# Patient Record
Sex: FEMALE | Race: BLACK OR AFRICAN AMERICAN | Employment: OTHER | URBAN - METROPOLITAN AREA
[De-identification: names, ages, dates, MRNs, and addresses within clinical notes are randomized per-mention and may not be internally consistent; named-entity substitution may affect disease eponyms.]

---

## 2018-02-16 PROBLEM — N17.9 AKI (ACUTE KIDNEY INJURY) (HCC): Status: ACTIVE | Noted: 2018-02-16

## 2018-02-28 PROBLEM — E66.01 MORBID OBESITY WITH BMI OF 50.0-59.9, ADULT (HCC): Status: ACTIVE | Noted: 2018-02-28

## 2018-02-28 PROBLEM — N18.2 STAGE 2 CHRONIC KIDNEY DISEASE: Status: ACTIVE | Noted: 2018-02-28

## 2018-02-28 PROBLEM — N18.2 TYPE 2 DIABETES MELLITUS WITH STAGE 2 CHRONIC KIDNEY DISEASE, WITHOUT LONG-TERM CURRENT USE OF INSULIN (HCC): Status: ACTIVE | Noted: 2018-02-28

## 2018-02-28 PROBLEM — E11.22 TYPE 2 DIABETES MELLITUS WITH STAGE 2 CHRONIC KIDNEY DISEASE, WITHOUT LONG-TERM CURRENT USE OF INSULIN (HCC): Status: ACTIVE | Noted: 2018-02-28

## 2018-03-13 ENCOUNTER — HOSPITAL ENCOUNTER (OUTPATIENT)
Dept: NON INVASIVE DIAGNOSTICS | Age: 58
Discharge: HOME | End: 2018-03-13
Payer: MEDICAID

## 2018-03-13 ENCOUNTER — HOSPITAL ENCOUNTER (OUTPATIENT)
Dept: NUCLEAR MEDICINE | Age: 58
Discharge: HOME | End: 2018-03-15
Payer: MEDICAID

## 2018-03-13 VITALS — WEIGHT: 293 LBS | HEIGHT: 66 IN | BODY MASS INDEX: 47.09 KG/M2

## 2018-03-13 LAB
LV EF: 65 %
LVEF MODALITY: NORMAL

## 2018-03-13 PROCEDURE — 2580000003 HC RX 258

## 2018-03-13 PROCEDURE — 3430000000 HC RX DIAGNOSTIC RADIOPHARMACEUTICAL: Performed by: RADIOLOGY

## 2018-03-13 PROCEDURE — 96523 IRRIG DRUG DELIVERY DEVICE: CPT

## 2018-03-13 PROCEDURE — 78452 HT MUSCLE IMAGE SPECT MULT: CPT

## 2018-03-13 PROCEDURE — A9500 TC99M SESTAMIBI: HCPCS | Performed by: RADIOLOGY

## 2018-03-13 PROCEDURE — 6360000002 HC RX W HCPCS: Performed by: INTERNAL MEDICINE

## 2018-03-13 PROCEDURE — 93017 CV STRESS TEST TRACING ONLY: CPT

## 2018-03-13 RX ADMIN — Medication 10 MILLICURIE: at 13:35

## 2018-03-13 RX ADMIN — Medication 35 MILLICURIE: at 14:53

## 2018-03-13 RX ADMIN — REGADENOSON 0.4 MG: 0.08 INJECTION, SOLUTION INTRAVENOUS at 14:50

## 2018-03-23 ENCOUNTER — HOSPITAL ENCOUNTER (OUTPATIENT)
Age: 58
Discharge: HOME | End: 2018-03-23
Payer: MEDICAID

## 2018-03-23 LAB
ANION GAP SERPL CALCULATED.3IONS-SCNC: 17 MMOL/L (ref 7–16)
BUN BLDV-MCNC: 36 MG/DL (ref 6–20)
CALCIUM SERPL-MCNC: 10.4 MG/DL (ref 8.6–10.2)
CHLORIDE BLD-SCNC: 93 MMOL/L (ref 98–107)
CO2: 25 MMOL/L (ref 22–29)
CREAT SERPL-MCNC: 1.4 MG/DL (ref 0.5–1)
GFR AFRICAN AMERICAN: 47
GFR NON-AFRICAN AMERICAN: 47 ML/MIN/1.73
GLUCOSE BLD-MCNC: 156 MG/DL (ref 74–109)
MAGNESIUM: 1.6 MG/DL (ref 1.6–2.6)
POTASSIUM SERPL-SCNC: 4 MMOL/L (ref 3.5–5)
SODIUM BLD-SCNC: 135 MMOL/L (ref 132–146)
URIC ACID, SERUM: 11.2 MG/DL (ref 2.4–5.7)

## 2018-03-23 PROCEDURE — 36415 COLL VENOUS BLD VENIPUNCTURE: CPT

## 2018-03-23 PROCEDURE — 83735 ASSAY OF MAGNESIUM: CPT

## 2018-03-23 PROCEDURE — 80048 BASIC METABOLIC PNL TOTAL CA: CPT

## 2018-03-23 PROCEDURE — 84550 ASSAY OF BLOOD/URIC ACID: CPT

## 2018-05-29 ENCOUNTER — HOSPITAL ENCOUNTER (OUTPATIENT)
Age: 58
Discharge: HOME | End: 2018-05-29
Payer: MEDICAID

## 2018-05-29 LAB
ALBUMIN SERPL-MCNC: 4.4 G/DL (ref 3.5–5.2)
ALP BLD-CCNC: 50 U/L (ref 35–104)
ALT SERPL-CCNC: 26 U/L (ref 0–32)
ANION GAP SERPL CALCULATED.3IONS-SCNC: 21 MMOL/L (ref 7–16)
AST SERPL-CCNC: 17 U/L (ref 0–31)
BILIRUB SERPL-MCNC: 0.4 MG/DL (ref 0–1.2)
BUN BLDV-MCNC: 40 MG/DL (ref 6–20)
CALCIUM SERPL-MCNC: 10.7 MG/DL (ref 8.6–10.2)
CHLORIDE BLD-SCNC: 93 MMOL/L (ref 98–107)
CO2: 20 MMOL/L (ref 22–29)
CREAT SERPL-MCNC: 1.3 MG/DL (ref 0.5–1)
CREATININE URINE: 161 MG/DL (ref 29–226)
GFR AFRICAN AMERICAN: 51
GFR NON-AFRICAN AMERICAN: 51 ML/MIN/1.73
GLUCOSE BLD-MCNC: 183 MG/DL (ref 74–109)
MAGNESIUM: 1.5 MG/DL (ref 1.6–2.6)
PHOSPHORUS: 2.9 MG/DL (ref 2.5–4.5)
POTASSIUM SERPL-SCNC: 3.8 MMOL/L (ref 3.5–5)
PROTEIN PROTEIN: 7 MG/DL (ref 0–12)
PROTEIN/CREAT RATIO: 0
PROTEIN/CREAT RATIO: 0 (ref 0–0.2)
SODIUM BLD-SCNC: 134 MMOL/L (ref 132–146)
TOTAL PROTEIN: 7.7 G/DL (ref 6.4–8.3)
URIC ACID, SERUM: 7.9 MG/DL (ref 2.4–5.7)

## 2018-05-29 PROCEDURE — 82570 ASSAY OF URINE CREATININE: CPT

## 2018-05-29 PROCEDURE — 84550 ASSAY OF BLOOD/URIC ACID: CPT

## 2018-05-29 PROCEDURE — 36415 COLL VENOUS BLD VENIPUNCTURE: CPT

## 2018-05-29 PROCEDURE — 85025 COMPLETE CBC W/AUTO DIFF WBC: CPT

## 2018-05-29 PROCEDURE — 84156 ASSAY OF PROTEIN URINE: CPT

## 2018-05-29 PROCEDURE — 83735 ASSAY OF MAGNESIUM: CPT

## 2018-05-29 PROCEDURE — 84100 ASSAY OF PHOSPHORUS: CPT

## 2018-05-29 PROCEDURE — 80053 COMPREHEN METABOLIC PANEL: CPT

## 2018-05-30 LAB
BASOPHILS ABSOLUTE: ABNORMAL E9/L (ref 0–0.2)
BASOPHILS RELATIVE PERCENT: ABNORMAL % (ref 0–2)
EOSINOPHILS ABSOLUTE: ABNORMAL E9/L (ref 0.05–0.5)
EOSINOPHILS RELATIVE PERCENT: ABNORMAL % (ref 0–6)
HCT VFR BLD CALC: ABNORMAL % (ref 34–48)
HEMOGLOBIN: ABNORMAL G/DL (ref 11.5–15.5)
IMMATURE GRANULOCYTES #: ABNORMAL E9/L
IMMATURE GRANULOCYTES %: ABNORMAL % (ref 0–5)
LYMPHOCYTES ABSOLUTE: ABNORMAL E9/L (ref 1.5–4)
LYMPHOCYTES RELATIVE PERCENT: ABNORMAL % (ref 20–42)
MCH RBC QN AUTO: ABNORMAL PG (ref 26–35)
MCHC RBC AUTO-ENTMCNC: ABNORMAL % (ref 32–34.5)
MCV RBC AUTO: ABNORMAL FL (ref 80–99.9)
MONOCYTES ABSOLUTE: ABNORMAL E9/L (ref 0.1–0.95)
MONOCYTES RELATIVE PERCENT: ABNORMAL % (ref 2–12)
NEUTROPHILS ABSOLUTE: ABNORMAL E9/L (ref 1.8–7.3)
NEUTROPHILS RELATIVE PERCENT: ABNORMAL % (ref 43–80)
PDW BLD-RTO: ABNORMAL FL (ref 11.5–15)
PLATELET # BLD: ABNORMAL E9/L (ref 130–450)
PMV BLD AUTO: ABNORMAL FL (ref 7–12)
RBC # BLD: ABNORMAL E12/L (ref 3.5–5.5)
WBC # BLD: ABNORMAL E9/L (ref 4.5–11.5)

## 2018-06-06 ENCOUNTER — HOSPITAL ENCOUNTER (OUTPATIENT)
Age: 58
Discharge: HOME | End: 2018-06-06
Payer: MEDICAID

## 2018-06-06 LAB
BASOPHILS ABSOLUTE: 0.07 E9/L (ref 0–0.2)
BASOPHILS RELATIVE PERCENT: 0.9 % (ref 0–2)
EOSINOPHILS ABSOLUTE: 0.19 E9/L (ref 0.05–0.5)
EOSINOPHILS RELATIVE PERCENT: 2.3 % (ref 0–6)
HCT VFR BLD CALC: 34.9 % (ref 34–48)
HEMOGLOBIN: 11.6 G/DL (ref 11.5–15.5)
IMMATURE GRANULOCYTES #: 0.05 E9/L
IMMATURE GRANULOCYTES %: 0.6 % (ref 0–5)
LYMPHOCYTES ABSOLUTE: 2.26 E9/L (ref 1.5–4)
LYMPHOCYTES RELATIVE PERCENT: 27.7 % (ref 20–42)
MCH RBC QN AUTO: 29.6 PG (ref 26–35)
MCHC RBC AUTO-ENTMCNC: 33.2 % (ref 32–34.5)
MCV RBC AUTO: 89 FL (ref 80–99.9)
MONOCYTES ABSOLUTE: 0.45 E9/L (ref 0.1–0.95)
MONOCYTES RELATIVE PERCENT: 5.5 % (ref 2–12)
NEUTROPHILS ABSOLUTE: 5.13 E9/L (ref 1.8–7.3)
NEUTROPHILS RELATIVE PERCENT: 63 % (ref 43–80)
PDW BLD-RTO: 12.3 FL (ref 11.5–15)
PLATELET # BLD: 366 E9/L (ref 130–450)
PMV BLD AUTO: 9.3 FL (ref 7–12)
RBC # BLD: 3.92 E12/L (ref 3.5–5.5)
WBC # BLD: 8.2 E9/L (ref 4.5–11.5)

## 2018-06-06 PROCEDURE — 36415 COLL VENOUS BLD VENIPUNCTURE: CPT

## 2018-06-06 PROCEDURE — 85025 COMPLETE CBC W/AUTO DIFF WBC: CPT

## 2018-10-24 ENCOUNTER — HOSPITAL ENCOUNTER (OUTPATIENT)
Age: 58
Discharge: HOME | End: 2018-10-24
Payer: MEDICAID

## 2018-10-24 DIAGNOSIS — N18.2 STAGE 2 CHRONIC KIDNEY DISEASE: ICD-10-CM

## 2018-10-24 DIAGNOSIS — N18.2 TYPE 2 DIABETES MELLITUS WITH STAGE 2 CHRONIC KIDNEY DISEASE, WITHOUT LONG-TERM CURRENT USE OF INSULIN (HCC): ICD-10-CM

## 2018-10-24 DIAGNOSIS — N17.9 AKI (ACUTE KIDNEY INJURY) (HCC): ICD-10-CM

## 2018-10-24 DIAGNOSIS — E11.22 TYPE 2 DIABETES MELLITUS WITH STAGE 2 CHRONIC KIDNEY DISEASE, WITHOUT LONG-TERM CURRENT USE OF INSULIN (HCC): ICD-10-CM

## 2018-10-24 LAB
ANION GAP SERPL CALCULATED.3IONS-SCNC: 17 MMOL/L (ref 7–16)
BUN BLDV-MCNC: 21 MG/DL (ref 6–20)
CALCIUM SERPL-MCNC: 9.8 MG/DL (ref 8.6–10.2)
CHLORIDE BLD-SCNC: 102 MMOL/L (ref 98–107)
CHOLESTEROL, TOTAL: 177 MG/DL (ref 0–199)
CO2: 18 MMOL/L (ref 22–29)
CREAT SERPL-MCNC: 1.2 MG/DL (ref 0.5–1)
GFR AFRICAN AMERICAN: 56
GFR NON-AFRICAN AMERICAN: 56 ML/MIN/1.73
GLUCOSE BLD-MCNC: 161 MG/DL (ref 74–109)
HBA1C MFR BLD: 9.7 % (ref 4–5.6)
HCT VFR BLD CALC: 38.3 % (ref 34–48)
HDLC SERPL-MCNC: 47 MG/DL
HEMOGLOBIN: 12.3 G/DL (ref 11.5–15.5)
LDL CHOLESTEROL CALCULATED: 101 MG/DL (ref 0–99)
MCH RBC QN AUTO: 28.7 PG (ref 26–35)
MCHC RBC AUTO-ENTMCNC: 32.1 % (ref 32–34.5)
MCV RBC AUTO: 89.3 FL (ref 80–99.9)
PDW BLD-RTO: 12.3 FL (ref 11.5–15)
PLATELET # BLD: 369 E9/L (ref 130–450)
PMV BLD AUTO: 9.8 FL (ref 7–12)
POTASSIUM SERPL-SCNC: 4.7 MMOL/L (ref 3.5–5)
RBC # BLD: 4.29 E12/L (ref 3.5–5.5)
SODIUM BLD-SCNC: 137 MMOL/L (ref 132–146)
TRIGL SERPL-MCNC: 147 MG/DL (ref 0–149)
VLDLC SERPL CALC-MCNC: 29 MG/DL
WBC # BLD: 7.5 E9/L (ref 4.5–11.5)

## 2018-10-24 PROCEDURE — 80048 BASIC METABOLIC PNL TOTAL CA: CPT

## 2018-10-24 PROCEDURE — 80061 LIPID PANEL: CPT

## 2018-10-24 PROCEDURE — 85027 COMPLETE CBC AUTOMATED: CPT

## 2018-10-24 PROCEDURE — 36415 COLL VENOUS BLD VENIPUNCTURE: CPT

## 2018-10-24 PROCEDURE — 83036 HEMOGLOBIN GLYCOSYLATED A1C: CPT

## 2018-11-06 ENCOUNTER — APPOINTMENT (OUTPATIENT)
Dept: CT IMAGING | Age: 58
End: 2018-11-06
Payer: MEDICAID

## 2018-11-06 ENCOUNTER — HOSPITAL ENCOUNTER (EMERGENCY)
Age: 58
Discharge: HOME | End: 2018-11-06
Attending: EMERGENCY MEDICINE
Payer: MEDICAID

## 2018-11-06 VITALS
HEART RATE: 76 BPM | WEIGHT: 293 LBS | OXYGEN SATURATION: 98 % | BODY MASS INDEX: 48.82 KG/M2 | DIASTOLIC BLOOD PRESSURE: 78 MMHG | RESPIRATION RATE: 16 BRPM | TEMPERATURE: 97.8 F | HEIGHT: 65 IN | SYSTOLIC BLOOD PRESSURE: 149 MMHG

## 2018-11-06 DIAGNOSIS — K29.00 ACUTE SUPERFICIAL GASTRITIS WITHOUT HEMORRHAGE: Primary | ICD-10-CM

## 2018-11-06 LAB
ALBUMIN SERPL-MCNC: 4.7 G/DL (ref 3.5–5.2)
ALP BLD-CCNC: 60 U/L (ref 35–104)
ALT SERPL-CCNC: 29 U/L (ref 0–32)
ANION GAP SERPL CALCULATED.3IONS-SCNC: 12 MMOL/L (ref 7–16)
AST SERPL-CCNC: 26 U/L (ref 0–31)
BASOPHILS ABSOLUTE: 0.07 E9/L (ref 0–0.2)
BASOPHILS RELATIVE PERCENT: 0.7 % (ref 0–2)
BILIRUB SERPL-MCNC: 0.5 MG/DL (ref 0–1.2)
BUN BLDV-MCNC: 19 MG/DL (ref 6–20)
CALCIUM SERPL-MCNC: 10.7 MG/DL (ref 8.6–10.2)
CHLORIDE BLD-SCNC: 99 MMOL/L (ref 98–107)
CO2: 25 MMOL/L (ref 22–29)
CREAT SERPL-MCNC: 1 MG/DL (ref 0.5–1)
EKG ATRIAL RATE: 63 BPM
EKG P AXIS: 29 DEGREES
EKG P-R INTERVAL: 164 MS
EKG Q-T INTERVAL: 388 MS
EKG QRS DURATION: 94 MS
EKG QTC CALCULATION (BAZETT): 397 MS
EKG R AXIS: -22 DEGREES
EKG T AXIS: 14 DEGREES
EKG VENTRICULAR RATE: 63 BPM
EOSINOPHILS ABSOLUTE: 0.14 E9/L (ref 0.05–0.5)
EOSINOPHILS RELATIVE PERCENT: 1.5 % (ref 0–6)
GFR AFRICAN AMERICAN: >60
GFR NON-AFRICAN AMERICAN: >60 ML/MIN/1.73
GLUCOSE BLD-MCNC: 161 MG/DL (ref 74–99)
HCT VFR BLD CALC: 42.6 % (ref 34–48)
HEMOGLOBIN: 13.9 G/DL (ref 11.5–15.5)
IMMATURE GRANULOCYTES #: 0.06 E9/L
IMMATURE GRANULOCYTES %: 0.6 % (ref 0–5)
LACTIC ACID: 1.7 MMOL/L (ref 0.5–2.2)
LIPASE: 23 U/L (ref 13–60)
LYMPHOCYTES ABSOLUTE: 2.48 E9/L (ref 1.5–4)
LYMPHOCYTES RELATIVE PERCENT: 26.6 % (ref 20–42)
MCH RBC QN AUTO: 29.2 PG (ref 26–35)
MCHC RBC AUTO-ENTMCNC: 32.6 % (ref 32–34.5)
MCV RBC AUTO: 89.5 FL (ref 80–99.9)
MONOCYTES ABSOLUTE: 0.71 E9/L (ref 0.1–0.95)
MONOCYTES RELATIVE PERCENT: 7.6 % (ref 2–12)
NEUTROPHILS ABSOLUTE: 5.88 E9/L (ref 1.8–7.3)
NEUTROPHILS RELATIVE PERCENT: 63 % (ref 43–80)
PDW BLD-RTO: 12.5 FL (ref 11.5–15)
PLATELET # BLD: 375 E9/L (ref 130–450)
PMV BLD AUTO: 9.8 FL (ref 7–12)
POTASSIUM SERPL-SCNC: 5.4 MMOL/L (ref 3.5–5)
PRO-BNP: 144 PG/ML (ref 0–125)
RBC # BLD: 4.76 E12/L (ref 3.5–5.5)
SODIUM BLD-SCNC: 136 MMOL/L (ref 132–146)
TOTAL PROTEIN: 8.2 G/DL (ref 6.4–8.3)
TROPONIN: <0.01 NG/ML (ref 0–0.03)
WBC # BLD: 9.3 E9/L (ref 4.5–11.5)

## 2018-11-06 PROCEDURE — 83880 ASSAY OF NATRIURETIC PEPTIDE: CPT

## 2018-11-06 PROCEDURE — 84484 ASSAY OF TROPONIN QUANT: CPT

## 2018-11-06 PROCEDURE — 2500000003 HC RX 250 WO HCPCS: Performed by: EMERGENCY MEDICINE

## 2018-11-06 PROCEDURE — 96375 TX/PRO/DX INJ NEW DRUG ADDON: CPT

## 2018-11-06 PROCEDURE — 83690 ASSAY OF LIPASE: CPT

## 2018-11-06 PROCEDURE — 2580000003 HC RX 258: Performed by: EMERGENCY MEDICINE

## 2018-11-06 PROCEDURE — 99284 EMERGENCY DEPT VISIT MOD MDM: CPT

## 2018-11-06 PROCEDURE — 83605 ASSAY OF LACTIC ACID: CPT

## 2018-11-06 PROCEDURE — 96374 THER/PROPH/DIAG INJ IV PUSH: CPT

## 2018-11-06 PROCEDURE — 74176 CT ABD & PELVIS W/O CONTRAST: CPT

## 2018-11-06 PROCEDURE — 6370000000 HC RX 637 (ALT 250 FOR IP): Performed by: EMERGENCY MEDICINE

## 2018-11-06 PROCEDURE — 6360000002 HC RX W HCPCS: Performed by: EMERGENCY MEDICINE

## 2018-11-06 PROCEDURE — S0028 INJECTION, FAMOTIDINE, 20 MG: HCPCS | Performed by: EMERGENCY MEDICINE

## 2018-11-06 PROCEDURE — 85025 COMPLETE CBC W/AUTO DIFF WBC: CPT

## 2018-11-06 PROCEDURE — 80053 COMPREHEN METABOLIC PANEL: CPT

## 2018-11-06 PROCEDURE — 36415 COLL VENOUS BLD VENIPUNCTURE: CPT

## 2018-11-06 RX ORDER — ACETAMINOPHEN 500 MG
1000 TABLET ORAL ONCE
Status: COMPLETED | OUTPATIENT
Start: 2018-11-06 | End: 2018-11-06

## 2018-11-06 RX ORDER — ONDANSETRON 2 MG/ML
4 INJECTION INTRAMUSCULAR; INTRAVENOUS ONCE
Status: COMPLETED | OUTPATIENT
Start: 2018-11-06 | End: 2018-11-06

## 2018-11-06 RX ORDER — 0.9 % SODIUM CHLORIDE 0.9 %
1000 INTRAVENOUS SOLUTION INTRAVENOUS ONCE
Status: COMPLETED | OUTPATIENT
Start: 2018-11-06 | End: 2018-11-06

## 2018-11-06 RX ORDER — MORPHINE SULFATE 2 MG/ML
8 INJECTION, SOLUTION INTRAMUSCULAR; INTRAVENOUS ONCE
Status: COMPLETED | OUTPATIENT
Start: 2018-11-06 | End: 2018-11-06

## 2018-11-06 RX ORDER — FAMOTIDINE 20 MG/1
20 TABLET, FILM COATED ORAL 2 TIMES DAILY
Qty: 14 TABLET | Refills: 0 | Status: SHIPPED | OUTPATIENT
Start: 2018-11-06 | End: 2019-09-18

## 2018-11-06 RX ORDER — SODIUM CHLORIDE 0.9 % (FLUSH) 0.9 %
10 SYRINGE (ML) INJECTION PRN
Status: DISCONTINUED | OUTPATIENT
Start: 2018-11-06 | End: 2018-11-07 | Stop reason: HOSPADM

## 2018-11-06 RX ORDER — SUCRALFATE 1 G/1
1 TABLET ORAL 4 TIMES DAILY
Qty: 120 TABLET | Refills: 3 | Status: SHIPPED | OUTPATIENT
Start: 2018-11-06 | End: 2019-09-18

## 2018-11-06 RX ADMIN — ACETAMINOPHEN 1000 MG: 500 TABLET ORAL at 20:32

## 2018-11-06 RX ADMIN — MORPHINE SULFATE 8 MG: 2 INJECTION, SOLUTION INTRAMUSCULAR; INTRAVENOUS at 20:32

## 2018-11-06 RX ADMIN — LIDOCAINE HYDROCHLORIDE: 20 SOLUTION ORAL; TOPICAL at 21:30

## 2018-11-06 RX ADMIN — FAMOTIDINE 20 MG: 10 INJECTION, SOLUTION INTRAVENOUS at 21:30

## 2018-11-06 RX ADMIN — ONDANSETRON 4 MG: 2 INJECTION INTRAMUSCULAR; INTRAVENOUS at 20:32

## 2018-11-06 RX ADMIN — SODIUM CHLORIDE 1000 ML: 9 INJECTION, SOLUTION INTRAVENOUS at 20:32

## 2018-11-06 RX ADMIN — Medication 10 ML: at 21:34

## 2018-11-06 ASSESSMENT — PAIN SCALES - GENERAL
PAINLEVEL_OUTOF10: 10
PAINLEVEL_OUTOF10: 2
PAINLEVEL_OUTOF10: 10

## 2018-11-06 ASSESSMENT — ENCOUNTER SYMPTOMS
DIARRHEA: 0
BLOOD IN STOOL: 0
BACK PAIN: 0
HEMATOCHEZIA: 0
NAUSEA: 1
COUGH: 0
VOMITING: 0
CONSTIPATION: 0
HEMATEMESIS: 0
SHORTNESS OF BREATH: 0
ABDOMINAL PAIN: 1

## 2018-11-06 ASSESSMENT — PAIN DESCRIPTION - LOCATION: LOCATION: ABDOMEN

## 2018-11-06 ASSESSMENT — PAIN DESCRIPTION - PAIN TYPE: TYPE: ACUTE PAIN

## 2018-11-07 NOTE — ED PROVIDER NOTES
% 1.5 0.0 - 6.0 %    Basophils % 0.7 0.0 - 2.0 %    Neutrophils # 5.88 1.80 - 7.30 E9/L    Immature Granulocytes # 0.06 E9/L    Lymphocytes # 2.48 1.50 - 4.00 E9/L    Monocytes # 0.71 0.10 - 0.95 E9/L    Eosinophils # 0.14 0.05 - 0.50 E9/L    Basophils # 0.07 0.00 - 0.20 E9/L   Comprehensive Metabolic Panel   Result Value Ref Range    Sodium 136 132 - 146 mmol/L    Potassium 5.4 (H) 3.5 - 5.0 mmol/L    Chloride 99 98 - 107 mmol/L    CO2 25 22 - 29 mmol/L    Anion Gap 12 7 - 16 mmol/L    Glucose 161 (H) 74 - 99 mg/dL    BUN 19 6 - 20 mg/dL    CREATININE 1.0 0.5 - 1.0 mg/dL    GFR Non-African American >60 >=60 mL/min/1.73    GFR African American >60     Calcium 10.7 (H) 8.6 - 10.2 mg/dL    Total Protein 8.2 6.4 - 8.3 g/dL    Alb 4.7 3.5 - 5.2 g/dL    Total Bilirubin 0.5 0.0 - 1.2 mg/dL    Alkaline Phosphatase 60 35 - 104 U/L    ALT 29 0 - 32 U/L    AST 26 0 - 31 U/L   Lipase   Result Value Ref Range    Lipase 23 13 - 60 U/L   Lactic Acid, Plasma   Result Value Ref Range    Lactic Acid 1.7 0.5 - 2.2 mmol/L   Brain Natriuretic Peptide   Result Value Ref Range    Pro- (H) 0 - 125 pg/mL   Troponin   Result Value Ref Range    Troponin <0.01 0.00 - 0.03 ng/mL   EKG 12 Lead   Result Value Ref Range    Ventricular Rate 63 BPM    Atrial Rate 63 BPM    P-R Interval 164 ms    QRS Duration 94 ms    Q-T Interval 388 ms    QTc Calculation (Bazett) 397 ms    P Axis 29 degrees    R Axis -22 degrees    T Axis 14 degrees       Radiology:  CT ABDOMEN PELVIS WO CONTRAST Additional Contrast? None   Final Result   1. Status post hysterectomy, ovaries not identified. 2. Mild hepatomegaly with diffuse fat infiltration. 3. Partially distended gallbladder but there are food contents in the   stomach. 4. No dilatation of the biliary tree or BRCA2 pancreatic ductal   system. 5. No obstructive uropathy. 6. Appendix seen and appears unremarkable.       7. No acute inflammatory changes omental mesenteric fat planes,

## 2018-11-14 ENCOUNTER — HOSPITAL ENCOUNTER (OUTPATIENT)
Age: 58
Discharge: HOME | End: 2018-11-14
Payer: MEDICAID

## 2018-11-14 LAB
ALBUMIN SERPL-MCNC: 4.5 G/DL (ref 3.5–5.2)
ALP BLD-CCNC: 61 U/L (ref 35–104)
ALT SERPL-CCNC: 25 U/L (ref 0–32)
ANION GAP SERPL CALCULATED.3IONS-SCNC: 12 MMOL/L (ref 7–16)
AST SERPL-CCNC: 17 U/L (ref 0–31)
BASOPHILS ABSOLUTE: 0.06 E9/L (ref 0–0.2)
BASOPHILS RELATIVE PERCENT: 0.6 % (ref 0–2)
BILIRUB SERPL-MCNC: 0.5 MG/DL (ref 0–1.2)
BUN BLDV-MCNC: 22 MG/DL (ref 6–20)
CALCIUM SERPL-MCNC: 10.8 MG/DL (ref 8.6–10.2)
CHLORIDE BLD-SCNC: 100 MMOL/L (ref 98–107)
CO2: 24 MMOL/L (ref 22–29)
CREAT SERPL-MCNC: 1.1 MG/DL (ref 0.5–1)
CREATININE URINE: 89 MG/DL (ref 29–226)
EOSINOPHILS ABSOLUTE: 0.13 E9/L (ref 0.05–0.5)
EOSINOPHILS RELATIVE PERCENT: 1.3 % (ref 0–6)
GFR AFRICAN AMERICAN: >60
GFR NON-AFRICAN AMERICAN: >60 ML/MIN/1.73
GLUCOSE BLD-MCNC: 162 MG/DL (ref 74–99)
HCT VFR BLD CALC: 39.2 % (ref 34–48)
HEMOGLOBIN: 12.6 G/DL (ref 11.5–15.5)
IMMATURE GRANULOCYTES #: 0.06 E9/L
IMMATURE GRANULOCYTES %: 0.6 % (ref 0–5)
LYMPHOCYTES ABSOLUTE: 2.41 E9/L (ref 1.5–4)
LYMPHOCYTES RELATIVE PERCENT: 23.7 % (ref 20–42)
MAGNESIUM: 1.7 MG/DL (ref 1.6–2.6)
MCH RBC QN AUTO: 28.5 PG (ref 26–35)
MCHC RBC AUTO-ENTMCNC: 32.1 % (ref 32–34.5)
MCV RBC AUTO: 88.7 FL (ref 80–99.9)
MONOCYTES ABSOLUTE: 0.69 E9/L (ref 0.1–0.95)
MONOCYTES RELATIVE PERCENT: 6.8 % (ref 2–12)
NEUTROPHILS ABSOLUTE: 6.8 E9/L (ref 1.8–7.3)
NEUTROPHILS RELATIVE PERCENT: 67 % (ref 43–80)
PDW BLD-RTO: 12.3 FL (ref 11.5–15)
PHOSPHORUS: 2.8 MG/DL (ref 2.5–4.5)
PLATELET # BLD: 410 E9/L (ref 130–450)
PMV BLD AUTO: 9.5 FL (ref 7–12)
POTASSIUM SERPL-SCNC: 4.4 MMOL/L (ref 3.5–5)
PROTEIN PROTEIN: 6 MG/DL (ref 0–12)
PROTEIN/CREAT RATIO: 0.1
PROTEIN/CREAT RATIO: 0.1 (ref 0–0.2)
RBC # BLD: 4.42 E12/L (ref 3.5–5.5)
SODIUM BLD-SCNC: 136 MMOL/L (ref 132–146)
TOTAL PROTEIN: 8 G/DL (ref 6.4–8.3)
URIC ACID, SERUM: 5.6 MG/DL (ref 2.4–5.7)
WBC # BLD: 10.2 E9/L (ref 4.5–11.5)

## 2018-11-14 PROCEDURE — 84100 ASSAY OF PHOSPHORUS: CPT

## 2018-11-14 PROCEDURE — 36415 COLL VENOUS BLD VENIPUNCTURE: CPT

## 2018-11-14 PROCEDURE — 84550 ASSAY OF BLOOD/URIC ACID: CPT

## 2018-11-14 PROCEDURE — 84156 ASSAY OF PROTEIN URINE: CPT

## 2018-11-14 PROCEDURE — 82570 ASSAY OF URINE CREATININE: CPT

## 2018-11-14 PROCEDURE — 83735 ASSAY OF MAGNESIUM: CPT

## 2018-11-14 PROCEDURE — 80053 COMPREHEN METABOLIC PANEL: CPT

## 2018-11-14 PROCEDURE — 85025 COMPLETE CBC W/AUTO DIFF WBC: CPT

## 2019-05-10 ENCOUNTER — HOSPITAL ENCOUNTER (OUTPATIENT)
Age: 59
Discharge: HOME | End: 2019-05-10
Payer: MEDICAID

## 2019-05-10 LAB
ALBUMIN SERPL-MCNC: 4 G/DL (ref 3.5–5.2)
ALP BLD-CCNC: 73 U/L (ref 35–104)
ALT SERPL-CCNC: 35 U/L (ref 0–32)
ANION GAP SERPL CALCULATED.3IONS-SCNC: 14 MMOL/L (ref 7–16)
AST SERPL-CCNC: 26 U/L (ref 0–31)
BASOPHILS ABSOLUTE: 0.07 E9/L (ref 0–0.2)
BASOPHILS RELATIVE PERCENT: 1.1 % (ref 0–2)
BILIRUB SERPL-MCNC: 0.5 MG/DL (ref 0–1.2)
BUN BLDV-MCNC: 24 MG/DL (ref 6–20)
CALCIUM SERPL-MCNC: 10.3 MG/DL (ref 8.6–10.2)
CHLORIDE BLD-SCNC: 97 MMOL/L (ref 98–107)
CO2: 20 MMOL/L (ref 22–29)
CREAT SERPL-MCNC: 1.1 MG/DL (ref 0.5–1)
CREATININE URINE: 63 MG/DL (ref 29–226)
EOSINOPHILS ABSOLUTE: 0.15 E9/L (ref 0.05–0.5)
EOSINOPHILS RELATIVE PERCENT: 2.3 % (ref 0–6)
GFR AFRICAN AMERICAN: >60
GFR NON-AFRICAN AMERICAN: >60 ML/MIN/1.73
GLUCOSE BLD-MCNC: 368 MG/DL (ref 74–99)
HCT VFR BLD CALC: 38.2 % (ref 34–48)
HEMOGLOBIN: 12.6 G/DL (ref 11.5–15.5)
IMMATURE GRANULOCYTES #: 0.05 E9/L
IMMATURE GRANULOCYTES %: 0.8 % (ref 0–5)
LYMPHOCYTES ABSOLUTE: 1.98 E9/L (ref 1.5–4)
LYMPHOCYTES RELATIVE PERCENT: 30.7 % (ref 20–42)
MAGNESIUM: 1.9 MG/DL (ref 1.6–2.6)
MCH RBC QN AUTO: 28.8 PG (ref 26–35)
MCHC RBC AUTO-ENTMCNC: 33 % (ref 32–34.5)
MCV RBC AUTO: 87.4 FL (ref 80–99.9)
MONOCYTES ABSOLUTE: 0.48 E9/L (ref 0.1–0.95)
MONOCYTES RELATIVE PERCENT: 7.5 % (ref 2–12)
NEUTROPHILS ABSOLUTE: 3.71 E9/L (ref 1.8–7.3)
NEUTROPHILS RELATIVE PERCENT: 57.6 % (ref 43–80)
PDW BLD-RTO: 12.1 FL (ref 11.5–15)
PHOSPHORUS: 2.9 MG/DL (ref 2.5–4.5)
PLATELET # BLD: 355 E9/L (ref 130–450)
PMV BLD AUTO: 9.6 FL (ref 7–12)
POTASSIUM SERPL-SCNC: 4.6 MMOL/L (ref 3.5–5)
PROTEIN PROTEIN: 6 MG/DL (ref 0–12)
PROTEIN/CREAT RATIO: 0.1
PROTEIN/CREAT RATIO: 0.1 (ref 0–0.2)
RBC # BLD: 4.37 E12/L (ref 3.5–5.5)
SODIUM BLD-SCNC: 131 MMOL/L (ref 132–146)
TOTAL PROTEIN: 7.5 G/DL (ref 6.4–8.3)
URIC ACID, SERUM: 5.6 MG/DL (ref 2.4–5.7)
WBC # BLD: 6.4 E9/L (ref 4.5–11.5)

## 2019-05-10 PROCEDURE — 84550 ASSAY OF BLOOD/URIC ACID: CPT

## 2019-05-10 PROCEDURE — 85025 COMPLETE CBC W/AUTO DIFF WBC: CPT

## 2019-05-10 PROCEDURE — 82570 ASSAY OF URINE CREATININE: CPT

## 2019-05-10 PROCEDURE — 84100 ASSAY OF PHOSPHORUS: CPT

## 2019-05-10 PROCEDURE — 80053 COMPREHEN METABOLIC PANEL: CPT

## 2019-05-10 PROCEDURE — 36415 COLL VENOUS BLD VENIPUNCTURE: CPT

## 2019-05-10 PROCEDURE — 83735 ASSAY OF MAGNESIUM: CPT

## 2019-05-10 PROCEDURE — 84156 ASSAY OF PROTEIN URINE: CPT

## 2019-10-03 ENCOUNTER — HOSPITAL ENCOUNTER (OUTPATIENT)
Dept: GENERAL RADIOLOGY | Age: 59
Discharge: HOME | End: 2019-10-05
Payer: MEDICAID

## 2019-10-03 DIAGNOSIS — Z12.39 BREAST CANCER SCREENING: ICD-10-CM

## 2019-10-03 PROCEDURE — 77063 BREAST TOMOSYNTHESIS BI: CPT

## 2019-11-04 ENCOUNTER — HOSPITAL ENCOUNTER (OUTPATIENT)
Age: 59
Discharge: HOME | End: 2019-11-04
Payer: MEDICAID

## 2019-11-04 LAB
ALBUMIN SERPL-MCNC: 4.7 G/DL (ref 3.5–5.2)
ALP BLD-CCNC: 77 U/L (ref 35–104)
ALT SERPL-CCNC: 36 U/L (ref 0–32)
ANION GAP SERPL CALCULATED.3IONS-SCNC: 19 MMOL/L (ref 7–16)
AST SERPL-CCNC: 23 U/L (ref 0–31)
BASOPHILS ABSOLUTE: 0.06 E9/L (ref 0–0.2)
BASOPHILS RELATIVE PERCENT: 0.7 % (ref 0–2)
BILIRUB SERPL-MCNC: 0.5 MG/DL (ref 0–1.2)
BILIRUBIN URINE: NEGATIVE
BLOOD, URINE: NEGATIVE
BUN BLDV-MCNC: 18 MG/DL (ref 6–20)
CALCIUM SERPL-MCNC: 11.1 MG/DL (ref 8.6–10.2)
CHLORIDE BLD-SCNC: 98 MMOL/L (ref 98–107)
CLARITY: CLEAR
CO2: 19 MMOL/L (ref 22–29)
COLOR: YELLOW
CREAT SERPL-MCNC: 1 MG/DL (ref 0.5–1)
CREATININE URINE: 97 MG/DL (ref 29–226)
EOSINOPHILS ABSOLUTE: 0.14 E9/L (ref 0.05–0.5)
EOSINOPHILS RELATIVE PERCENT: 1.7 % (ref 0–6)
GFR AFRICAN AMERICAN: >60
GFR NON-AFRICAN AMERICAN: >60 ML/MIN/1.73
GLUCOSE BLD-MCNC: 215 MG/DL (ref 74–99)
GLUCOSE URINE: 250 MG/DL
HCT VFR BLD CALC: 41.5 % (ref 34–48)
HEMOGLOBIN: 13.6 G/DL (ref 11.5–15.5)
IMMATURE GRANULOCYTES #: 0.05 E9/L
IMMATURE GRANULOCYTES %: 0.6 % (ref 0–5)
KETONES, URINE: NEGATIVE MG/DL
LEUKOCYTE ESTERASE, URINE: NEGATIVE
LYMPHOCYTES ABSOLUTE: 2.19 E9/L (ref 1.5–4)
LYMPHOCYTES RELATIVE PERCENT: 27.2 % (ref 20–42)
MCH RBC QN AUTO: 29.1 PG (ref 26–35)
MCHC RBC AUTO-ENTMCNC: 32.8 % (ref 32–34.5)
MCV RBC AUTO: 88.9 FL (ref 80–99.9)
MONOCYTES ABSOLUTE: 0.44 E9/L (ref 0.1–0.95)
MONOCYTES RELATIVE PERCENT: 5.5 % (ref 2–12)
NEUTROPHILS ABSOLUTE: 5.16 E9/L (ref 1.8–7.3)
NEUTROPHILS RELATIVE PERCENT: 64.3 % (ref 43–80)
NITRITE, URINE: NEGATIVE
PARATHYROID HORMONE INTACT: 69 PG/ML (ref 15–65)
PDW BLD-RTO: 12.2 FL (ref 11.5–15)
PH UA: 5.5 (ref 5–9)
PHOSPHORUS: 2.8 MG/DL (ref 2.5–4.5)
PLATELET # BLD: 395 E9/L (ref 130–450)
PMV BLD AUTO: 9 FL (ref 7–12)
POTASSIUM SERPL-SCNC: 4.5 MMOL/L (ref 3.5–5)
PROTEIN PROTEIN: 10 MG/DL (ref 0–12)
PROTEIN UA: NEGATIVE MG/DL
PROTEIN/CREAT RATIO: 0.1
PROTEIN/CREAT RATIO: 0.1 (ref 0–0.2)
RBC # BLD: 4.67 E12/L (ref 3.5–5.5)
SODIUM BLD-SCNC: 136 MMOL/L (ref 132–146)
SPECIFIC GRAVITY UA: 1.02 (ref 1–1.03)
TOTAL PROTEIN: 9.1 G/DL (ref 6.4–8.3)
UROBILINOGEN, URINE: 0.2 E.U./DL
VITAMIN D 25-HYDROXY: 16 NG/ML (ref 30–100)
WBC # BLD: 8 E9/L (ref 4.5–11.5)

## 2019-11-04 PROCEDURE — 36415 COLL VENOUS BLD VENIPUNCTURE: CPT

## 2019-11-04 PROCEDURE — 84156 ASSAY OF PROTEIN URINE: CPT

## 2019-11-04 PROCEDURE — 83970 ASSAY OF PARATHORMONE: CPT

## 2019-11-04 PROCEDURE — 82570 ASSAY OF URINE CREATININE: CPT

## 2019-11-04 PROCEDURE — 80053 COMPREHEN METABOLIC PANEL: CPT

## 2019-11-04 PROCEDURE — 83735 ASSAY OF MAGNESIUM: CPT

## 2019-11-04 PROCEDURE — 85025 COMPLETE CBC W/AUTO DIFF WBC: CPT

## 2019-11-04 PROCEDURE — 84100 ASSAY OF PHOSPHORUS: CPT

## 2019-11-04 PROCEDURE — 82306 VITAMIN D 25 HYDROXY: CPT

## 2019-11-04 PROCEDURE — 81003 URINALYSIS AUTO W/O SCOPE: CPT

## 2019-11-05 LAB — MAGNESIUM: 1.9 MG/DL (ref 1.6–2.6)

## 2019-11-16 ENCOUNTER — HOSPITAL ENCOUNTER (OUTPATIENT)
Age: 59
Discharge: HOME | End: 2019-11-16
Payer: MEDICAID

## 2019-11-16 DIAGNOSIS — N18.2 TYPE 2 DIABETES MELLITUS WITH STAGE 2 CHRONIC KIDNEY DISEASE, WITHOUT LONG-TERM CURRENT USE OF INSULIN (HCC): ICD-10-CM

## 2019-11-16 DIAGNOSIS — E11.22 TYPE 2 DIABETES MELLITUS WITH STAGE 2 CHRONIC KIDNEY DISEASE, WITHOUT LONG-TERM CURRENT USE OF INSULIN (HCC): ICD-10-CM

## 2019-11-16 LAB
ANION GAP SERPL CALCULATED.3IONS-SCNC: 15 MMOL/L (ref 7–16)
BUN BLDV-MCNC: 15 MG/DL (ref 6–20)
CALCIUM SERPL-MCNC: 10.3 MG/DL (ref 8.6–10.2)
CHLORIDE BLD-SCNC: 98 MMOL/L (ref 98–107)
CHOLESTEROL, TOTAL: 181 MG/DL (ref 0–199)
CO2: 19 MMOL/L (ref 22–29)
CREAT SERPL-MCNC: 1.2 MG/DL (ref 0.5–1)
CREATININE URINE: 187 MG/DL (ref 29–226)
GFR AFRICAN AMERICAN: 56
GFR NON-AFRICAN AMERICAN: 56 ML/MIN/1.73
GLUCOSE BLD-MCNC: 266 MG/DL (ref 74–99)
HBA1C MFR BLD: 10.2 % (ref 4–5.6)
HCT VFR BLD CALC: 38.2 % (ref 34–48)
HDLC SERPL-MCNC: 50 MG/DL
HEMOGLOBIN: 12.8 G/DL (ref 11.5–15.5)
LDL CHOLESTEROL CALCULATED: 92 MG/DL (ref 0–99)
MCH RBC QN AUTO: 29.4 PG (ref 26–35)
MCHC RBC AUTO-ENTMCNC: 33.5 % (ref 32–34.5)
MCV RBC AUTO: 87.8 FL (ref 80–99.9)
MICROALBUMIN UR-MCNC: 73.9 MG/L
MICROALBUMIN/CREAT UR-RTO: 39.5 (ref 0–30)
PDW BLD-RTO: 11.9 FL (ref 11.5–15)
PLATELET # BLD: 378 E9/L (ref 130–450)
PMV BLD AUTO: 9.1 FL (ref 7–12)
POTASSIUM SERPL-SCNC: 4.4 MMOL/L (ref 3.5–5)
RBC # BLD: 4.35 E12/L (ref 3.5–5.5)
SODIUM BLD-SCNC: 132 MMOL/L (ref 132–146)
TRIGL SERPL-MCNC: 193 MG/DL (ref 0–149)
VLDLC SERPL CALC-MCNC: 39 MG/DL
WBC # BLD: 7.5 E9/L (ref 4.5–11.5)

## 2019-11-16 PROCEDURE — 82044 UR ALBUMIN SEMIQUANTITATIVE: CPT

## 2019-11-16 PROCEDURE — 80061 LIPID PANEL: CPT

## 2019-11-16 PROCEDURE — 36415 COLL VENOUS BLD VENIPUNCTURE: CPT

## 2019-11-16 PROCEDURE — 80048 BASIC METABOLIC PNL TOTAL CA: CPT

## 2019-11-16 PROCEDURE — 83036 HEMOGLOBIN GLYCOSYLATED A1C: CPT

## 2019-11-16 PROCEDURE — 85027 COMPLETE CBC AUTOMATED: CPT

## 2019-11-16 PROCEDURE — 82570 ASSAY OF URINE CREATININE: CPT

## 2020-02-20 ENCOUNTER — HOSPITAL ENCOUNTER (OUTPATIENT)
Age: 60
Discharge: HOME | End: 2020-02-20
Payer: MEDICAID

## 2020-02-20 LAB
ALBUMIN SERPL-MCNC: 3.8 G/DL (ref 3.5–5.2)
ALP BLD-CCNC: 56 U/L (ref 35–104)
ALT SERPL-CCNC: 22 U/L (ref 0–32)
ANION GAP SERPL CALCULATED.3IONS-SCNC: 14 MMOL/L (ref 7–16)
AST SERPL-CCNC: 16 U/L (ref 0–31)
BASOPHILS ABSOLUTE: 0.07 E9/L (ref 0–0.2)
BASOPHILS RELATIVE PERCENT: 0.9 % (ref 0–2)
BILIRUB SERPL-MCNC: 0.5 MG/DL (ref 0–1.2)
BUN BLDV-MCNC: 15 MG/DL (ref 6–20)
CALCIUM SERPL-MCNC: 10 MG/DL (ref 8.6–10.2)
CHLORIDE BLD-SCNC: 100 MMOL/L (ref 98–107)
CHOLESTEROL, TOTAL: 167 MG/DL (ref 0–199)
CO2: 21 MMOL/L (ref 22–29)
CREAT SERPL-MCNC: 1 MG/DL (ref 0.5–1)
EOSINOPHILS ABSOLUTE: 0.15 E9/L (ref 0.05–0.5)
EOSINOPHILS RELATIVE PERCENT: 2 % (ref 0–6)
GFR AFRICAN AMERICAN: >60
GFR NON-AFRICAN AMERICAN: >60 ML/MIN/1.73
GLUCOSE BLD-MCNC: 164 MG/DL (ref 74–99)
HBA1C MFR BLD: 8.7 % (ref 4–5.6)
HCT VFR BLD CALC: 38.1 % (ref 34–48)
HDLC SERPL-MCNC: 47 MG/DL
HEMOGLOBIN: 12.2 G/DL (ref 11.5–15.5)
IMMATURE GRANULOCYTES #: 0.04 E9/L
IMMATURE GRANULOCYTES %: 0.5 % (ref 0–5)
LDL CHOLESTEROL CALCULATED: 89 MG/DL (ref 0–99)
LYMPHOCYTES ABSOLUTE: 2.49 E9/L (ref 1.5–4)
LYMPHOCYTES RELATIVE PERCENT: 32.8 % (ref 20–42)
MAGNESIUM: 1.6 MG/DL (ref 1.6–2.6)
MCH RBC QN AUTO: 28.7 PG (ref 26–35)
MCHC RBC AUTO-ENTMCNC: 32 % (ref 32–34.5)
MCV RBC AUTO: 89.6 FL (ref 80–99.9)
MONOCYTES ABSOLUTE: 0.55 E9/L (ref 0.1–0.95)
MONOCYTES RELATIVE PERCENT: 7.2 % (ref 2–12)
NEUTROPHILS ABSOLUTE: 4.3 E9/L (ref 1.8–7.3)
NEUTROPHILS RELATIVE PERCENT: 56.6 % (ref 43–80)
PARATHYROID HORMONE INTACT: 65 PG/ML (ref 15–65)
PDW BLD-RTO: 12 FL (ref 11.5–15)
PHOSPHORUS: 2.6 MG/DL (ref 2.5–4.5)
PLATELET # BLD: 378 E9/L (ref 130–450)
PMV BLD AUTO: 9.1 FL (ref 7–12)
POTASSIUM SERPL-SCNC: 4.5 MMOL/L (ref 3.5–5)
RBC # BLD: 4.25 E12/L (ref 3.5–5.5)
SODIUM BLD-SCNC: 135 MMOL/L (ref 132–146)
TOTAL PROTEIN: 7 G/DL (ref 6.4–8.3)
TRIGL SERPL-MCNC: 155 MG/DL (ref 0–149)
VITAMIN D 25-HYDROXY: 18 NG/ML (ref 30–100)
VLDLC SERPL CALC-MCNC: 31 MG/DL
WBC # BLD: 7.6 E9/L (ref 4.5–11.5)

## 2020-02-20 PROCEDURE — 84100 ASSAY OF PHOSPHORUS: CPT

## 2020-02-20 PROCEDURE — 36415 COLL VENOUS BLD VENIPUNCTURE: CPT

## 2020-02-20 PROCEDURE — 85025 COMPLETE CBC W/AUTO DIFF WBC: CPT

## 2020-02-20 PROCEDURE — 80053 COMPREHEN METABOLIC PANEL: CPT

## 2020-02-20 PROCEDURE — 83735 ASSAY OF MAGNESIUM: CPT

## 2020-02-20 PROCEDURE — 80061 LIPID PANEL: CPT

## 2020-02-20 PROCEDURE — 82306 VITAMIN D 25 HYDROXY: CPT

## 2020-02-20 PROCEDURE — 83970 ASSAY OF PARATHORMONE: CPT

## 2020-02-20 PROCEDURE — 83036 HEMOGLOBIN GLYCOSYLATED A1C: CPT

## 2020-02-25 PROBLEM — N17.9 AKI (ACUTE KIDNEY INJURY) (HCC): Status: RESOLVED | Noted: 2018-02-16 | Resolved: 2020-02-25

## 2020-03-18 ENCOUNTER — HOSPITAL ENCOUNTER (OUTPATIENT)
Age: 60
Discharge: HOME | End: 2020-03-18
Payer: MEDICAID

## 2020-03-18 LAB
ALBUMIN SERPL-MCNC: 4.3 G/DL (ref 3.5–5.2)
ALP BLD-CCNC: 57 U/L (ref 35–104)
ALT SERPL-CCNC: 24 U/L (ref 0–32)
ANION GAP SERPL CALCULATED.3IONS-SCNC: 14 MMOL/L (ref 7–16)
AST SERPL-CCNC: 14 U/L (ref 0–31)
BASOPHILS ABSOLUTE: 0.07 E9/L (ref 0–0.2)
BASOPHILS RELATIVE PERCENT: 0.8 % (ref 0–2)
BILIRUB SERPL-MCNC: 0.4 MG/DL (ref 0–1.2)
BILIRUBIN URINE: NEGATIVE
BLOOD, URINE: NEGATIVE
BUN BLDV-MCNC: 24 MG/DL (ref 6–20)
CALCIUM SERPL-MCNC: 10.9 MG/DL (ref 8.6–10.2)
CHLORIDE BLD-SCNC: 98 MMOL/L (ref 98–107)
CLARITY: CLEAR
CO2: 23 MMOL/L (ref 22–29)
COLOR: YELLOW
CREAT SERPL-MCNC: 1.2 MG/DL (ref 0.5–1)
CREATININE URINE: 128 MG/DL (ref 29–226)
EOSINOPHILS ABSOLUTE: 0.15 E9/L (ref 0.05–0.5)
EOSINOPHILS RELATIVE PERCENT: 1.8 % (ref 0–6)
GFR AFRICAN AMERICAN: 56
GFR NON-AFRICAN AMERICAN: 56 ML/MIN/1.73
GLUCOSE BLD-MCNC: 209 MG/DL (ref 74–99)
GLUCOSE URINE: 100 MG/DL
HCT VFR BLD CALC: 39.3 % (ref 34–48)
HEMOGLOBIN: 12.6 G/DL (ref 11.5–15.5)
IMMATURE GRANULOCYTES #: 0.07 E9/L
IMMATURE GRANULOCYTES %: 0.8 % (ref 0–5)
KETONES, URINE: NEGATIVE MG/DL
LEUKOCYTE ESTERASE, URINE: NEGATIVE
LYMPHOCYTES ABSOLUTE: 2.82 E9/L (ref 1.5–4)
LYMPHOCYTES RELATIVE PERCENT: 33.5 % (ref 20–42)
MAGNESIUM: 1.9 MG/DL (ref 1.6–2.6)
MCH RBC QN AUTO: 28.4 PG (ref 26–35)
MCHC RBC AUTO-ENTMCNC: 32.1 % (ref 32–34.5)
MCV RBC AUTO: 88.7 FL (ref 80–99.9)
MONOCYTES ABSOLUTE: 0.52 E9/L (ref 0.1–0.95)
MONOCYTES RELATIVE PERCENT: 6.2 % (ref 2–12)
NEUTROPHILS ABSOLUTE: 4.79 E9/L (ref 1.8–7.3)
NEUTROPHILS RELATIVE PERCENT: 56.9 % (ref 43–80)
NITRITE, URINE: NEGATIVE
PDW BLD-RTO: 12.1 FL (ref 11.5–15)
PH UA: 5.5 (ref 5–9)
PHOSPHORUS: 2.8 MG/DL (ref 2.5–4.5)
PLATELET # BLD: 372 E9/L (ref 130–450)
PMV BLD AUTO: 9.1 FL (ref 7–12)
POTASSIUM SERPL-SCNC: 4.7 MMOL/L (ref 3.5–5)
PROTEIN PROTEIN: 12 MG/DL (ref 0–12)
PROTEIN UA: NEGATIVE MG/DL
PROTEIN/CREAT RATIO: 0.1
PROTEIN/CREAT RATIO: 0.1 (ref 0–0.2)
RBC # BLD: 4.43 E12/L (ref 3.5–5.5)
SODIUM BLD-SCNC: 135 MMOL/L (ref 132–146)
SPECIFIC GRAVITY UA: 1.02 (ref 1–1.03)
TOTAL PROTEIN: 7.7 G/DL (ref 6.4–8.3)
UROBILINOGEN, URINE: 0.2 E.U./DL
WBC # BLD: 8.4 E9/L (ref 4.5–11.5)

## 2020-03-18 PROCEDURE — 80053 COMPREHEN METABOLIC PANEL: CPT

## 2020-03-18 PROCEDURE — 84100 ASSAY OF PHOSPHORUS: CPT

## 2020-03-18 PROCEDURE — 36415 COLL VENOUS BLD VENIPUNCTURE: CPT

## 2020-03-18 PROCEDURE — 84156 ASSAY OF PROTEIN URINE: CPT

## 2020-03-18 PROCEDURE — 83735 ASSAY OF MAGNESIUM: CPT

## 2020-03-18 PROCEDURE — 82570 ASSAY OF URINE CREATININE: CPT

## 2020-03-18 PROCEDURE — 85025 COMPLETE CBC W/AUTO DIFF WBC: CPT

## 2020-03-18 PROCEDURE — 81003 URINALYSIS AUTO W/O SCOPE: CPT

## 2020-03-25 ENCOUNTER — HOSPITAL ENCOUNTER (OUTPATIENT)
Age: 60
Discharge: HOME | End: 2020-03-25
Payer: MEDICAID

## 2020-03-25 PROCEDURE — 87449 NOS EACH ORGANISM AG IA: CPT

## 2020-03-25 PROCEDURE — 87324 CLOSTRIDIUM AG IA: CPT

## 2020-03-25 PROCEDURE — 89055 LEUKOCYTE ASSESSMENT FECAL: CPT

## 2020-03-25 PROCEDURE — 83520 IMMUNOASSAY QUANT NOS NONAB: CPT

## 2020-03-25 PROCEDURE — 87045 FECES CULTURE AEROBIC BACT: CPT

## 2020-03-25 PROCEDURE — 87329 GIARDIA AG IA: CPT

## 2020-03-25 PROCEDURE — 87328 CRYPTOSPORIDIUM AG IA: CPT

## 2020-03-25 PROCEDURE — 87077 CULTURE AEROBIC IDENTIFY: CPT

## 2020-03-26 LAB
C DIFF TOXIN/ANTIGEN: NORMAL
CRYPTOSPORIDIUM ANTIGEN STOOL: NORMAL
GIARDIA ANTIGEN STOOL: NORMAL
WHITE BLOOD CELLS (WBC), STOOL: NORMAL

## 2020-03-27 LAB — CULTURE, STOOL: NORMAL

## 2020-03-28 LAB — PANCREATIC ELASTASE, FECAL: >500 UG/G

## 2020-10-29 ENCOUNTER — HOSPITAL ENCOUNTER (OUTPATIENT)
Age: 60
Discharge: HOME | End: 2020-10-29
Payer: MEDICAID

## 2020-10-29 LAB
ANION GAP SERPL CALCULATED.3IONS-SCNC: 19 MMOL/L (ref 7–16)
BUN BLDV-MCNC: 23 MG/DL (ref 8–23)
CALCIUM SERPL-MCNC: 11 MG/DL (ref 8.6–10.2)
CHLORIDE BLD-SCNC: 97 MMOL/L (ref 98–107)
CO2: 18 MMOL/L (ref 22–29)
CREAT SERPL-MCNC: 1 MG/DL (ref 0.5–1)
GFR AFRICAN AMERICAN: >60
GFR NON-AFRICAN AMERICAN: >60 ML/MIN/1.73
GLUCOSE BLD-MCNC: 336 MG/DL (ref 74–99)
HBA1C MFR BLD: 10.3 % (ref 4–5.6)
POTASSIUM SERPL-SCNC: 4.8 MMOL/L (ref 3.5–5)
SODIUM BLD-SCNC: 134 MMOL/L (ref 132–146)

## 2020-10-29 PROCEDURE — 80048 BASIC METABOLIC PNL TOTAL CA: CPT

## 2020-10-29 PROCEDURE — 83036 HEMOGLOBIN GLYCOSYLATED A1C: CPT

## 2020-10-29 PROCEDURE — 36415 COLL VENOUS BLD VENIPUNCTURE: CPT

## 2020-11-02 PROBLEM — I10 ESSENTIAL HYPERTENSION: Status: ACTIVE | Noted: 2020-11-02

## 2021-06-09 ENCOUNTER — HOSPITAL ENCOUNTER (OUTPATIENT)
Age: 61
Discharge: HOME | End: 2021-06-09
Payer: MEDICAID

## 2021-06-09 DIAGNOSIS — E11.65 TYPE 2 DIABETES MELLITUS WITH HYPERGLYCEMIA, WITH LONG-TERM CURRENT USE OF INSULIN (HCC): ICD-10-CM

## 2021-06-09 DIAGNOSIS — Z11.4 ENCOUNTER FOR SCREENING FOR HIV: ICD-10-CM

## 2021-06-09 DIAGNOSIS — Z79.4 TYPE 2 DIABETES MELLITUS WITH HYPERGLYCEMIA, WITH LONG-TERM CURRENT USE OF INSULIN (HCC): ICD-10-CM

## 2021-06-09 DIAGNOSIS — Z11.59 ENCOUNTER FOR HEPATITIS C SCREENING TEST FOR LOW RISK PATIENT: ICD-10-CM

## 2021-06-09 LAB — HBA1C MFR BLD: 10.5 % (ref 4–5.6)

## 2021-06-09 PROCEDURE — 86803 HEPATITIS C AB TEST: CPT

## 2021-06-09 PROCEDURE — 83036 HEMOGLOBIN GLYCOSYLATED A1C: CPT

## 2021-06-09 PROCEDURE — 36415 COLL VENOUS BLD VENIPUNCTURE: CPT

## 2021-06-09 PROCEDURE — 86703 HIV-1/HIV-2 1 RESULT ANTBDY: CPT

## 2021-06-09 PROCEDURE — 87522 HEPATITIS C REVRS TRNSCRPJ: CPT

## 2021-06-10 LAB
HEPATITIS C ANTIBODY INTERPRETATION: NORMAL
HIV-1 AND HIV-2 ANTIBODIES: NORMAL

## 2021-06-13 LAB
HCV QNT BY NAAT IU/ML: NOT DETECTED IU/ML
HCV QNT BY NAAT LOG IU/ML: NOT DETECTED LOG IU/ML
INTERPRETATION: NOT DETECTED

## 2021-10-06 ENCOUNTER — HOSPITAL ENCOUNTER (OUTPATIENT)
Age: 61
Discharge: HOME | End: 2021-10-06
Payer: MEDICAID

## 2021-10-06 DIAGNOSIS — Z79.4 TYPE 2 DIABETES MELLITUS WITH HYPERGLYCEMIA, WITH LONG-TERM CURRENT USE OF INSULIN (HCC): ICD-10-CM

## 2021-10-06 DIAGNOSIS — E11.65 TYPE 2 DIABETES MELLITUS WITH HYPERGLYCEMIA, WITH LONG-TERM CURRENT USE OF INSULIN (HCC): ICD-10-CM

## 2021-10-06 LAB
CHOLESTEROL, TOTAL: 215 MG/DL (ref 0–199)
CREATININE URINE: 245 MG/DL (ref 29–226)
HBA1C MFR BLD: 9.8 % (ref 4–5.6)
HDLC SERPL-MCNC: 54 MG/DL
LDL CHOLESTEROL CALCULATED: 115 MG/DL (ref 0–99)
MICROALBUMIN UR-MCNC: 39.5 MG/L
MICROALBUMIN/CREAT UR-RTO: 16.1 (ref 0–30)
TRIGL SERPL-MCNC: 229 MG/DL (ref 0–149)
VLDLC SERPL CALC-MCNC: 46 MG/DL

## 2021-10-06 PROCEDURE — 82044 UR ALBUMIN SEMIQUANTITATIVE: CPT

## 2021-10-06 PROCEDURE — 83036 HEMOGLOBIN GLYCOSYLATED A1C: CPT

## 2021-10-06 PROCEDURE — 36415 COLL VENOUS BLD VENIPUNCTURE: CPT

## 2021-10-06 PROCEDURE — 80061 LIPID PANEL: CPT

## 2021-10-06 PROCEDURE — 82570 ASSAY OF URINE CREATININE: CPT

## 2021-10-13 PROBLEM — E83.52 HYPERCALCEMIA: Status: ACTIVE | Noted: 2021-10-13

## 2021-10-13 PROBLEM — N18.2 STAGE 2 CHRONIC KIDNEY DISEASE: Status: RESOLVED | Noted: 2018-02-28 | Resolved: 2021-10-13

## 2021-10-13 PROBLEM — E66.01 MORBID OBESITY WITH BMI OF 50.0-59.9, ADULT (HCC): Status: RESOLVED | Noted: 2018-02-28 | Resolved: 2021-10-13

## 2021-10-27 ENCOUNTER — HOSPITAL ENCOUNTER (OUTPATIENT)
Dept: GENERAL RADIOLOGY | Age: 61
Discharge: HOME | End: 2021-10-29
Payer: MEDICAID

## 2021-10-27 DIAGNOSIS — Z12.31 ENCOUNTER FOR SCREENING MAMMOGRAM FOR MALIGNANT NEOPLASM OF BREAST: ICD-10-CM

## 2021-10-27 PROCEDURE — 77063 BREAST TOMOSYNTHESIS BI: CPT

## 2021-12-15 ENCOUNTER — OFFICE VISIT (OUTPATIENT)
Dept: ENDOCRINOLOGY | Age: 61
End: 2021-12-15
Payer: MEDICAID

## 2021-12-15 VITALS
HEART RATE: 79 BPM | OXYGEN SATURATION: 95 % | WEIGHT: 293 LBS | SYSTOLIC BLOOD PRESSURE: 166 MMHG | BODY MASS INDEX: 47.09 KG/M2 | DIASTOLIC BLOOD PRESSURE: 119 MMHG | HEIGHT: 66 IN

## 2021-12-15 DIAGNOSIS — E55.9 VITAMIN D DEFICIENCY: ICD-10-CM

## 2021-12-15 DIAGNOSIS — E66.01 CLASS 3 SEVERE OBESITY DUE TO EXCESS CALORIES WITHOUT SERIOUS COMORBIDITY WITH BODY MASS INDEX (BMI) OF 45.0 TO 49.9 IN ADULT (HCC): ICD-10-CM

## 2021-12-15 DIAGNOSIS — I10 PRIMARY HYPERTENSION: ICD-10-CM

## 2021-12-15 DIAGNOSIS — E11.65 UNCONTROLLED TYPE 2 DIABETES MELLITUS WITH HYPERGLYCEMIA (HCC): Primary | ICD-10-CM

## 2021-12-15 LAB — HBA1C MFR BLD: 8.3 %

## 2021-12-15 PROCEDURE — 3017F COLORECTAL CA SCREEN DOC REV: CPT | Performed by: CLINICAL NURSE SPECIALIST

## 2021-12-15 PROCEDURE — 83036 HEMOGLOBIN GLYCOSYLATED A1C: CPT | Performed by: CLINICAL NURSE SPECIALIST

## 2021-12-15 PROCEDURE — G8484 FLU IMMUNIZE NO ADMIN: HCPCS | Performed by: CLINICAL NURSE SPECIALIST

## 2021-12-15 PROCEDURE — 3046F HEMOGLOBIN A1C LEVEL >9.0%: CPT | Performed by: CLINICAL NURSE SPECIALIST

## 2021-12-15 PROCEDURE — G8417 CALC BMI ABV UP PARAM F/U: HCPCS | Performed by: CLINICAL NURSE SPECIALIST

## 2021-12-15 PROCEDURE — G8427 DOCREV CUR MEDS BY ELIG CLIN: HCPCS | Performed by: CLINICAL NURSE SPECIALIST

## 2021-12-15 PROCEDURE — 2022F DILAT RTA XM EVC RTNOPTHY: CPT | Performed by: CLINICAL NURSE SPECIALIST

## 2021-12-15 PROCEDURE — 1036F TOBACCO NON-USER: CPT | Performed by: CLINICAL NURSE SPECIALIST

## 2021-12-15 PROCEDURE — 99205 OFFICE O/P NEW HI 60 MIN: CPT | Performed by: CLINICAL NURSE SPECIALIST

## 2021-12-15 RX ORDER — EMPAGLIFLOZIN 10 MG/1
10 TABLET, FILM COATED ORAL DAILY
Qty: 90 TABLET | Refills: 3 | Status: SHIPPED
Start: 2021-12-15 | End: 2022-06-22

## 2021-12-15 NOTE — PROGRESS NOTES
[address] [facility] Department of Endocrinology Diabetes and Metabolism   [address] [facility] [address]   Phone: [phone]  Fax: [phone]    Date of Service: 12/15/2021    Primary Care Physician: Gregg Pedro DO  Referring physician: Nakia Moreno DO  Provider: CARLEY Godinez - CNS     Reason for the visit:  Controlled type 2 diabetes mellitus      History of Present Illness: The history is provided by the patient. No  was used. Accuracy of the patient data is excellent. Eusebio Lopez is a very pleasant 64 y.o. female seen today for diabetes management     Eusebio Lopez was diagnosed with diabetes at age of 54  and currently on  lantus 20 units at night, Metformin Er 750 mg BID, and amaryl 4 mg daily, rybelsus 14 mg daily   She could not tolerate Trulicity in the past as it caused GI issues  The patient has been checking blood sugar 3-4 times per week   BG usually 120-160's   .     Most recent A1c results summarized below  Lab Results   Component Value Date    LABA1C 9.8 10/06/2021    LABA1C 10.5 06/09/2021    LABA1C 10.3 10/29/2020       Patient has had no hypoglycemic episodes   The patient has been mindful of what has been eating and following diabetic diet as encouraged    I reviewed current medications and the patient has no issues with diabetes medications  Eusebio Lopez is up to date with eye exam and denied any history of diabetic retinopathy       And also performs  own feet care  Microvascular complications:  No Retinopathy, Nephropathy or Neuropathy   Macrovascular complications: no CAD, PVD, or Stroke  The patient refuses Flushot and pneumonia vaccine   No HX of pancreatitis  No Hx of MTC  No HX of gastroparesis   No HX of UTI/Mycotic infection     PAST MEDICAL HISTORY   Past Medical History:   Diagnosis Date    [facility]est[facility]) [facility] [address]     Chronic kidney disease     Diabetes mellitus ([facility] [address]     Hypertension     Hypoparathyroidism ([facility] [address]        PAST SURGICAL HISTORY   Past Surgical History:   Procedure Laterality Date    BREAST BIOPSY Left 04/02/2013    benign    OVARIAN CYST REMOVAL  12/05/08    left    SUREKHA AND BSO      TONSILLECTOMY         SOCIAL HISTORY   Tobacco:   reports that she has never smoked. She has never used smokeless tobacco.  Alcohol:   reports no history of alcohol use. Drugs:   reports no history of drug use.     FAMILY HISTORY   Family History   Problem Relation Age of Onset    Lung Cancer Maternal Uncle     Cancer Maternal Aunt         Thymus       ALLERGIES AND DRUG REACTIONS   Allergies   Allergen Reactions    Trulicity [Dulaglutide] Nausea Only and Other (See Comments)     Severe GI issues       CURRENT MEDICATIONS   Current Outpatient Medications   Medication Sig Dispense Refill    empagliflozin (JARDIANCE) 10 MG tablet Take 1 tablet by mouth daily 90 tablet 3    glimepiride (AMARYL) 4 MG tablet TAKE ONE TABLET BY MOUTH DAILY WITH BREAKFAST 30 tablet 5    metFORMIN (GLUCOPHAGE-XR) 750 MG extended release tablet TAKE ONE TABLET BY MOUTH TWO TIMES A DAY 60 tablet 5    insulin glargine (LANTUS SOLOSTAR) 100 UNIT/ML injection pen Inject 20 Units into the skin nightly 5 pen 0    Semaglutide 14 MG TABS Take 14 mg by mouth every morning (before breakfast) 30 tablet 5    Insulin Glargine, 1 Unit Dial, 300 UNIT/ML SOPN Inject 20 Units into the skin nightly 1 pen 5    carvedilol (COREG) 25 MG tablet Take 25 mg by mouth 2 times daily      doxazosin (CARDURA) 2 MG tablet TAKE ONE TABLET BY MOUTH AT BEDTIME      furosemide (LASIX) 40 MG tablet 1 tablet as needed  3    spironolactone (ALDACTONE) 25 MG tablet TAKE ONE TABLET BY MOUTH DAILY 30 tablet 1    ONE TOUCH ULTRA TEST strip USE 1 TWICE DAILY  3    Blood Glucose Monitoring Suppl (ONE TOUCH ULTRA 2) w/Device KIT use as directed  0    ONE TOUCH LANCETS MISC 1 each by Does not apply route 2 times daily Please dispense brand/quantity lancets need for patient's testing device 100 each 3    vitamin D (ERGOCALCIFEROL) 97695 UNITS CAPS capsule Take 50,000 Units by mouth every 14 days        No current facility-administered medications for this visit. Review of Systems  Constitutional: No fever, no chills, no diaphoresis, no generalized weakness. HEENT: No blurred vision, No sore throat, no ear pain, no hair loss  Neck: denied any neck swelling, difficulty swallowing,   Cardio-pulmonary: No CP, SOB or palpitation, No orthopnea or PND. No cough or wheezing. GI: No N/V/D, no constipation, No abdominal pain, no melena or hematochezia   : Denied any dysuria, hematuria, flank pain, discharge, or incontinence. Skin: denied any rash, ulcer, Hirsute, or hyperpigmentation. MSK: denied any joint deformity, joint pain/swelling, muscle pain, or back pain. Neuro: no numbness, no tingling, no weakness, _    OBJECTIVE    BP (!) 166/119   Pulse 79   Ht 5' 6\" (1.676 m)   Wt 300 lb (136.1 kg)   SpO2 95%   BMI 48.42 kg/m²   BP Readings from Last 4 Encounters:   12/15/21 (!) 166/119   10/13/21 (!) 142/100   06/23/21 130/82   05/19/21 132/80     Wt Readings from Last 6 Encounters:   12/15/21 300 lb (136.1 kg)   10/13/21 (!) 307 lb (139.3 kg)   06/23/21 (!) 310 lb (140.6 kg)   05/19/21 (!) 316 lb (143.3 kg)   11/02/20 (!) 326 lb (147.9 kg)   02/25/20 (!) 326 lb (147.9 kg)       Physical examination:  General: awake alert, oriented x3, no abnormal position or movements. HEENT: normocephalic non-traumatic, no exophthalmos   Neck: supple, no LN enlargement, no thyromegaly, no thyroid tenderness, no JVD. Pulm: Clear equal air entry no added sounds, no wheezing or rhonchi    CVS: S1 + S2, no murmur, no heave. Dorsalis pedis pulse palpable   Abd: soft lax, no tenderness, no organomegaly, audible bowel sounds. Skin: warm, no lesions, no rash.  No callus, no Ulcers, No acanthosis nigricans  Musculoskeletal: No back tenderness, no kyphosis/scoliosis    Neuro: CN intact, Monofilament sensation normal  bilateral , muscle power normal  Psych: normal mood, and affect      Review of Laboratory Data:  I personally reviewed the following lab:  Lab Results   Component Value Date/Time    WBC 8.4 03/18/2020 12:10 PM    RBC 4.43 03/18/2020 12:10 PM    HGB 12.6 03/18/2020 12:10 PM    HCT 39.3 03/18/2020 12:10 PM    MCV 88.7 03/18/2020 12:10 PM    MCH 28.4 03/18/2020 12:10 PM    MCHC 32.1 03/18/2020 12:10 PM    RDW 12.1 03/18/2020 12:10 PM     03/18/2020 12:10 PM    MPV 9.1 03/18/2020 12:10 PM      Lab Results   Component Value Date/Time     10/29/2020 01:30 PM    K 4.8 10/29/2020 01:30 PM    CO2 18 (L) 10/29/2020 01:30 PM    BUN 23 10/29/2020 01:30 PM    CREATININE 1.0 10/29/2020 01:30 PM    CALCIUM 11.0 (H) 10/29/2020 01:30 PM    LABGLOM >60 10/29/2020 01:30 PM    GFRAA >60 10/29/2020 01:30 PM      Lab Results   Component Value Date/Time    TSH 2.110 10/24/2017 09:15 AM    T4FREE 1.08 09/17/2014 11:39 AM    T0QYDPG 7.4 09/17/2014 11:39 AM    FT3 2.2 09/17/2014 11:39 AM    Y5FPTAW 90.57 09/17/2014 11:39 AM    TPOABS 0.4 09/17/2014 11:39 AM     Lab Results   Component Value Date    LABA1C 9.8 10/06/2021    GLUCOSE 276 06/23/2021    GLUCOSE 77 12/07/2010    MALBCR 16.1 10/06/2021    LABMICR 39.5 10/06/2021    LABCREA 245 10/06/2021     Lab Results   Component Value Date    LABA1C 9.8 10/06/2021    LABA1C 10.5 06/09/2021    LABA1C 10.3 10/29/2020     Lab Results   Component Value Date    TRIG 229 10/06/2021    HDL 54 10/06/2021    LDLCALC 115 10/06/2021    CHOL 215 10/06/2021     Lab Results   Component Value Date    VITD25 18 02/20/2020    VITD25 16 11/04/2019       ASSESSMENT & RECOMMENDATIONS   Emma Nevlile, a 64 y.o.-old female seen in for the following issues       Assessment:      Diagnosis Orders   1. Uncontrolled type 2 diabetes mellitus with hyperglycemia ([address]     2.  Uncontrolled type 2 diabetes mellitus with microalbuminuria (HCC)     3. Class 3 severe obesity interpreted labs associated with the above diagnosis. Return in about 3 months (around 3/15/2022). The above issues were reviewed with the patient who understood and agreed with the plan. Thank you for allowing us to participate in the care of this patient. Please do not hesitate to contact us with any additional questions. CARLEY Frederick - NONA     [facility] - BE[facility]AVIOR[facility] Diabetes Care and Endocrinology   [address] [facility] [address]   Phone: [phone]  Fax: [phone]  --------------------------------------------  An electronic signature was used to authenticate this note.  Edel CASTELLANO on 12/15/2021 at 3:55 PM

## 2022-01-26 DIAGNOSIS — E11.65 UNCONTROLLED TYPE 2 DIABETES MELLITUS WITH HYPERGLYCEMIA (HCC): Primary | ICD-10-CM

## 2022-01-26 RX ORDER — INSULIN GLARGINE 100 [IU]/ML
20 INJECTION, SOLUTION SUBCUTANEOUS NIGHTLY
Qty: 15 ML | Refills: 5 | Status: SHIPPED
Start: 2022-01-26 | End: 2022-02-16

## 2022-02-02 ENCOUNTER — HOSPITAL ENCOUNTER (OUTPATIENT)
Age: 62
Discharge: HOME | End: 2022-02-02
Payer: MEDICAID

## 2022-02-02 DIAGNOSIS — E11.65 TYPE 2 DIABETES MELLITUS WITH HYPERGLYCEMIA, WITH LONG-TERM CURRENT USE OF INSULIN (HCC): ICD-10-CM

## 2022-02-02 DIAGNOSIS — Z79.4 TYPE 2 DIABETES MELLITUS WITH HYPERGLYCEMIA, WITH LONG-TERM CURRENT USE OF INSULIN (HCC): ICD-10-CM

## 2022-02-02 LAB
ANION GAP SERPL CALCULATED.3IONS-SCNC: 16 MMOL/L (ref 7–16)
BUN BLDV-MCNC: 14 MG/DL (ref 6–23)
CALCIUM SERPL-MCNC: 9.9 MG/DL (ref 8.6–10.2)
CHLORIDE BLD-SCNC: 99 MMOL/L (ref 98–107)
CHOLESTEROL, TOTAL: 201 MG/DL (ref 0–199)
CO2: 22 MMOL/L (ref 22–29)
CREAT SERPL-MCNC: 0.9 MG/DL (ref 0.5–1)
GFR AFRICAN AMERICAN: >60
GFR NON-AFRICAN AMERICAN: >60 ML/MIN/1.73
GLUCOSE BLD-MCNC: 88 MG/DL (ref 74–99)
HBA1C MFR BLD: 7.4 % (ref 4–5.6)
HCT VFR BLD CALC: 39.9 % (ref 34–48)
HDLC SERPL-MCNC: 49 MG/DL
HEMOGLOBIN: 12.7 G/DL (ref 11.5–15.5)
LDL CHOLESTEROL CALCULATED: 117 MG/DL (ref 0–99)
MCH RBC QN AUTO: 29.1 PG (ref 26–35)
MCHC RBC AUTO-ENTMCNC: 31.8 % (ref 32–34.5)
MCV RBC AUTO: 91.5 FL (ref 80–99.9)
PDW BLD-RTO: 12.4 FL (ref 11.5–15)
PLATELET # BLD: 371 E9/L (ref 130–450)
PMV BLD AUTO: 9.3 FL (ref 7–12)
POTASSIUM SERPL-SCNC: 4.3 MMOL/L (ref 3.5–5)
RBC # BLD: 4.36 E12/L (ref 3.5–5.5)
SODIUM BLD-SCNC: 137 MMOL/L (ref 132–146)
TRIGL SERPL-MCNC: 175 MG/DL (ref 0–149)
VLDLC SERPL CALC-MCNC: 35 MG/DL
WBC # BLD: 8.5 E9/L (ref 4.5–11.5)

## 2022-02-02 PROCEDURE — 80048 BASIC METABOLIC PNL TOTAL CA: CPT

## 2022-02-02 PROCEDURE — 83036 HEMOGLOBIN GLYCOSYLATED A1C: CPT

## 2022-02-02 PROCEDURE — 36415 COLL VENOUS BLD VENIPUNCTURE: CPT

## 2022-02-02 PROCEDURE — 80061 LIPID PANEL: CPT

## 2022-02-02 PROCEDURE — 85027 COMPLETE CBC AUTOMATED: CPT

## 2022-02-16 PROBLEM — E66.813 CLASS 3 SEVERE OBESITY DUE TO EXCESS CALORIES WITH SERIOUS COMORBIDITY AND BODY MASS INDEX (BMI) OF 45.0 TO 49.9 IN ADULT: Status: ACTIVE | Noted: 2018-02-28

## 2022-04-12 RX ORDER — ORAL SEMAGLUTIDE 14 MG/1
TABLET ORAL
Qty: 30 TABLET | Refills: 5 | Status: SHIPPED
Start: 2022-04-12 | End: 2022-10-10 | Stop reason: SDUPTHER

## 2022-04-20 ENCOUNTER — OFFICE VISIT (OUTPATIENT)
Dept: ENDOCRINOLOGY | Age: 62
End: 2022-04-20
Payer: MEDICAID

## 2022-04-20 VITALS
SYSTOLIC BLOOD PRESSURE: 188 MMHG | WEIGHT: 293 LBS | DIASTOLIC BLOOD PRESSURE: 115 MMHG | HEART RATE: 92 BPM | BODY MASS INDEX: 47.09 KG/M2 | HEIGHT: 66 IN

## 2022-04-20 DIAGNOSIS — E11.9 TYPE 2 DIABETES MELLITUS WITHOUT COMPLICATION, WITH LONG-TERM CURRENT USE OF INSULIN (HCC): ICD-10-CM

## 2022-04-20 DIAGNOSIS — Z79.4 TYPE 2 DIABETES MELLITUS WITHOUT COMPLICATION, WITH LONG-TERM CURRENT USE OF INSULIN (HCC): ICD-10-CM

## 2022-04-20 DIAGNOSIS — E66.01 CLASS 3 SEVERE OBESITY DUE TO EXCESS CALORIES WITHOUT SERIOUS COMORBIDITY WITH BODY MASS INDEX (BMI) OF 45.0 TO 49.9 IN ADULT (HCC): ICD-10-CM

## 2022-04-20 DIAGNOSIS — E55.9 VITAMIN D DEFICIENCY: Primary | ICD-10-CM

## 2022-04-20 PROCEDURE — 1036F TOBACCO NON-USER: CPT | Performed by: INTERNAL MEDICINE

## 2022-04-20 PROCEDURE — G8417 CALC BMI ABV UP PARAM F/U: HCPCS | Performed by: INTERNAL MEDICINE

## 2022-04-20 PROCEDURE — 2022F DILAT RTA XM EVC RTNOPTHY: CPT | Performed by: INTERNAL MEDICINE

## 2022-04-20 PROCEDURE — 99214 OFFICE O/P EST MOD 30 MIN: CPT | Performed by: INTERNAL MEDICINE

## 2022-04-20 PROCEDURE — 3051F HG A1C>EQUAL 7.0%<8.0%: CPT | Performed by: INTERNAL MEDICINE

## 2022-04-20 PROCEDURE — G8428 CUR MEDS NOT DOCUMENT: HCPCS | Performed by: INTERNAL MEDICINE

## 2022-04-20 PROCEDURE — 3017F COLORECTAL CA SCREEN DOC REV: CPT | Performed by: INTERNAL MEDICINE

## 2022-04-20 RX ORDER — GLUCOSAMINE HCL/CHONDROITIN SU 500-400 MG
CAPSULE ORAL
Qty: 100 STRIP | Refills: 3 | Status: SHIPPED | OUTPATIENT
Start: 2022-04-20

## 2022-04-20 NOTE — PROGRESS NOTES
[address] [place] [address] Department of Endocrinology Diabetes and Metabolism   [address] [facility] [address]   Phone: [phone]  Fax: [phone]    Date of Service: 4/20/2022  Primary Care Physician: Aida Corcoran DO  Provider: Jorge Luis Villa MD     Reason for the visit:  Controlled type 2 diabetes mellitus    History of Present Illness: The history is provided by the patient. No  was used. Accuracy of the patient data is excellent.   Sunil Das is a very pleasant 64 y.o. female seen today for diabetes management     Sunil Das was diagnosed with diabetes at age of 54  and currently on  lantus 20 units at night, Metformin Er 750 mg BID, and amaryl 4 mg daily, rybelsus 14 mg daily   She could not tolerate Trulicity in the past as it caused GI issues  The patient has been checking blood sugar 3-4 times per week and most readinsg at goal   Most recent A1c results summarized below  Lab Results   Component Value Date    LABA1C 7.4 02/02/2022    LABA1C 8.3 12/15/2021    LABA1C 9.8 10/06/2021       Patient has had no hypoglycemic episodes   The patient has been mindful of what has been eating and following diabetic diet as encouraged    I reviewed current medications and the patient has no issues with diabetes medications  Sunil Das is up to date with eye exam and denied any history of diabetic retinopathy       And also performs  own feet care  Microvascular complications:  No Retinopathy, Nephropathy or Neuropathy   Macrovascular complications: no CAD, PVD, or Stroke  The patient refuses Flushot and pneumonia vaccine   No HX of pancreatitis  No Hx of MTC  No HX of gastroparesis   No HX of UTI/Mycotic infection     PAST MEDICAL HISTORY   Past Medical History:   Diagnosis Date    [facility]est[facility]) [facility] [address]     Chronic kidney disease     Diabetes mellitus [facility] [address]     Hypertension     Hypoparathyroidism ([facility] [address]        PAST SURGICAL HISTORY Past Surgical History:   Procedure Laterality Date    BREAST BIOPSY Left 04/02/2013    benign    OVARIAN CYST REMOVAL  12/05/08    left    SUREKHA AND BSO      TONSILLECTOMY         SOCIAL HISTORY   Tobacco:   reports that she has never smoked. She has never used smokeless tobacco.  Alcohol:   reports no history of alcohol use. Drugs:   reports no history of drug use.     FAMILY HISTORY   Family History   Problem Relation Age of Onset    Lung Cancer Maternal Uncle     Cancer Maternal Aunt         Thymus       ALLERGIES AND DRUG REACTIONS   Allergies   Allergen Reactions    Trulicity [Dulaglutide] Nausea Only and Other (See Comments)     Severe GI issues       CURRENT MEDICATIONS   Current Outpatient Medications   Medication Sig Dispense Refill    Semaglutide (RYBELSUS) 14 MG TABS Take 1 tablet daily 30 tablet 5    carvedilol (COREG) 12.5 MG tablet TAKE ONE TABLET BY MOUTH TWICE A DAY WITH MEALS      SENSIPAR 30 MG tablet TAKE ONE TABLET BY MOUTH ONCE DAILY      insulin glargine (LANTUS SOLOSTAR) 100 UNIT/ML injection pen Inject 14 Units into the skin nightly 15 mL 5    glimepiride (AMARYL) 2 MG tablet TAKE ONE TABLET BY MOUTH DAILY WITH BREAKFAST 90 tablet 3    Insulin Pen Needle 32G X 4 MM MISC 1 each by Does not apply route daily 100 each 3    empagliflozin (JARDIANCE) 10 MG tablet Take 1 tablet by mouth daily (Patient not taking: Reported on 2/16/2022) 90 tablet 3    metFORMIN (GLUCOPHAGE-XR) 750 MG extended release tablet TAKE ONE TABLET BY MOUTH TWO TIMES A DAY 60 tablet 5    Semaglutide 14 MG TABS Take 14 mg by mouth every morning (before breakfast) 30 tablet 5    carvedilol (COREG) 25 MG tablet Take 25 mg by mouth 2 times daily      doxazosin (CARDURA) 2 MG tablet TAKE ONE TABLET BY MOUTH AT BEDTIME      furosemide (LASIX) 40 MG tablet 1 tablet as needed  3    spironolactone (ALDACTONE) 25 MG tablet TAKE ONE TABLET BY MOUTH DAILY 30 tablet 1    ONE TOUCH ULTRA TEST strip USE 1 TWICE DAILY 3    Blood Glucose Monitoring Suppl (ONE TOUCH ULTRA 2) w/Device KIT use as directed  0    ONE TOUCH LANCETS MISC 1 each by Does not apply route 2 times daily Please dispense brand/quantity lancets need for patient's testing device 100 each 3    vitamin D (ERGOCALCIFEROL) 08527 UNITS CAPS capsule Take 50,000 Units by mouth every 14 days        No current facility-administered medications for this visit. Review of Systems  Constitutional: No fever, no chills, no diaphoresis, no generalized weakness. HEENT: No blurred vision, No sore throat, no ear pain, no hair loss  Neck: denied any neck swelling, difficulty swallowing,   Cardio-pulmonary: No CP, SOB or palpitation, No orthopnea or PND. No cough or wheezing. GI: No N/V/D, no constipation, No abdominal pain, no melena or hematochezia   : Denied any dysuria, hematuria, flank pain, discharge, or incontinence. Skin: denied any rash, ulcer, Hirsute, or hyperpigmentation. MSK: denied any joint deformity, joint pain/swelling, muscle pain, or back pain. Neuro: no numbness, no tingling, no weakness, _    OBJECTIVE    BP (!) 188/115   Pulse 92   Ht 5' 6\" (1.676 m)   Wt 298 lb (135.2 kg)   BMI 48.10 kg/m²   BP Readings from Last 4 Encounters:   04/20/22 (!) 188/115   02/16/22 122/80   12/15/21 (!) 166/119   10/13/21 (!) 142/100     Wt Readings from Last 6 Encounters:   04/20/22 298 lb (135.2 kg)   02/16/22 (!) 301 lb (136.5 kg)   12/15/21 300 lb (136.1 kg)   10/13/21 (!) 307 lb (139.3 kg)   06/23/21 (!) 310 lb (140.6 kg)   05/19/21 (!) 316 lb (143.3 kg)       Physical examination:  General: awake alert, oriented x3, no abnormal position or movements. HEENT: normocephalic non-traumatic, no exophthalmos   Neck: supple, no LN enlargement, no thyromegaly, no thyroid tenderness, no JVD. Pulm: Clear equal air entry no added sounds, no wheezing or rhonchi    CVS: S1 + S2, no murmur, no heave.  Dorsalis pedis pulse palpable   Abd: soft lax, no tenderness, no organomegaly, audible bowel sounds. Skin: warm, no lesions, no rash.  No callus, no Ulcers, No acanthosis nigricans  Musculoskeletal: No back tenderness, no kyphosis/scoliosis    Neuro: CN intact, Monofilament sensation normal  bilateral , muscle power normal  Psych: normal mood, and affect      Review of Laboratory Data:  I personally reviewed the following lab:  Lab Results   Component Value Date/Time    WBC 8.5 02/02/2022 12:32 PM    RBC 4.36 02/02/2022 12:32 PM    HGB 12.7 02/02/2022 12:32 PM    HCT 39.9 02/02/2022 12:32 PM    MCV 91.5 02/02/2022 12:32 PM    MCH 29.1 02/02/2022 12:32 PM    MCHC 31.8 (L) 02/02/2022 12:32 PM    RDW 12.4 02/02/2022 12:32 PM     02/02/2022 12:32 PM    MPV 9.3 02/02/2022 12:32 PM      Lab Results   Component Value Date/Time     02/02/2022 12:32 PM    K 4.3 02/02/2022 12:32 PM    CO2 22 02/02/2022 12:32 PM    BUN 14 02/02/2022 12:32 PM    CREATININE 0.9 02/02/2022 12:32 PM    CALCIUM 9.9 02/02/2022 12:32 PM    LABGLOM >60 02/02/2022 12:32 PM    GFRAA >60 02/02/2022 12:32 PM      Lab Results   Component Value Date/Time    TSH 2.110 10/24/2017 09:15 AM    T4FREE 1.08 09/17/2014 11:39 AM    E3BFFCZ 7.4 09/17/2014 11:39 AM    FT3 2.2 09/17/2014 11:39 AM    Y2HAVQU 90.57 09/17/2014 11:39 AM    TPOABS 0.4 09/17/2014 11:39 AM     Lab Results   Component Value Date    LABA1C 7.4 02/02/2022    GLUCOSE 88 02/02/2022    GLUCOSE 77 12/07/2010    MALBCR 16.1 10/06/2021    LABMICR 39.5 10/06/2021    LABCREA 245 10/06/2021     Lab Results   Component Value Date    LABA1C 7.4 02/02/2022    LABA1C 8.3 12/15/2021    LABA1C 9.8 10/06/2021     Lab Results   Component Value Date    TRIG 175 02/02/2022    HDL 49 02/02/2022    LDLCALC 117 02/02/2022    CHOL 201 02/02/2022     Lab Results   Component Value Date    VITD25 18 02/20/2020    VITD25 16 11/04/2019       ASSESSMENT & RECOMMENDATIONS   Anabel Gomez, a 64 y.o.-old female seen in for the following issues       Assessment: Diagnosis Orders   1. Vitamin D deficiency     2. Class 3 severe obesity due to excess calories without serious comorbidity with body mass index (BMI) of 45.0 to 49.9 in adult ([facility] [facility]     3. Type 2 diabetes mellitus without complication, with long-term current use of insulin (HCC)  Hemoglobin A1C    blood glucose monitor strips       Plan:     1. Uncontrolled type 2 diabetes mellitus with hyperglycemia (HCC)   · Improved control   · Continue lantus 20 units at night, Metformin Er 750 mg BID, and amaryl 4 mg daily, rybelsus 14 mg daily   · The patient was advised to check blood sugars once daily alternating times fasting and before dinner. Advised to send blood glucose log in 2 weeks  · Discussed with patient A1c and blood sugar goals  · The patient counseled about the complications of uncontrolled diabetes    2. Uncontrolled type 2 diabetes mellitus with microalbuminuria ([facility] [address]   · Along with nephrology       3. Class 3 severe obesity due to excess calories without serious comorbidity with body mass index (BMI) of 45.0 to 49.9 in adult ([facility] [address]    4. Vitamin D deficiency   · Continue vitD supplement      5. Hypertension-            Patient's blood pressure was elevated at today's visit however she does have whitecoat syndrome. She does check blood pressures at home and most blood pressures are less than 140/90. Following with nephrology    I personally reviewed external notes from PCP and other patient's care team providers, and personally interpreted labs associated with the above diagnosis. I also ordered labs to further assess and manage the above addressed medical conditions    Return in about 4 months (around 8/20/2022) for DM type 2, VitD deficiency. The above issues were reviewed with the patient who understood and agreed with the plan. Thank you for allowing us to participate in the care of this patient. Please do not hesitate to contact us with any additional questions.      [place] [address] [place] [address] and Endocrinology   [address] [place] [address]   Phone: [phone]  Fax: [phone]  --------------------------------------------  An electronic signature was used to authenticate this note.  Cuco Haney MD  on 4/20/2022 at 3:01 PM

## 2022-05-02 DIAGNOSIS — E11.22 TYPE 2 DIABETES MELLITUS WITH STAGE 2 CHRONIC KIDNEY DISEASE, WITHOUT LONG-TERM CURRENT USE OF INSULIN (HCC): ICD-10-CM

## 2022-05-02 DIAGNOSIS — N18.2 TYPE 2 DIABETES MELLITUS WITH STAGE 2 CHRONIC KIDNEY DISEASE, WITHOUT LONG-TERM CURRENT USE OF INSULIN (HCC): ICD-10-CM

## 2022-05-02 RX ORDER — METFORMIN HYDROCHLORIDE 750 MG/1
TABLET, EXTENDED RELEASE ORAL
Qty: 60 TABLET | Refills: 6 | Status: SHIPPED
Start: 2022-05-02 | End: 2022-08-24

## 2022-05-04 ENCOUNTER — HOSPITAL ENCOUNTER (OUTPATIENT)
Age: 62
Discharge: HOME | End: 2022-05-04
Payer: MEDICAID

## 2022-05-04 DIAGNOSIS — E11.22 TYPE 2 DIABETES MELLITUS WITH STAGE 2 CHRONIC KIDNEY DISEASE, WITHOUT LONG-TERM CURRENT USE OF INSULIN (HCC): ICD-10-CM

## 2022-05-04 DIAGNOSIS — N18.2 TYPE 2 DIABETES MELLITUS WITH STAGE 2 CHRONIC KIDNEY DISEASE, WITHOUT LONG-TERM CURRENT USE OF INSULIN (HCC): ICD-10-CM

## 2022-05-04 LAB
ANION GAP SERPL CALCULATED.3IONS-SCNC: 16 MMOL/L (ref 7–16)
BUN BLDV-MCNC: 17 MG/DL (ref 6–23)
CALCIUM SERPL-MCNC: 10.1 MG/DL (ref 8.6–10.2)
CHLORIDE BLD-SCNC: 107 MMOL/L (ref 98–107)
CHOLESTEROL, TOTAL: 197 MG/DL (ref 0–199)
CO2: 19 MMOL/L (ref 22–29)
CREAT SERPL-MCNC: 1.1 MG/DL (ref 0.5–1)
CREATININE URINE: 77 MG/DL (ref 29–226)
GFR AFRICAN AMERICAN: >60
GFR NON-AFRICAN AMERICAN: >60 ML/MIN/1.73
GLUCOSE BLD-MCNC: 107 MG/DL (ref 74–99)
HBA1C MFR BLD: 6.8 % (ref 4–5.6)
HCT VFR BLD CALC: 40.2 % (ref 34–48)
HDLC SERPL-MCNC: 46 MG/DL
HEMOGLOBIN: 13.2 G/DL (ref 11.5–15.5)
LDL CHOLESTEROL CALCULATED: 115 MG/DL (ref 0–99)
MCH RBC QN AUTO: 29.6 PG (ref 26–35)
MCHC RBC AUTO-ENTMCNC: 32.8 % (ref 32–34.5)
MCV RBC AUTO: 90.1 FL (ref 80–99.9)
MICROALBUMIN UR-MCNC: <12 MG/L
MICROALBUMIN/CREAT UR-RTO: ABNORMAL (ref 0–30)
PDW BLD-RTO: 12.2 FL (ref 11.5–15)
PLATELET # BLD: 380 E9/L (ref 130–450)
PMV BLD AUTO: 8.9 FL (ref 7–12)
POTASSIUM SERPL-SCNC: 4.4 MMOL/L (ref 3.5–5)
RBC # BLD: 4.46 E12/L (ref 3.5–5.5)
SODIUM BLD-SCNC: 142 MMOL/L (ref 132–146)
TRIGL SERPL-MCNC: 178 MG/DL (ref 0–149)
VLDLC SERPL CALC-MCNC: 36 MG/DL
WBC # BLD: 7.7 E9/L (ref 4.5–11.5)

## 2022-05-04 PROCEDURE — 82044 UR ALBUMIN SEMIQUANTITATIVE: CPT

## 2022-05-04 PROCEDURE — 82570 ASSAY OF URINE CREATININE: CPT

## 2022-05-04 PROCEDURE — 36415 COLL VENOUS BLD VENIPUNCTURE: CPT

## 2022-05-04 PROCEDURE — 83036 HEMOGLOBIN GLYCOSYLATED A1C: CPT

## 2022-05-04 PROCEDURE — 80048 BASIC METABOLIC PNL TOTAL CA: CPT

## 2022-05-04 PROCEDURE — 85027 COMPLETE CBC AUTOMATED: CPT

## 2022-05-04 PROCEDURE — 80061 LIPID PANEL: CPT

## 2022-05-10 DIAGNOSIS — E11.22 TYPE 2 DIABETES MELLITUS WITH STAGE 2 CHRONIC KIDNEY DISEASE, WITHOUT LONG-TERM CURRENT USE OF INSULIN (HCC): ICD-10-CM

## 2022-05-10 DIAGNOSIS — N18.2 TYPE 2 DIABETES MELLITUS WITH STAGE 2 CHRONIC KIDNEY DISEASE, WITHOUT LONG-TERM CURRENT USE OF INSULIN (HCC): ICD-10-CM

## 2022-05-10 RX ORDER — GLIMEPIRIDE 2 MG/1
TABLET ORAL
Qty: 90 TABLET | Refills: 3 | Status: SHIPPED | OUTPATIENT
Start: 2022-05-10

## 2022-06-23 DIAGNOSIS — E11.65 UNCONTROLLED TYPE 2 DIABETES MELLITUS WITH HYPERGLYCEMIA (HCC): ICD-10-CM

## 2022-08-22 ENCOUNTER — APPOINTMENT (OUTPATIENT)
Dept: GENERAL RADIOLOGY | Age: 62
DRG: 720 | End: 2022-08-22
Payer: MEDICAID

## 2022-08-22 ENCOUNTER — ANESTHESIA (OUTPATIENT)
Dept: OPERATING ROOM | Age: 62
DRG: 720 | End: 2022-08-22
Payer: MEDICAID

## 2022-08-22 ENCOUNTER — ANESTHESIA EVENT (OUTPATIENT)
Dept: OPERATING ROOM | Age: 62
DRG: 720 | End: 2022-08-22
Payer: MEDICAID

## 2022-08-22 ENCOUNTER — APPOINTMENT (OUTPATIENT)
Dept: CT IMAGING | Age: 62
DRG: 720 | End: 2022-08-22
Payer: MEDICAID

## 2022-08-22 ENCOUNTER — HOSPITAL ENCOUNTER (INPATIENT)
Age: 62
LOS: 2 days | Discharge: HOME | DRG: 720 | End: 2022-08-24
Attending: STUDENT IN AN ORGANIZED HEALTH CARE EDUCATION/TRAINING PROGRAM | Admitting: INTERNAL MEDICINE
Payer: MEDICAID

## 2022-08-22 DIAGNOSIS — N13.2 HYDRONEPHROSIS CONCURRENT WITH AND DUE TO CALCULI OF KIDNEY AND URETER: ICD-10-CM

## 2022-08-22 DIAGNOSIS — N13.30 HYDROURETERONEPHROSIS: ICD-10-CM

## 2022-08-22 DIAGNOSIS — A41.9 SEPSIS, DUE TO UNSPECIFIED ORGANISM, UNSPECIFIED WHETHER ACUTE ORGAN DYSFUNCTION PRESENT (HCC): Primary | ICD-10-CM

## 2022-08-22 PROBLEM — N20.0 RENAL CALCULI: Status: ACTIVE | Noted: 2022-08-22

## 2022-08-22 PROBLEM — N28.9 ACUTE ON CHRONIC RENAL INSUFFICIENCY: Status: ACTIVE | Noted: 2022-08-22

## 2022-08-22 PROBLEM — N20.0 RENAL CALCULUS, RIGHT: Status: ACTIVE | Noted: 2022-08-22

## 2022-08-22 PROBLEM — N18.9 ACUTE ON CHRONIC RENAL INSUFFICIENCY: Status: ACTIVE | Noted: 2022-08-22

## 2022-08-22 PROBLEM — E87.5 HYPERKALEMIA: Status: ACTIVE | Noted: 2022-08-22

## 2022-08-22 LAB
ALBUMIN SERPL-MCNC: 4.5 G/DL (ref 3.5–5.2)
ALP BLD-CCNC: 59 U/L (ref 35–104)
ALT SERPL-CCNC: 17 U/L (ref 0–32)
ANION GAP SERPL CALCULATED.3IONS-SCNC: 12 MMOL/L (ref 7–16)
AST SERPL-CCNC: 17 U/L (ref 0–31)
BACTERIA: ABNORMAL /HPF
BASOPHILS ABSOLUTE: 0.07 E9/L (ref 0–0.2)
BASOPHILS RELATIVE PERCENT: 0.5 % (ref 0–2)
BILIRUB SERPL-MCNC: 0.7 MG/DL (ref 0–1.2)
BILIRUBIN URINE: NEGATIVE
BLOOD, URINE: ABNORMAL
BUN BLDV-MCNC: 23 MG/DL (ref 6–23)
CALCIUM SERPL-MCNC: 10.7 MG/DL (ref 8.6–10.2)
CHLORIDE BLD-SCNC: 97 MMOL/L (ref 98–107)
CLARITY: ABNORMAL
CO2: 24 MMOL/L (ref 22–29)
COLOR: YELLOW
CREAT SERPL-MCNC: 1.4 MG/DL (ref 0.5–1)
EOSINOPHILS ABSOLUTE: 0.08 E9/L (ref 0.05–0.5)
EOSINOPHILS RELATIVE PERCENT: 0.6 % (ref 0–6)
GFR AFRICAN AMERICAN: 46
GFR NON-AFRICAN AMERICAN: 46 ML/MIN/1.73
GLUCOSE BLD-MCNC: 189 MG/DL (ref 74–99)
GLUCOSE URINE: NEGATIVE MG/DL
HCT VFR BLD CALC: 38.7 % (ref 34–48)
HEMOGLOBIN: 12.9 G/DL (ref 11.5–15.5)
IMMATURE GRANULOCYTES #: 0.06 E9/L
IMMATURE GRANULOCYTES %: 0.4 % (ref 0–5)
KETONES, URINE: ABNORMAL MG/DL
LACTIC ACID, SEPSIS: 3.4 MMOL/L (ref 0.5–1.9)
LACTIC ACID: 1.9 MMOL/L (ref 0.5–2.2)
LEUKOCYTE ESTERASE, URINE: ABNORMAL
LIPASE: 23 U/L (ref 13–60)
LYMPHOCYTES ABSOLUTE: 1.15 E9/L (ref 1.5–4)
LYMPHOCYTES RELATIVE PERCENT: 8 % (ref 20–42)
MCH RBC QN AUTO: 29.5 PG (ref 26–35)
MCHC RBC AUTO-ENTMCNC: 33.3 % (ref 32–34.5)
MCV RBC AUTO: 88.6 FL (ref 80–99.9)
METER GLUCOSE: 159 MG/DL (ref 74–99)
METER GLUCOSE: 164 MG/DL (ref 74–99)
METER GLUCOSE: 194 MG/DL (ref 74–99)
METER GLUCOSE: 240 MG/DL (ref 74–99)
MONOCYTES ABSOLUTE: 0.69 E9/L (ref 0.1–0.95)
MONOCYTES RELATIVE PERCENT: 4.8 % (ref 2–12)
NEUTROPHILS ABSOLUTE: 12.29 E9/L (ref 1.8–7.3)
NEUTROPHILS RELATIVE PERCENT: 85.7 % (ref 43–80)
NITRITE, URINE: NEGATIVE
PDW BLD-RTO: 12.1 FL (ref 11.5–15)
PH UA: 6 (ref 5–9)
PLATELET # BLD: 384 E9/L (ref 130–450)
PMV BLD AUTO: 8.8 FL (ref 7–12)
POTASSIUM REFLEX MAGNESIUM: 5.8 MMOL/L (ref 3.5–5)
PROTEIN UA: ABNORMAL MG/DL
RBC # BLD: 4.37 E12/L (ref 3.5–5.5)
RBC UA: >20 /HPF (ref 0–2)
SODIUM BLD-SCNC: 133 MMOL/L (ref 132–146)
SPECIFIC GRAVITY UA: 1.02 (ref 1–1.03)
TOTAL PROTEIN: 7.9 G/DL (ref 6.4–8.3)
TROPONIN, HIGH SENSITIVITY: 40 NG/L (ref 0–9)
TROPONIN, HIGH SENSITIVITY: 42 NG/L (ref 0–9)
UROBILINOGEN, URINE: 1 E.U./DL
WBC # BLD: 14.3 E9/L (ref 4.5–11.5)
WBC UA: >20 /HPF (ref 0–5)

## 2022-08-22 PROCEDURE — 81001 URINALYSIS AUTO W/SCOPE: CPT

## 2022-08-22 PROCEDURE — 87088 URINE BACTERIA CULTURE: CPT

## 2022-08-22 PROCEDURE — 74420 UROGRAPHY RTRGR +-KUB: CPT

## 2022-08-22 PROCEDURE — 2580000003 HC RX 258: Performed by: NURSE ANESTHETIST, CERTIFIED REGISTERED

## 2022-08-22 PROCEDURE — 82962 GLUCOSE BLOOD TEST: CPT

## 2022-08-22 PROCEDURE — 2580000003 HC RX 258: Performed by: STUDENT IN AN ORGANIZED HEALTH CARE EDUCATION/TRAINING PROGRAM

## 2022-08-22 PROCEDURE — 6360000004 HC RX CONTRAST MEDICATION: Performed by: UROLOGY

## 2022-08-22 PROCEDURE — 83605 ASSAY OF LACTIC ACID: CPT

## 2022-08-22 PROCEDURE — 6370000000 HC RX 637 (ALT 250 FOR IP): Performed by: STUDENT IN AN ORGANIZED HEALTH CARE EDUCATION/TRAINING PROGRAM

## 2022-08-22 PROCEDURE — C2617 STENT, NON-COR, TEM W/O DEL: HCPCS | Performed by: UROLOGY

## 2022-08-22 PROCEDURE — C1758 CATHETER, URETERAL: HCPCS | Performed by: UROLOGY

## 2022-08-22 PROCEDURE — BT1D1ZZ FLUOROSCOPY OF RIGHT KIDNEY, URETER AND BLADDER USING LOW OSMOLAR CONTRAST: ICD-10-PCS | Performed by: UROLOGY

## 2022-08-22 PROCEDURE — 2700000000 HC OXYGEN THERAPY PER DAY

## 2022-08-22 PROCEDURE — 80053 COMPREHEN METABOLIC PANEL: CPT

## 2022-08-22 PROCEDURE — 84484 ASSAY OF TROPONIN QUANT: CPT

## 2022-08-22 PROCEDURE — 6360000002 HC RX W HCPCS: Performed by: NURSE ANESTHETIST, CERTIFIED REGISTERED

## 2022-08-22 PROCEDURE — 2500000003 HC RX 250 WO HCPCS: Performed by: STUDENT IN AN ORGANIZED HEALTH CARE EDUCATION/TRAINING PROGRAM

## 2022-08-22 PROCEDURE — 83690 ASSAY OF LIPASE: CPT

## 2022-08-22 PROCEDURE — 93005 ELECTROCARDIOGRAM TRACING: CPT | Performed by: PHYSICIAN ASSISTANT

## 2022-08-22 PROCEDURE — 3600000003 HC SURGERY LEVEL 3 BASE: Performed by: UROLOGY

## 2022-08-22 PROCEDURE — 6360000002 HC RX W HCPCS

## 2022-08-22 PROCEDURE — 94664 DEMO&/EVAL PT USE INHALER: CPT

## 2022-08-22 PROCEDURE — 85025 COMPLETE CBC W/AUTO DIFF WBC: CPT

## 2022-08-22 PROCEDURE — 93005 ELECTROCARDIOGRAM TRACING: CPT

## 2022-08-22 PROCEDURE — 96361 HYDRATE IV INFUSION ADD-ON: CPT

## 2022-08-22 PROCEDURE — 71045 X-RAY EXAM CHEST 1 VIEW: CPT

## 2022-08-22 PROCEDURE — 87077 CULTURE AEROBIC IDENTIFY: CPT

## 2022-08-22 PROCEDURE — 2060000000 HC ICU INTERMEDIATE R&B

## 2022-08-22 PROCEDURE — 87040 BLOOD CULTURE FOR BACTERIA: CPT

## 2022-08-22 PROCEDURE — 6360000002 HC RX W HCPCS: Performed by: STUDENT IN AN ORGANIZED HEALTH CARE EDUCATION/TRAINING PROGRAM

## 2022-08-22 PROCEDURE — 74176 CT ABD & PELVIS W/O CONTRAST: CPT

## 2022-08-22 PROCEDURE — 7100000001 HC PACU RECOVERY - ADDTL 15 MIN: Performed by: UROLOGY

## 2022-08-22 PROCEDURE — 2709999900 HC NON-CHARGEABLE SUPPLY: Performed by: UROLOGY

## 2022-08-22 PROCEDURE — 36415 COLL VENOUS BLD VENIPUNCTURE: CPT

## 2022-08-22 PROCEDURE — 3600000013 HC SURGERY LEVEL 3 ADDTL 15MIN: Performed by: UROLOGY

## 2022-08-22 PROCEDURE — 96375 TX/PRO/DX INJ NEW DRUG ADDON: CPT

## 2022-08-22 PROCEDURE — 87186 SC STD MICRODIL/AGAR DIL: CPT

## 2022-08-22 PROCEDURE — 2580000003 HC RX 258: Performed by: PHYSICIAN ASSISTANT

## 2022-08-22 PROCEDURE — 96374 THER/PROPH/DIAG INJ IV PUSH: CPT

## 2022-08-22 PROCEDURE — C1769 GUIDE WIRE: HCPCS | Performed by: UROLOGY

## 2022-08-22 PROCEDURE — 99285 EMERGENCY DEPT VISIT HI MDM: CPT

## 2022-08-22 PROCEDURE — 3700000001 HC ADD 15 MINUTES (ANESTHESIA): Performed by: UROLOGY

## 2022-08-22 PROCEDURE — 7100000000 HC PACU RECOVERY - FIRST 15 MIN: Performed by: UROLOGY

## 2022-08-22 PROCEDURE — 0T768DZ DILATION OF RIGHT URETER WITH INTRALUMINAL DEVICE, VIA NATURAL OR ARTIFICIAL OPENING ENDOSCOPIC: ICD-10-PCS | Performed by: UROLOGY

## 2022-08-22 PROCEDURE — 2500000003 HC RX 250 WO HCPCS: Performed by: NURSE ANESTHETIST, CERTIFIED REGISTERED

## 2022-08-22 PROCEDURE — 3700000000 HC ANESTHESIA ATTENDED CARE: Performed by: UROLOGY

## 2022-08-22 DEVICE — URETERAL STENT
Type: IMPLANTABLE DEVICE | Site: URETER | Status: FUNCTIONAL
Brand: POLARIS™ ULTRA

## 2022-08-22 RX ORDER — DEXTROSE MONOHYDRATE 100 MG/ML
INJECTION, SOLUTION INTRAVENOUS CONTINUOUS PRN
Status: DISCONTINUED | OUTPATIENT
Start: 2022-08-22 | End: 2022-08-24 | Stop reason: HOSPADM

## 2022-08-22 RX ORDER — SODIUM CHLORIDE 9 MG/ML
INJECTION, SOLUTION INTRAVENOUS CONTINUOUS PRN
Status: DISCONTINUED | OUTPATIENT
Start: 2022-08-22 | End: 2022-08-22 | Stop reason: SDUPTHER

## 2022-08-22 RX ORDER — ROCURONIUM BROMIDE 10 MG/ML
INJECTION, SOLUTION INTRAVENOUS PRN
Status: DISCONTINUED | OUTPATIENT
Start: 2022-08-22 | End: 2022-08-22 | Stop reason: SDUPTHER

## 2022-08-22 RX ORDER — FENTANYL CITRATE 50 UG/ML
50 INJECTION, SOLUTION INTRAMUSCULAR; INTRAVENOUS ONCE
Status: COMPLETED | OUTPATIENT
Start: 2022-08-22 | End: 2022-08-22

## 2022-08-22 RX ORDER — ENOXAPARIN SODIUM 100 MG/ML
30 INJECTION SUBCUTANEOUS 2 TIMES DAILY
Status: DISCONTINUED | OUTPATIENT
Start: 2022-08-22 | End: 2022-08-24 | Stop reason: HOSPADM

## 2022-08-22 RX ORDER — LIDOCAINE HYDROCHLORIDE 20 MG/ML
INJECTION, SOLUTION EPIDURAL; INFILTRATION; INTRACAUDAL; PERINEURAL PRN
Status: DISCONTINUED | OUTPATIENT
Start: 2022-08-22 | End: 2022-08-22 | Stop reason: SDUPTHER

## 2022-08-22 RX ORDER — DEXAMETHASONE SODIUM PHOSPHATE 4 MG/ML
INJECTION, SOLUTION INTRA-ARTICULAR; INTRALESIONAL; INTRAMUSCULAR; INTRAVENOUS; SOFT TISSUE PRN
Status: DISCONTINUED | OUTPATIENT
Start: 2022-08-22 | End: 2022-08-22 | Stop reason: SDUPTHER

## 2022-08-22 RX ORDER — 0.9 % SODIUM CHLORIDE 0.9 %
1000 INTRAVENOUS SOLUTION INTRAVENOUS ONCE
Status: COMPLETED | OUTPATIENT
Start: 2022-08-22 | End: 2022-08-22

## 2022-08-22 RX ORDER — ACETAMINOPHEN 650 MG/1
650 SUPPOSITORY RECTAL EVERY 6 HOURS PRN
Status: DISCONTINUED | OUTPATIENT
Start: 2022-08-22 | End: 2022-08-24 | Stop reason: HOSPADM

## 2022-08-22 RX ORDER — PROPOFOL 10 MG/ML
INJECTION, EMULSION INTRAVENOUS PRN
Status: DISCONTINUED | OUTPATIENT
Start: 2022-08-22 | End: 2022-08-22 | Stop reason: SDUPTHER

## 2022-08-22 RX ORDER — INSULIN GLARGINE 100 [IU]/ML
12 INJECTION, SOLUTION SUBCUTANEOUS NIGHTLY
Status: DISCONTINUED | OUTPATIENT
Start: 2022-08-22 | End: 2022-08-24 | Stop reason: HOSPADM

## 2022-08-22 RX ORDER — LEVALBUTEROL 1.25 MG/.5ML
1.25 SOLUTION, CONCENTRATE RESPIRATORY (INHALATION) ONCE
Status: COMPLETED | OUTPATIENT
Start: 2022-08-22 | End: 2022-08-22

## 2022-08-22 RX ORDER — CARVEDILOL 6.25 MG/1
12.5 TABLET ORAL 2 TIMES DAILY WITH MEALS
Status: DISCONTINUED | OUTPATIENT
Start: 2022-08-23 | End: 2022-08-24 | Stop reason: HOSPADM

## 2022-08-22 RX ORDER — PROMETHAZINE HYDROCHLORIDE 25 MG/1
12.5 TABLET ORAL EVERY 6 HOURS PRN
Status: DISCONTINUED | OUTPATIENT
Start: 2022-08-22 | End: 2022-08-24 | Stop reason: HOSPADM

## 2022-08-22 RX ORDER — ONDANSETRON 2 MG/ML
4 INJECTION INTRAMUSCULAR; INTRAVENOUS EVERY 6 HOURS PRN
Status: DISCONTINUED | OUTPATIENT
Start: 2022-08-22 | End: 2022-08-24 | Stop reason: HOSPADM

## 2022-08-22 RX ORDER — FENTANYL CITRATE 50 UG/ML
INJECTION, SOLUTION INTRAMUSCULAR; INTRAVENOUS PRN
Status: DISCONTINUED | OUTPATIENT
Start: 2022-08-22 | End: 2022-08-22 | Stop reason: SDUPTHER

## 2022-08-22 RX ORDER — SODIUM CHLORIDE 0.9 % (FLUSH) 0.9 %
5-40 SYRINGE (ML) INJECTION PRN
Status: DISCONTINUED | OUTPATIENT
Start: 2022-08-22 | End: 2022-08-24 | Stop reason: HOSPADM

## 2022-08-22 RX ORDER — SODIUM CHLORIDE 9 MG/ML
INJECTION, SOLUTION INTRAVENOUS CONTINUOUS
Status: DISCONTINUED | OUTPATIENT
Start: 2022-08-22 | End: 2022-08-24 | Stop reason: HOSPADM

## 2022-08-22 RX ORDER — INSULIN LISPRO 100 [IU]/ML
0-4 INJECTION, SOLUTION INTRAVENOUS; SUBCUTANEOUS NIGHTLY
Status: DISCONTINUED | OUTPATIENT
Start: 2022-08-22 | End: 2022-08-24 | Stop reason: HOSPADM

## 2022-08-22 RX ORDER — SODIUM CHLORIDE FOR INHALATION 0.9 %
3 VIAL, NEBULIZER (ML) INHALATION ONCE
Status: COMPLETED | OUTPATIENT
Start: 2022-08-22 | End: 2022-08-22

## 2022-08-22 RX ORDER — INSULIN LISPRO 100 [IU]/ML
0-4 INJECTION, SOLUTION INTRAVENOUS; SUBCUTANEOUS
Status: DISCONTINUED | OUTPATIENT
Start: 2022-08-23 | End: 2022-08-24 | Stop reason: HOSPADM

## 2022-08-22 RX ORDER — ACETAMINOPHEN 325 MG/1
650 TABLET ORAL EVERY 6 HOURS PRN
Status: DISCONTINUED | OUTPATIENT
Start: 2022-08-22 | End: 2022-08-24 | Stop reason: HOSPADM

## 2022-08-22 RX ORDER — POLYETHYLENE GLYCOL 3350 17 G/17G
17 POWDER, FOR SOLUTION ORAL DAILY PRN
Status: DISCONTINUED | OUTPATIENT
Start: 2022-08-22 | End: 2022-08-24 | Stop reason: HOSPADM

## 2022-08-22 RX ORDER — KETOROLAC TROMETHAMINE 30 MG/ML
15 INJECTION, SOLUTION INTRAMUSCULAR; INTRAVENOUS ONCE
Status: DISCONTINUED | OUTPATIENT
Start: 2022-08-22 | End: 2022-08-22

## 2022-08-22 RX ORDER — SODIUM CHLORIDE 9 MG/ML
25 INJECTION, SOLUTION INTRAVENOUS PRN
Status: DISCONTINUED | OUTPATIENT
Start: 2022-08-22 | End: 2022-08-24 | Stop reason: HOSPADM

## 2022-08-22 RX ORDER — CALCIUM GLUCONATE 94 MG/ML
1000 INJECTION, SOLUTION INTRAVENOUS ONCE
Status: COMPLETED | OUTPATIENT
Start: 2022-08-22 | End: 2022-08-22

## 2022-08-22 RX ORDER — MORPHINE SULFATE 2 MG/ML
2 INJECTION, SOLUTION INTRAMUSCULAR; INTRAVENOUS EVERY 4 HOURS PRN
Status: DISCONTINUED | OUTPATIENT
Start: 2022-08-22 | End: 2022-08-24 | Stop reason: HOSPADM

## 2022-08-22 RX ORDER — ONDANSETRON 2 MG/ML
INJECTION INTRAMUSCULAR; INTRAVENOUS PRN
Status: DISCONTINUED | OUTPATIENT
Start: 2022-08-22 | End: 2022-08-22 | Stop reason: SDUPTHER

## 2022-08-22 RX ORDER — ACETAMINOPHEN 500 MG
1000 TABLET ORAL ONCE
Status: COMPLETED | OUTPATIENT
Start: 2022-08-22 | End: 2022-08-22

## 2022-08-22 RX ORDER — SODIUM CHLORIDE 0.9 % (FLUSH) 0.9 %
5-40 SYRINGE (ML) INJECTION EVERY 12 HOURS SCHEDULED
Status: DISCONTINUED | OUTPATIENT
Start: 2022-08-22 | End: 2022-08-24 | Stop reason: HOSPADM

## 2022-08-22 RX ADMIN — DEXAMETHASONE SODIUM PHOSPHATE 10 MG: 4 INJECTION, SOLUTION INTRAMUSCULAR; INTRAVENOUS at 20:55

## 2022-08-22 RX ADMIN — ACETAMINOPHEN 1000 MG: 500 TABLET ORAL at 19:02

## 2022-08-22 RX ADMIN — ISODIUM CHLORIDE 3 ML: 0.03 SOLUTION RESPIRATORY (INHALATION) at 19:36

## 2022-08-22 RX ADMIN — SODIUM CHLORIDE 1000 ML: 9 INJECTION, SOLUTION INTRAVENOUS at 17:50

## 2022-08-22 RX ADMIN — PROPOFOL 200 MG: 10 INJECTION, EMULSION INTRAVENOUS at 20:48

## 2022-08-22 RX ADMIN — FENTANYL CITRATE 50 MCG: 50 INJECTION, SOLUTION INTRAMUSCULAR; INTRAVENOUS at 17:16

## 2022-08-22 RX ADMIN — CALCIUM GLUCONATE 1000 MG: 98 INJECTION, SOLUTION INTRAVENOUS at 19:54

## 2022-08-22 RX ADMIN — ROCURONIUM BROMIDE 30 MG: 10 INJECTION, SOLUTION INTRAVENOUS at 20:48

## 2022-08-22 RX ADMIN — FENTANYL CITRATE 100 MCG: 50 INJECTION, SOLUTION INTRAMUSCULAR; INTRAVENOUS at 20:48

## 2022-08-22 RX ADMIN — DEXTROSE MONOHYDRATE 250 ML: 100 INJECTION, SOLUTION INTRAVENOUS at 19:58

## 2022-08-22 RX ADMIN — LEVALBUTEROL 1.25 MG: 1.25 SOLUTION, CONCENTRATE RESPIRATORY (INHALATION) at 19:36

## 2022-08-22 RX ADMIN — ONDANSETRON 4 MG: 2 INJECTION INTRAMUSCULAR; INTRAVENOUS at 20:55

## 2022-08-22 RX ADMIN — SODIUM BICARBONATE 50 MEQ: 84 INJECTION, SOLUTION INTRAVENOUS at 19:54

## 2022-08-22 RX ADMIN — SUGAMMADEX 500 MG: 100 INJECTION, SOLUTION INTRAVENOUS at 20:59

## 2022-08-22 RX ADMIN — SODIUM CHLORIDE: 9 INJECTION, SOLUTION INTRAVENOUS at 20:42

## 2022-08-22 RX ADMIN — CEFTRIAXONE 1000 MG: 1 INJECTION, POWDER, FOR SOLUTION INTRAMUSCULAR; INTRAVENOUS at 19:06

## 2022-08-22 RX ADMIN — SODIUM CHLORIDE: 9 INJECTION, SOLUTION INTRAVENOUS at 22:41

## 2022-08-22 RX ADMIN — LIDOCAINE HYDROCHLORIDE 100 MG: 20 INJECTION, SOLUTION EPIDURAL; INFILTRATION; INTRACAUDAL; PERINEURAL at 20:48

## 2022-08-22 RX ADMIN — INSULIN HUMAN 10 UNITS: 100 INJECTION, SOLUTION PARENTERAL at 19:54

## 2022-08-22 RX ADMIN — KETOROLAC TROMETHAMINE 15 MG: 30 INJECTION, SOLUTION INTRAMUSCULAR; INTRAVENOUS at 15:27

## 2022-08-22 ASSESSMENT — PAIN DESCRIPTION - DESCRIPTORS
DESCRIPTORS: DISCOMFORT
DESCRIPTORS: ACHING
DESCRIPTORS: CRAMPING
DESCRIPTORS: CRAMPING
DESCRIPTORS: ACHING;CRAMPING

## 2022-08-22 ASSESSMENT — PAIN DESCRIPTION - PAIN TYPE: TYPE: SURGICAL PAIN

## 2022-08-22 ASSESSMENT — PAIN SCALES - GENERAL
PAINLEVEL_OUTOF10: 10
PAINLEVEL_OUTOF10: 8
PAINLEVEL_OUTOF10: 3
PAINLEVEL_OUTOF10: 4
PAINLEVEL_OUTOF10: 4
PAINLEVEL_OUTOF10: 10
PAINLEVEL_OUTOF10: 10
PAINLEVEL_OUTOF10: 3
PAINLEVEL_OUTOF10: 0

## 2022-08-22 ASSESSMENT — ENCOUNTER SYMPTOMS
NAUSEA: 0
BLOOD IN STOOL: 0
COUGH: 0
SHORTNESS OF BREATH: 0
DIARRHEA: 0
CONSTIPATION: 0
VOMITING: 0
ABDOMINAL PAIN: 0
CHEST TIGHTNESS: 0

## 2022-08-22 ASSESSMENT — PAIN DESCRIPTION - LOCATION
LOCATION: ABDOMEN;BACK;FLANK
LOCATION: FLANK
LOCATION: FLANK
LOCATION: BACK;ABDOMEN;FLANK
LOCATION: ABDOMEN;BACK
LOCATION: FLANK
LOCATION: FLANK

## 2022-08-22 ASSESSMENT — LIFESTYLE VARIABLES: HOW OFTEN DO YOU HAVE A DRINK CONTAINING ALCOHOL: NEVER

## 2022-08-22 ASSESSMENT — PAIN - FUNCTIONAL ASSESSMENT
PAIN_FUNCTIONAL_ASSESSMENT: 0-10
PAIN_FUNCTIONAL_ASSESSMENT: 0-10

## 2022-08-22 ASSESSMENT — PAIN DESCRIPTION - ORIENTATION
ORIENTATION: RIGHT

## 2022-08-22 NOTE — ED NOTES
Department of Emergency Medicine  FIRST PROVIDER TRIAGE NOTE             Independent MLP           8/22/22  2:26 PM EDT    Date of Encounter: 8/22/22   MRN: [id number]      HPI: Camacho Minor is a 64 y.o. female who presents to the ED for Flank Pain (Lt side radiating to LLQ of abdomen) and Emesis  Patient states that she woke up this morning with quadrant of her abdomen. She complains of nausea and vomiting. Denies fevers or chills. She denies any urinary symptoms. ROS: Negative for cp or sob. PE: Gen Appearance/Constitutional: alert  CV: regular rate  Pulm: CTA bilat  GI: tender to palpation to the right lower quadrant and right flank     Initial Plan of Care: All treatment areas with department are currently occupied. Plan to order/Initiate the following while awaiting opening in ED: labs.   Initiate Treatment-Testing, Proceed toTreatment Area When Bed Available for ED Attending/MLP to Continue Care    Electronically signed by CARLEY Naranjo CNP   DD: 8/22/22       CARLEY Naranjo CNP  08/22/22 [id number]

## 2022-08-22 NOTE — ED PROVIDER NOTES
ROMI Saleh is a 17-year-old female with past medical history for congestive heart failure, chronic kidney disease, diabetes, hypertension who is presenting to the emergency department today with chief complaint of right flank pain and emesis. She states that the symptoms started at 9 AM this morning and came on quickly. She states that the pain is sharp and starts in the right flank and radiates around to her right lower quadrant. She does mention that she had several episodes of nausea and vomiting this morning and at that the pain has not improved since that time. This pain is worsened with movement. Patient denies any shortness of breath, chest pain, fever, chills, hematuria or dysuria. Review of Systems   Constitutional:  Negative for chills, fatigue and fever. Respiratory:  Negative for cough, chest tightness and shortness of breath. Cardiovascular:  Negative for chest pain and leg swelling. Gastrointestinal:  Negative for abdominal pain, blood in stool, constipation, diarrhea, nausea and vomiting. Endocrine: Negative for polydipsia and polyphagia. Genitourinary:  Positive for flank pain (right, sharp). Negative for decreased urine volume, difficulty urinating, dysuria, frequency, hematuria and urgency. Skin:  Negative for rash and wound. Neurological:  Negative for syncope, weakness, light-headedness, numbness and headaches. Psychiatric/Behavioral:  Negative for agitation and confusion. Physical Exam  Constitutional:       General: She is in acute distress (rocking back and forth on edge of bed, given 15mg Toradol IV at this time). Appearance: Normal appearance. She is not ill-appearing, toxic-appearing or diaphoretic. HENT:      Head: Normocephalic and atraumatic. Eyes:      Pupils: Pupils are equal, round, and reactive to light. Cardiovascular:      Rate and Rhythm: Normal rate and regular rhythm. Pulses: Normal pulses.       Heart sounds: Normal heart sounds. No murmur heard. No friction rub. No gallop. Pulmonary:      Effort: Pulmonary effort is normal. No respiratory distress. Breath sounds: Normal breath sounds. No stridor. No wheezing, rhonchi or rales. Abdominal:      General: Bowel sounds are normal. There is no distension. Palpations: Abdomen is soft. Tenderness: There is no abdominal tenderness. There is right CVA tenderness. There is no left CVA tenderness, guarding or rebound. Musculoskeletal:      Cervical back: No rigidity or tenderness. Right lower leg: No edema. Left lower leg: No edema. Skin:     General: Skin is warm and dry. Capillary Refill: Capillary refill takes less than 2 seconds. Findings: No bruising, erythema, lesion or rash. Neurological:      General: No focal deficit present. Mental Status: She is alert and oriented to person, place, and time. Psychiatric:         Mood and Affect: Mood normal.         Behavior: Behavior normal.      EKG: This EKG is signed and interpreted by me. Rate: 102  Rhythm: Sinus  Interpretation: new left bundle branch block  Comparison: changes compared to previous EKG    Procedures     MDM  59-year-old female presents emergency department with cute onset right flank pain with nausea and vomiting. Laboratory studies for CBC, CMP, lipase, lactic acid, urinalysis were ordered at this time. Patient given 15 mg IV Toradol for her pain. Toradol did not provide her with significant relief and she was then given 50 mcg of fentanyl which she stated made her feel much better.   Patient's laboratory results were significant for a potassium of 5.8 which was left untreated due to no significant EKG changes and patient being hemodynamically stable, creatinine of 1.4 which was an increase from this patient's baseline of 1.1, lactic acid critical lab for 3.4, and initial troponin of 40 with a repeat of 42, WBC of 14.3, and large amounts of blood in the urine ED Course User Index  [RW] Rosangela Mukherjee, DO       --------------------------------------------- PAST HISTORY ---------------------------------------------  Past Medical History:  has a past medical history of CHF (congestive heart failure) ([facility] [address] Chronic kidney disease, Diabetes mellitus ([facility] Hypertension, and Hypoparathyroidism ([facility] Past Surgical History:  has a past surgical history that includes Tonsillectomy; ovarian cyst removal (12/05/08); Total abdominal hysterectomy w/ bilateral salpingoophorectomy; Breast biopsy (Left, 04/02/2013); and Bladder surgery (Right, 8/22/2022). Social History:  reports that she has never smoked. She has never used smokeless tobacco. She reports that she does not drink alcohol and does not use drugs. Family History: family history includes Cancer in her maternal aunt; Lung Cancer in her maternal uncle. The patients home medications have been reviewed.     Allergies: Trulicity [dulaglutide]    -------------------------------------------------- RESULTS -------------------------------------------------    LABS:  Results for orders placed or performed during the hospital encounter of 08/22/22   CBC with Auto Differential   Result Value Ref Range    WBC 14.3 (H) 4.5 - 11.5 E9/L    RBC 4.37 3.50 - 5.50 E12/L    Hemoglobin 12.9 11.5 - 15.5 g/dL    Hematocrit 38.7 34.0 - 48.0 %    MCV 88.6 80.0 - 99.9 fL    MCH 29.5 26.0 - 35.0 pg    MCHC 33.3 32.0 - 34.5 %    RDW 12.1 11.5 - 15.0 fL    Platelets 519 962 - 687 E9/L    MPV 8.8 7.0 - 12.0 fL    Neutrophils % 85.7 (H) 43.0 - 80.0 %    Immature Granulocytes % 0.4 0.0 - 5.0 %    Lymphocytes % 8.0 (L) 20.0 - 42.0 %    Monocytes % 4.8 2.0 - 12.0 %    Eosinophils % 0.6 0.0 - 6.0 %    Basophils % 0.5 0.0 - 2.0 %    Neutrophils Absolute 12.29 (H) 1.80 - 7.30 E9/L    Immature Granulocytes # 0.06 E9/L    Lymphocytes Absolute 1.15 (L) 1.50 - 4.00 E9/L    Monocytes Absolute 0.69 0.10 - 0.95 E9/L    Eosinophils Absolute 0.08 0.05 - 0.50 E9/L    Basophils Absolute 0.07 0.00 - 0.20 E9/L   Comprehensive Metabolic Panel w/ Reflex to MG   Result Value Ref Range    Sodium 133 132 - 146 mmol/L    Potassium reflex Magnesium 5.8 (H) 3.5 - 5.0 mmol/L    Chloride 97 (L) 98 - 107 mmol/L    CO2 24 22 - 29 mmol/L    Anion Gap 12 7 - 16 mmol/L    Glucose 189 (H) 74 - 99 mg/dL    BUN 23 6 - 23 mg/dL    Creatinine 1.4 (H) 0.5 - 1.0 mg/dL    GFR Non-African American 46 >=60 mL/min/1.73    GFR African American 46     Calcium 10.7 (H) 8.6 - 10.2 mg/dL    Total Protein 7.9 6.4 - 8.3 g/dL    Albumin 4.5 3.5 - 5.2 g/dL    Total Bilirubin 0.7 0.0 - 1.2 mg/dL    Alkaline Phosphatase 59 35 - 104 U/L    ALT 17 0 - 32 U/L    AST 17 0 - 31 U/L   Lactic Acid   Result Value Ref Range    Lactic Acid 1.9 0.5 - 2.2 mmol/L   Urinalysis with Microscopic   Result Value Ref Range    Color, UA Yellow Straw/Yellow    Clarity, UA SL CLOUDY Clear    Glucose, Ur Negative Negative mg/dL    Bilirubin Urine Negative Negative    Ketones, Urine TRACE (A) Negative mg/dL    Specific Gravity, UA 1.020 1.005 - 1.030    Blood, Urine LARGE (A) Negative    pH, UA 6.0 5.0 - 9.0    Protein, UA TRACE Negative mg/dL    Urobilinogen, Urine 1.0 <2.0 E.U./dL    Nitrite, Urine Negative Negative    Leukocyte Esterase, Urine MODERATE (A) Negative    WBC, UA >20 (A) 0 - 5 /HPF    RBC, UA >20 0 - 2 /HPF    Bacteria, UA MANY (A) None Seen /HPF   Lipase   Result Value Ref Range    Lipase 23 13 - 60 U/L   Troponin   Result Value Ref Range    Troponin, High Sensitivity 40 (H) 0 - 9 ng/L   Troponin   Result Value Ref Range    Troponin, High Sensitivity 42 (H) 0 - 9 ng/L   Lactate, Sepsis   Result Value Ref Range    Lactic Acid, Sepsis 3.4 (H) 0.5 - 1.9 mmol/L   Hemoglobin A1c   Result Value Ref Range    Hemoglobin A1C 6.7 (H) 4.0 - 5.6 %   Comprehensive Metabolic Panel w/ Reflex to MG   Result Value Ref Range    Sodium 134 132 - 146 mmol/L    Potassium reflex Magnesium 5.3 (H) 3.5 - 5.0 mmol/L    Chloride 99 98 - 107 mmol/L    CO2 22 22 - 29 mmol/L    Anion Gap 13 7 - 16 mmol/L    Glucose 256 (H) 74 - 99 mg/dL    BUN 26 (H) 6 - 23 mg/dL    Creatinine 1.7 (H) 0.5 - 1.0 mg/dL    GFR Non-African American 37 >=60 mL/min/1.73    GFR African American 37     Calcium 9.6 8.6 - 10.2 mg/dL    Total Protein 6.7 6.4 - 8.3 g/dL    Albumin 3.8 3.5 - 5.2 g/dL    Total Bilirubin 0.6 0.0 - 1.2 mg/dL    Alkaline Phosphatase 47 35 - 104 U/L    ALT 14 0 - 32 U/L    AST 13 0 - 31 U/L   Lactic acid, plasma   Result Value Ref Range    Lactic Acid 1.2 0.5 - 2.2 mmol/L   CBC auto differential   Result Value Ref Range    WBC 29.0 (H) 4.5 - 11.5 E9/L    RBC 3.70 3.50 - 5.50 E12/L    Hemoglobin 11.1 (L) 11.5 - 15.5 g/dL    Hematocrit 33.6 (L) 34.0 - 48.0 %    MCV 90.8 80.0 - 99.9 fL    MCH 30.0 26.0 - 35.0 pg    MCHC 33.0 32.0 - 34.5 %    RDW 12.3 11.5 - 15.0 fL    Platelets 877 305 - 983 E9/L    MPV 9.2 7.0 - 12.0 fL    Neutrophils % 90.2 (H) 43.0 - 80.0 %    Immature Granulocytes % 3.6 0.0 - 5.0 %    Lymphocytes % 2.0 (L) 20.0 - 42.0 %    Monocytes % 4.0 2.0 - 12.0 %    Eosinophils % 0.0 0.0 - 6.0 %    Basophils % 0.2 0.0 - 2.0 %    Neutrophils Absolute 26.19 (H) 1.80 - 7.30 E9/L    Immature Granulocytes # 1.03 E9/L    Lymphocytes Absolute 0.57 (L) 1.50 - 4.00 E9/L    Monocytes Absolute 1.17 (H) 0.10 - 0.95 E9/L    Eosinophils Absolute 0.00 (L) 0.05 - 0.50 E9/L    Basophils Absolute 0.05 0.00 - 0.20 E9/L    Anisocytosis 1+     Polychromasia 1+    Protime-INR   Result Value Ref Range    Protime 14.5 (H) 9.3 - 12.4 sec    INR 1.3    APTT   Result Value Ref Range    aPTT 32.4 24.5 - 35.1 sec   Troponin   Result Value Ref Range    Troponin, High Sensitivity 48 (H) 0 - 9 ng/L   Basic Metabolic Panel   Result Value Ref Range    Sodium 134 132 - 146 mmol/L    Potassium 4.5 3.5 - 5.0 mmol/L    Chloride 98 98 - 107 mmol/L    CO2 23 22 - 29 mmol/L    Anion Gap 13 7 - 16 mmol/L    Glucose 263 (H) 74 - 99 mg/dL    BUN 27 (H) 6 - 23 mg/dL    Creatinine 1.6 (H) 0.5 - 1.0 mg/dL    GFR Non-African American 40 >=60 mL/min/1.73    GFR African American 40     Calcium 9.6 8.6 - 10.2 mg/dL   POCT Glucose   Result Value Ref Range    Meter Glucose 164 (H) 74 - 99 mg/dL   POCT Glucose   Result Value Ref Range    Meter Glucose 240 (H) 74 - 99 mg/dL   POCT Glucose   Result Value Ref Range    Meter Glucose 159 (H) 74 - 99 mg/dL   POCT Glucose   Result Value Ref Range    Meter Glucose 194 (H) 74 - 99 mg/dL   POCT Glucose   Result Value Ref Range    Meter Glucose 267 (H) 74 - 99 mg/dL   POCT Glucose   Result Value Ref Range    Meter Glucose 297 (H) 74 - 99 mg/dL   POCT Glucose   Result Value Ref Range    Meter Glucose 253 (H) 74 - 99 mg/dL   POCT Glucose   Result Value Ref Range    Meter Glucose 280 (H) 74 - 99 mg/dL   EKG 12 Lead   Result Value Ref Range    Ventricular Rate 0 BPM    Atrial Rate 0 BPM    QRS Duration 0 ms    Q-T Interval 0 ms    QTc Calculation (Bazett) 0 ms    R Axis 0 degrees    T Axis 0 degrees   EKG 12 lead   Result Value Ref Range    Ventricular Rate 102 BPM    Atrial Rate 102 BPM    P-R Interval 162 ms    QRS Duration 156 ms    Q-T Interval 382 ms    QTc Calculation (Bazett) 497 ms    P Axis 14 degrees    R Axis -29 degrees    T Axis 119 degrees       RADIOLOGY:  FL RETROGRADE PYELOGRAM W WO KUB   Final Result   Intraprocedural fluoroscopic spot images as above. See separate procedure   report for more information. XR CHEST PORTABLE   Final Result   No acute process. CT ABDOMEN PELVIS WO CONTRAST Additional Contrast? None   Final Result   1. Mild RIGHT HYDROURETERONEPHROSIS related to a 6 mm calculus in the   proximal right ureter. 2. Apparent soft tissue fullness in the region of the gastric cardia may be   due to redundancy of gastric folds. A MASS LESION CANNOT BE EXCLUDED. Endoscopy is recommended. 3. Hepatomegaly with steatosis.    4. Small hiatal hernia.             ------------------------- NURSING NOTES AND VITALS REVIEWED ---------------------------  Date / Time Roomed:  8/22/2022  2:32 PM  ED Bed Assignment:  [id number]    The nursing notes within the ED encounter and vital signs as below have been reviewed. Patient Vitals for the past 24 hrs:   BP Temp Temp src Pulse Resp SpO2 Height Weight   08/23/22 1957 (!) 142/85 98.5 °F (36.9 °C) Axillary 89 18 97 % -- --   08/23/22 1608 133/69 -- -- 82 -- -- -- --   08/23/22 1359 (!) 145/81 98.1 °F (36.7 °C) Oral 87 16 97 % -- --   08/23/22 0753 (!) 142/94 97.9 °F (36.6 °C) Oral 88 18 98 % -- --   08/23/22 0530 -- -- -- -- -- -- 5' 6.5\" (1.689 m) (!) 308 lb 3.2 oz (139.8 kg)   08/23/22 0438 134/70 97.3 °F (36.3 °C) Oral 95 19 94 % -- --   08/22/22 2215 109/76 98.8 °F (37.1 °C) Oral (!) 120 17 99 % -- --   08/22/22 2144 108/61 98.3 °F (36.8 °C) Temporal (!) 124 16 98 % -- --   08/22/22 2129 119/74 -- -- (!) 126 16 98 % -- --       Oxygen Saturation Interpretation: Normal    ------------------------------------------ PROGRESS NOTES ------------------------------------------  Re-evaluation(s):  Time: 9795 and 1819. Patients symptoms are improving  Repeat physical examination is mildly improved. Time: 1857. Patient's symptoms are worsening and repeat physical examination shows worsening clinical condition. Counseling:  I have spoken with the patient and discussed todays results, in addition to providing specific details for the plan of care and counseling regarding the diagnosis and prognosis. Their questions are answered at this time and they are agreeable with the plan of admission.    --------------------------------- ADDITIONAL PROVIDER NOTES ---------------------------------  Consultations:  Time: 1920. Spoke with Dr. Guicho Alvarado. Discussed case. They will consult the patient and take to OR for stent placement. Time: 1951. Spoke with Anastasia Snellen who is admitting for Dr. Lashonda Degroot. Discussed case. They will admit the patient.   This patient's ED course included: a personal history and physicial examination, re-evaluation prior to disposition, multiple bedside re-evaluations, IV medications, cardiac monitoring, continuous pulse oximetry, and complex medical decision making and emergency management    This patient has remained hemodynamically stable during their ED course. Diagnosis:  1. Hydronephrosis concurrent with and due to calculi of kidney and ureter        Disposition:  Patient's disposition: Admit to operating room and then med/surg. Patient's condition is fair.          Colletta Paterson, DO  Resident  08/23/22 1310 No

## 2022-08-23 LAB
ALBUMIN SERPL-MCNC: 3.8 G/DL (ref 3.5–5.2)
ALP BLD-CCNC: 47 U/L (ref 35–104)
ALT SERPL-CCNC: 14 U/L (ref 0–32)
ANION GAP SERPL CALCULATED.3IONS-SCNC: 13 MMOL/L (ref 7–16)
ANION GAP SERPL CALCULATED.3IONS-SCNC: 13 MMOL/L (ref 7–16)
ANISOCYTOSIS: ABNORMAL
APTT: 32.4 SEC (ref 24.5–35.1)
AST SERPL-CCNC: 13 U/L (ref 0–31)
BASOPHILS ABSOLUTE: 0.05 E9/L (ref 0–0.2)
BASOPHILS RELATIVE PERCENT: 0.2 % (ref 0–2)
BILIRUB SERPL-MCNC: 0.6 MG/DL (ref 0–1.2)
BUN BLDV-MCNC: 26 MG/DL (ref 6–23)
BUN BLDV-MCNC: 27 MG/DL (ref 6–23)
CALCIUM SERPL-MCNC: 9.6 MG/DL (ref 8.6–10.2)
CALCIUM SERPL-MCNC: 9.6 MG/DL (ref 8.6–10.2)
CHLORIDE BLD-SCNC: 98 MMOL/L (ref 98–107)
CHLORIDE BLD-SCNC: 99 MMOL/L (ref 98–107)
CO2: 22 MMOL/L (ref 22–29)
CO2: 23 MMOL/L (ref 22–29)
CREAT SERPL-MCNC: 1.6 MG/DL (ref 0.5–1)
CREAT SERPL-MCNC: 1.7 MG/DL (ref 0.5–1)
EKG ATRIAL RATE: 0 BPM
EKG ATRIAL RATE: 102 BPM
EKG P AXIS: 14 DEGREES
EKG P-R INTERVAL: 162 MS
EKG Q-T INTERVAL: 0 MS
EKG Q-T INTERVAL: 382 MS
EKG QRS DURATION: 0 MS
EKG QRS DURATION: 156 MS
EKG QTC CALCULATION (BAZETT): 0 MS
EKG QTC CALCULATION (BAZETT): 497 MS
EKG R AXIS: -29 DEGREES
EKG R AXIS: 0 DEGREES
EKG T AXIS: 0 DEGREES
EKG T AXIS: 119 DEGREES
EKG VENTRICULAR RATE: 0 BPM
EKG VENTRICULAR RATE: 102 BPM
EOSINOPHILS ABSOLUTE: 0 E9/L (ref 0.05–0.5)
EOSINOPHILS RELATIVE PERCENT: 0 % (ref 0–6)
GFR AFRICAN AMERICAN: 37
GFR AFRICAN AMERICAN: 40
GFR NON-AFRICAN AMERICAN: 37 ML/MIN/1.73
GFR NON-AFRICAN AMERICAN: 40 ML/MIN/1.73
GLUCOSE BLD-MCNC: 256 MG/DL (ref 74–99)
GLUCOSE BLD-MCNC: 263 MG/DL (ref 74–99)
HBA1C MFR BLD: 6.7 % (ref 4–5.6)
HCT VFR BLD CALC: 33.6 % (ref 34–48)
HEMOGLOBIN: 11.1 G/DL (ref 11.5–15.5)
IMMATURE GRANULOCYTES #: 1.03 E9/L
IMMATURE GRANULOCYTES %: 3.6 % (ref 0–5)
INR BLD: 1.3
LACTIC ACID: 1.2 MMOL/L (ref 0.5–2.2)
LYMPHOCYTES ABSOLUTE: 0.57 E9/L (ref 1.5–4)
LYMPHOCYTES RELATIVE PERCENT: 2 % (ref 20–42)
MCH RBC QN AUTO: 30 PG (ref 26–35)
MCHC RBC AUTO-ENTMCNC: 33 % (ref 32–34.5)
MCV RBC AUTO: 90.8 FL (ref 80–99.9)
METER GLUCOSE: 253 MG/DL (ref 74–99)
METER GLUCOSE: 267 MG/DL (ref 74–99)
METER GLUCOSE: 280 MG/DL (ref 74–99)
METER GLUCOSE: 297 MG/DL (ref 74–99)
MONOCYTES ABSOLUTE: 1.17 E9/L (ref 0.1–0.95)
MONOCYTES RELATIVE PERCENT: 4 % (ref 2–12)
NEUTROPHILS ABSOLUTE: 26.19 E9/L (ref 1.8–7.3)
NEUTROPHILS RELATIVE PERCENT: 90.2 % (ref 43–80)
PDW BLD-RTO: 12.3 FL (ref 11.5–15)
PLATELET # BLD: 295 E9/L (ref 130–450)
PMV BLD AUTO: 9.2 FL (ref 7–12)
POLYCHROMASIA: ABNORMAL
POTASSIUM REFLEX MAGNESIUM: 5.3 MMOL/L (ref 3.5–5)
POTASSIUM SERPL-SCNC: 4.5 MMOL/L (ref 3.5–5)
PROTHROMBIN TIME: 14.5 SEC (ref 9.3–12.4)
RBC # BLD: 3.7 E12/L (ref 3.5–5.5)
SODIUM BLD-SCNC: 134 MMOL/L (ref 132–146)
SODIUM BLD-SCNC: 134 MMOL/L (ref 132–146)
TOTAL PROTEIN: 6.7 G/DL (ref 6.4–8.3)
TROPONIN, HIGH SENSITIVITY: 48 NG/L (ref 0–9)
WBC # BLD: 29 E9/L (ref 4.5–11.5)

## 2022-08-23 PROCEDURE — 2580000003 HC RX 258: Performed by: INTERNAL MEDICINE

## 2022-08-23 PROCEDURE — 36415 COLL VENOUS BLD VENIPUNCTURE: CPT

## 2022-08-23 PROCEDURE — 2700000000 HC OXYGEN THERAPY PER DAY

## 2022-08-23 PROCEDURE — 6370000000 HC RX 637 (ALT 250 FOR IP): Performed by: PHYSICIAN ASSISTANT

## 2022-08-23 PROCEDURE — 87077 CULTURE AEROBIC IDENTIFY: CPT

## 2022-08-23 PROCEDURE — 82962 GLUCOSE BLOOD TEST: CPT

## 2022-08-23 PROCEDURE — 6360000002 HC RX W HCPCS: Performed by: INTERNAL MEDICINE

## 2022-08-23 PROCEDURE — 87088 URINE BACTERIA CULTURE: CPT

## 2022-08-23 PROCEDURE — 83605 ASSAY OF LACTIC ACID: CPT

## 2022-08-23 PROCEDURE — 83036 HEMOGLOBIN GLYCOSYLATED A1C: CPT

## 2022-08-23 PROCEDURE — 6370000000 HC RX 637 (ALT 250 FOR IP): Performed by: INTERNAL MEDICINE

## 2022-08-23 PROCEDURE — 2580000003 HC RX 258: Performed by: PHYSICIAN ASSISTANT

## 2022-08-23 PROCEDURE — 84484 ASSAY OF TROPONIN QUANT: CPT

## 2022-08-23 PROCEDURE — 85025 COMPLETE CBC W/AUTO DIFF WBC: CPT

## 2022-08-23 PROCEDURE — 87186 SC STD MICRODIL/AGAR DIL: CPT

## 2022-08-23 PROCEDURE — 80048 BASIC METABOLIC PNL TOTAL CA: CPT

## 2022-08-23 PROCEDURE — 2060000000 HC ICU INTERMEDIATE R&B

## 2022-08-23 PROCEDURE — 80053 COMPREHEN METABOLIC PANEL: CPT

## 2022-08-23 PROCEDURE — 85610 PROTHROMBIN TIME: CPT

## 2022-08-23 PROCEDURE — 85730 THROMBOPLASTIN TIME PARTIAL: CPT

## 2022-08-23 PROCEDURE — 6360000002 HC RX W HCPCS: Performed by: PHYSICIAN ASSISTANT

## 2022-08-23 RX ORDER — SODIUM POLYSTYRENE SULFONATE 15 G/60ML
15 SUSPENSION ORAL; RECTAL ONCE
Status: COMPLETED | OUTPATIENT
Start: 2022-08-23 | End: 2022-08-23

## 2022-08-23 RX ORDER — KETOROLAC TROMETHAMINE 30 MG/ML
15 INJECTION, SOLUTION INTRAMUSCULAR; INTRAVENOUS EVERY 6 HOURS PRN
Status: DISCONTINUED | OUTPATIENT
Start: 2022-08-23 | End: 2022-08-24 | Stop reason: HOSPADM

## 2022-08-23 RX ADMIN — SODIUM CHLORIDE: 9 INJECTION, SOLUTION INTRAVENOUS at 11:07

## 2022-08-23 RX ADMIN — SODIUM CHLORIDE: 9 INJECTION, SOLUTION INTRAVENOUS at 22:26

## 2022-08-23 RX ADMIN — INSULIN LISPRO 2 UNITS: 100 INJECTION, SOLUTION INTRAVENOUS; SUBCUTANEOUS at 16:05

## 2022-08-23 RX ADMIN — CARVEDILOL 12.5 MG: 6.25 TABLET, FILM COATED ORAL at 16:08

## 2022-08-23 RX ADMIN — KETOROLAC TROMETHAMINE 15 MG: 30 INJECTION, SOLUTION INTRAMUSCULAR at 14:42

## 2022-08-23 RX ADMIN — INSULIN LISPRO 2 UNITS: 100 INJECTION, SOLUTION INTRAVENOUS; SUBCUTANEOUS at 06:35

## 2022-08-23 RX ADMIN — SODIUM POLYSTYRENE SULFONATE 15 G: 15 SUSPENSION ORAL; RECTAL at 09:44

## 2022-08-23 RX ADMIN — SODIUM CHLORIDE, PRESERVATIVE FREE 10 ML: 5 INJECTION INTRAVENOUS at 20:00

## 2022-08-23 RX ADMIN — CARVEDILOL 12.5 MG: 6.25 TABLET, FILM COATED ORAL at 08:05

## 2022-08-23 RX ADMIN — INSULIN LISPRO 2 UNITS: 100 INJECTION, SOLUTION INTRAVENOUS; SUBCUTANEOUS at 11:04

## 2022-08-23 RX ADMIN — CEFTRIAXONE 1000 MG: 1 INJECTION, POWDER, FOR SOLUTION INTRAMUSCULAR; INTRAVENOUS at 18:30

## 2022-08-23 RX ADMIN — INSULIN GLARGINE 12 UNITS: 100 INJECTION, SOLUTION SUBCUTANEOUS at 20:01

## 2022-08-23 ASSESSMENT — PAIN DESCRIPTION - ORIENTATION
ORIENTATION: RIGHT
ORIENTATION: RIGHT

## 2022-08-23 ASSESSMENT — PAIN DESCRIPTION - ONSET: ONSET: ON-GOING

## 2022-08-23 ASSESSMENT — PAIN DESCRIPTION - DESCRIPTORS
DESCRIPTORS: ACHING;DISCOMFORT
DESCRIPTORS: DISCOMFORT;ACHING

## 2022-08-23 ASSESSMENT — PAIN DESCRIPTION - FREQUENCY: FREQUENCY: INTERMITTENT

## 2022-08-23 ASSESSMENT — PAIN DESCRIPTION - PAIN TYPE: TYPE: ACUTE PAIN

## 2022-08-23 ASSESSMENT — PAIN SCALES - GENERAL
PAINLEVEL_OUTOF10: 0
PAINLEVEL_OUTOF10: 0
PAINLEVEL_OUTOF10: 8
PAINLEVEL_OUTOF10: 6
PAINLEVEL_OUTOF10: 0

## 2022-08-23 ASSESSMENT — PAIN - FUNCTIONAL ASSESSMENT
PAIN_FUNCTIONAL_ASSESSMENT: ACTIVITIES ARE NOT PREVENTED
PAIN_FUNCTIONAL_ASSESSMENT: PREVENTS OR INTERFERES SOME ACTIVE ACTIVITIES AND ADLS

## 2022-08-23 ASSESSMENT — PAIN DESCRIPTION - LOCATION
LOCATION: BACK
LOCATION: BACK

## 2022-08-23 NOTE — PROGRESS NOTES
Nursing Transfer Note    Data:  Summary of patients progress: Dr Torres Aw cysto stent  Reason for transfer: next level of care    Action:  Explained reason for transfer to Patient and Family. Report given to: Jia Pino, using RN Handoff Navigator.   Mode of transportation: bed    Response:  RN Recommendations: pain mgt

## 2022-08-23 NOTE — OP NOTE
Operative Note      Patient: Damion Hicks  YOB: 1960  MRN: [id number]    Date of Procedure: 8/22/2022    Pre-Op Diagnosis: Hydronephrosis concurrent with and due to calculi of kidney and ureter [N13.2], sepsis    Post-Op Diagnosis: Same       Procedure(s):  CYSTOSCOPY RETROGRADE PYELOGRAM STENT INSERTION, right    Surgeon(s):  Srikanth Hilario MD    Assistant:   * No surgical staff found *    Anesthesia: Monitor Anesthesia Care    Estimated Blood Loss (mL): Minimal    Complications: None    Specimens:   ID Type Source Tests Collected by Time Destination   1 : right kidney urine culture Urine Urine, Cystoscopic CULTURE, URINE Srikanth Hilario MD 8/22/2022 2057        Implants:  Implant Name Type Inv. Item Serial No.  Lot No. LRB No. Used Action   STENT URET 6FR L26CM PERCFLX HYDR+ DBL PGTL THRD 2 - [id number]  STENT URET 6FR L26CM PERCFLX HYDR+ DBL PGTL THRD 2  [facility]- [id number] Right 1 Implanted         Drains:   Ureteral Drain/Stent 08/22/22 Right Ureter (Active)       Urinary Catheter 08/22/22 Stern (Active)           INDICATION FOR PROCEDURE: Damion Hicks is a 64 y.o. who  was found to have a ureteral stone with hydronephrosis and signs of sepsis including tachycardia, elevated lactate, fever. The patient is to undergo cystoscopy with stent insertion urgently. She understands the risks, benefits, alternatives of the procedure as well as the fact that the stone will not be treated today ,signed informed consent and agrees to proceed. PROCEDURE:   The patient was brought into the operating room and placed under anesthesia in the dorsal lithotomy position. [facility] was prepped and draped in a sterile fashion. A 21-English cystoscope with a 30-degree lens was passed through the urethra and into the bladder. The entire length of the urethra was examined and found to be without strictures or other abnormalities.  The entire bladder mucosal surface was examined under 30-degree endoscopy and found to be without calculi, tumors, diverticula, or other abnormalities. The ureteral orifices were normal in size, number, and location. A 5 Persian open-ended catheter was passed into the right ureter. Contrast dye was injected into the ureter gently and filling defect was seen in the proximal ureter. A wire was passed through the catheter and into the ureter. This was passed beyond the stone and into the renal pelvis under fluoroscopic guidance. An efflux of purulent urine was seen and the decision was made to place a ureteral stent at this time. A 6 x 26 ureteral stent was placed over the wire and into the renal pelvis. Purulent urine was seen coming from the stent and sent for culture. The bladder was drained. The patient was awakened from anesthesia and taken to recovery in stable condition. PLAN:  Brayan Stahl will be treated with antibiotics until the infection has resolved. At a later date and as an outpatient the stone will be managed via with ureteroscopy and laser lithotripsy.     Lory Dumont MD, M.D.  8/22/2022  9:01 PM      Electronically signed by Lory Dumont MD on 8/22/2022 at 9:01 PM

## 2022-08-23 NOTE — PROGRESS NOTES
Physical Therapy  Facility/Department: [facility]  INTERMEDIATE 1      Name: Emma Neville  : 1960  MRN: [id number]  Date of Service: 2022       Order received for PT evaluation, pt reports she is up independently in room. Denies any PT needs at this time.    Katina SAMSON [id number]

## 2022-08-23 NOTE — ANESTHESIA POSTPROCEDURE EVALUATION
Department of Anesthesiology  Postprocedure Note    Patient: Amada Peña  MRN: [id number]  YOB: 1960  Date of evaluation: 8/22/2022      Procedure Summary     Date: 08/22/22 Room / Location: [facility] OR 06 / [facility]    Anesthesia Start: 2042 Anesthesia Stop: 2114    Procedure: CYSTOSCOPY RETROGRADE PYELOGRAM STENT INSERTION (Right: Ureter) Diagnosis:       Hydronephrosis concurrent with and due to calculi of kidney and ureter      (Hydronephrosis concurrent with and due to calculi of kidney and ureter [N13.2])    Surgeons: Rob Pritchard MD Responsible Provider: Mariya Parra DO    Anesthesia Type: Not recorded ASA Status: Not recorded          Anesthesia Type: No value filed.     Shankar Phase I:      Shankar Phase II:        Anesthesia Post Evaluation    Patient location during evaluation: PACU  Patient participation: complete - patient participated  Level of consciousness: awake and alert  Pain score: 3  Airway patency: patent  Nausea & Vomiting: no nausea and no vomiting  Complications: no  Cardiovascular status: hemodynamically stable  Respiratory status: acceptable

## 2022-08-23 NOTE — PROGRESS NOTES
Spoke with Dr. Torrie Robb regarding patients lab results.  Per Dr. Torrie Robb she will review labs

## 2022-08-23 NOTE — ANESTHESIA PRE PROCEDURE
the skin nightly 15 mL 5    carvedilol (COREG) 25 MG tablet Take 25 mg by mouth 2 times daily      doxazosin (CARDURA) 2 MG tablet TAKE ONE TABLET BY MOUTH AT BEDTIME      furosemide (LASIX) 40 MG tablet 1 tablet as needed  3    spironolactone (ALDACTONE) 25 MG tablet TAKE ONE TABLET BY MOUTH DAILY 30 tablet 1    ONE TOUCH ULTRA TEST strip USE 1 TWICE DAILY  3    Blood Glucose Monitoring Suppl (ONE TOUCH ULTRA 2) w/Device KIT use as directed  0    ONE TOUCH LANCETS MISC 1 each by Does not apply route 2 times daily Please dispense brand/quantity lancets need for patient's testing device 100 each 3    vitamin D (ERGOCALCIFEROL) 38095 UNITS CAPS capsule Take 50,000 Units by mouth every 14 days          Allergies:     Allergies   Allergen Reactions    Trulicity [Dulaglutide] Nausea Only and Other (See Comments)     Severe GI issues       Problem List:    Patient Active Problem List   Diagnosis Code    Class 3 severe obesity due to excess calories with serious comorbidity and body mass index (BMI) of 45.0 to 49.9 in adult ([facility]) E66.01, Z68.42    Type 2 diabetes mellitus with stage 2 chronic kidney disease, without long-term current use of insulin ([facility]) E11.22, N18.2    Essential hypertension I10    Hypercalcemia E83.52    Sepsis ([facility] [address] A41.9    Acute on chronic renal insufficiency N28.9, N18.9    Hyperkalemia E87.5    Renal calculus, right N20.0    Renal calculi N20.0       Past Medical History:        Diagnosis Date    CHF (congestive heart failure) ([facility])     Chronic kidney disease     Diabetes mellitus ([address]     Hypertension     Hypoparathyroidism ([facility] [address]        Past Surgical History:        Procedure Laterality Date    BREAST BIOPSY Left 04/02/2013    benign    OVARIAN CYST REMOVAL  12/05/08    left    SUREKHA AND BSO (CERVIX REMOVED)      TONSILLECTOMY         Social History:    Social History     Tobacco Use    Smoking status: Never    Smokeless tobacco: Never   Substance Use Topics    Alcohol use: No                                Counseling given: Not Answered      Vital Signs (Current):   Vitals:    08/22/22 1730 08/22/22 1845 08/22/22 1904 08/22/22 1919   BP: (!) 178/89  (!) 134/102 (!) 155/103   Pulse: (!) 104 (!) 132 (!) 131 (!) 137   Resp: 16 (!) 40 26 (!) 44   Temp:  (!) 100.5 °F (38.1 °C)     TempSrc:  Oral     SpO2: 94% 97% (!) 83% 100%   Weight:       Height:                                                  BP Readings from Last 3 Encounters:   08/22/22 (!) 155/103   06/22/22 (!) 148/90   04/20/22 (!) 188/115       NPO Status:  > 8 hours                                                                               BMI:   Wt Readings from Last 3 Encounters:   08/22/22 297 lb (134.7 kg)   06/22/22 300 lb (136.1 kg)   04/20/22 298 lb (135.2 kg)     Body mass index is 47.22 kg/m². CBC:   Lab Results   Component Value Date/Time    WBC 14.3 08/22/2022 02:57 PM    RBC 4.37 08/22/2022 02:57 PM    HGB 12.9 08/22/2022 02:57 PM    HCT 38.7 08/22/2022 02:57 PM    MCV 88.6 08/22/2022 02:57 PM    RDW 12.1 08/22/2022 02:57 PM     08/22/2022 02:57 PM       CMP:   Lab Results   Component Value Date/Time     08/22/2022 02:57 PM    K 5.8 08/22/2022 02:57 PM    CL 97 08/22/2022 02:57 PM    CO2 24 08/22/2022 02:57 PM    BUN 23 08/22/2022 02:57 PM    CREATININE 1.4 08/22/2022 02:57 PM    GFRAA 46 08/22/2022 02:57 PM    LABGLOM 46 08/22/2022 02:57 PM    GLUCOSE 189 08/22/2022 02:57 PM    GLUCOSE 77 12/07/2010 03:00 PM    PROT 7.9 08/22/2022 02:57 PM    CALCIUM 10.7 08/22/2022 02:57 PM    BILITOT 0.7 08/22/2022 02:57 PM    ALKPHOS 59 08/22/2022 02:57 PM    AST 17 08/22/2022 02:57 PM    ALT 17 08/22/2022 02:57 PM       POC Tests: No results for input(s): POCGLU, POCNA, POCK, POCCL, POCBUN, POCHEMO, POCHCT in the last 72 hours.     Coags: No results found for: PROTIME, INR, APTT    HCG (If Applicable): No results found for: PREGTESTUR, PREGSERUM, HCG, HCGQUANT     ABGs: No results found for: PHART,

## 2022-08-23 NOTE — CONSULTS
[facility], [facility].  [address] [place] [address]. [facility], [address] [facility]  [phone]  FAX [phone]        REASON FOR CONSULTATION:      6 mm, right, proximal ureteral stone with sepsis of urinary origin    HISTORY OF PRESENT ILLNESS:      The patient is a 64 y.o. female patient who presents with acute onset of right-sided flank pain beginning this morning. She has had low-grade fevers. She is tachycardic in the emergency room and had a bout of hypotension. CT scan abdomen pelvis reveals 6 mm right proximal ureteral stone. Past Medical History:   Diagnosis Date    CHF (congestive heart failure) ([facility]     Chronic kidney disease     Diabetes mellitus ([facility]     Hypertension     Hypoparathyroidism ([facility]          Past Surgical History:   Procedure Laterality Date    BREAST BIOPSY Left 04/02/2013    benign    OVARIAN CYST REMOVAL  12/05/08    left    SUREKHA AND BSO (CERVIX REMOVED)      TONSILLECTOMY         Medications Prior to Admission:    Not in a hospital admission. Allergies:    Trulicity [dulaglutide]    Social History:    reports that she has never smoked. She has never used smokeless tobacco. She reports that she does not drink alcohol and does not use drugs. Family History:   Non-contributory to this Urological problem  family history includes Cancer in her maternal aunt; Lung Cancer in her maternal uncle.     REVIEW OF SYSTEMS:  Fever  Respiratory: negative for cough and hemoptysis  Cardiovascular: negative for chest pain and dyspnea, tachycardia  Gastrointestinal: Nausea vomiting  Derm: negative for rash and skin lesion(s)  Neurological: negative for seizures and tremors  Endocrine: negative for diabetic symptoms including polydipsia and polyuria  : As above in the HPI, otherwise negative  All other systems negative    PHYSICAL EXAM:    Vitals:  BP (!) 142/70   Pulse (!) 134   Temp 100.3 °F (37.9 °C) (Oral)   Resp 21   Ht 5' 6.5\" (1.689 m)   Wt 297 lb (134.7 kg)   SpO2 100%   BMI 47.22 kg/m²     General:  Awake, alert, oriented X 3. Well developed, well nourished, well groomed. No apparent distress but uncomfortable. HEENT:  Normocephalic, atraumatic. Pupils equal, round. No scleral icterus. No conjunctival injection. Normal lips, teeth, and gums. No nasal discharge. Neck:  Supple, no masses. Sinus tachycardia  Lungs:  No audible wheezing. Respirations symmetric and non-labored. Abdomen:  soft, nontender, no masses, no organomegaly, no peritoneal signs  Extremities:  No clubbing, cyanosis, or edema  Skin:  Warm and dry, no open lesions or rashes  Neuro:  Cranial nerves 2-12 intact, no focal deficits  Rectal: deferred  Genitalia:  deferred    LABS:    Lab Results   Component Value Date    WBC 14.3 (H) 08/22/2022    HGB 12.9 08/22/2022    HCT 38.7 08/22/2022    MCV 88.6 08/22/2022     08/22/2022       Lab Results   Component Value Date    CREATININE 1.4 (H) 08/22/2022           ASSESSMENT / PLAN:      1.  6 mm, right, proximal ureteral stone with hydronephrosis, acute kidney injury and sepsis of urinary origin. June Casey will be taken stat to the operating room for cystoscopy and stent insertion. She understands that the stone will not be treated at this setting. She will need to undergo treatment with antibiotics and once improved will then undergo definitive stone procedure. The stone measures 1100 Hounsfield units and may be amenable to shockwave lithotripsy in the future.       Lanny Vazquez MD, M.D.  8:21 PM  8/22/2022

## 2022-08-23 NOTE — H&P
Hospital Medicine History & Physical      PCP: Corrina Barr DO    Date of Admission: 8/22/2022    Date of Service: . AUGUST 23, 2022    Chief Complaint:   RIGHT FLANK PAIN      History Of Present Illness:     64 y.o. female presented with RIGHT FLANK PAIN, FOUND TO HAVE A KIDNEY STONE, HAD A STENT PLACED LAST PM. DID NOT HAVE HEMATURIA    Past Medical History:          Diagnosis Date    CHF (congestive heart failure) (HCC)     Chronic kidney disease     Diabetes mellitus ([facility]     Hypertension     Hypoparathyroidism ([facility]        Past Surgical History:          Procedure Laterality Date    BLADDER SURGERY Right 8/22/2022    CYSTOSCOPY RETROGRADE PYELOGRAM STENT INSERTION performed by Damir Landry MD at [facility] Left 04/02/2013    benign    OVARIAN CYST REMOVAL  12/05/08    left    SUREKHA AND BSO (CERVIX REMOVED)      TONSILLECTOMY         Medications Prior to Admission:      Prior to Admission medications    Medication Sig Start Date End Date Taking? Authorizing Provider   Insulin Pen Needle 32G X 4 MM MISC 1 each by Does not apply route daily 6/23/22   CARLEY Jones - NP   glimepiride (AMARYL) 2 MG tablet TAKE ONE TABLET BY MOUTH DAILY WITH BREAKFAST 5/10/22   Mayo Emmanuel MD   metFORMIN (GLUCOPHAGE-XR) 750 MG extended release tablet TAKE ONE TABLET BY MOUTH TWO TIMES A DAY 5/2/22   Mayo Emmanuel MD   blood glucose monitor strips One-Touch Ultra strips.  Check DAILY 4/20/22   Mayo Emmanuel MD   Semaglutide [facility]) 14 MG TABS Take 1 tablet daily 4/12/22   CARLEY Deras - CNS   carvedilol (COREG) 12.5 MG tablet TAKE ONE TABLET BY MOUTH TWICE A DAY WITH MEALS 1/17/22   Historical Provider, MD   SENSIPAR 30 MG tablet TAKE ONE TABLET BY MOUTH ONCE DAILY 1/20/22   Historical Provider, MD   insulin glargine (LANTUS SOLOSTAR) 100 UNIT/ML injection pen Inject 14 Units into the skin nightly 2/16/22   Patrick Rabago,    carvedilol (COREG) 25 MG tablet Take 25 mg by mouth 2 times daily 8/21/20   Historical Provider, MD   doxazosin (CARDURA) 2 MG tablet TAKE ONE TABLET BY MOUTH AT BEDTIME 8/8/20   Historical Provider, MD   spironolactone (ALDACTONE) 25 MG tablet TAKE ONE TABLET BY MOUTH DAILY 3/19/19   Steven Panchal, DO   ONE TOUCH ULTRA TEST strip USE 1 TWICE DAILY 3/17/18   Historical Provider, MD   Blood Glucose Monitoring Suppl (ONE TOUCH ULTRA 2) w/Device KIT use as directed 2/19/18   Historical Provider, MD   ONE TOUCH LANCETS MISC 1 each by Does not apply route 2 times daily Please dispense brand/quantity lancets need for patient's testing device 2/19/18   Patrick Rabago DO   vitamin D (ERGOCALCIFEROL) 72584 UNITS CAPS capsule Take 50,000 Units by mouth every 14 days     Historical Provider, MD       Allergies:  Trulicity [dulaglutide]    Social History:      The patient currently lives  WITH SON    TOBACCO:   reports that she has never smoked. She has never used smokeless tobacco.  ETOH:   reports no history of alcohol use. Family History:       Reviewed in detail and negative for DM, CAD, Cancer, CVA. Positive as follows:        Problem Relation Age of Onset    Lung Cancer Maternal Uncle     Cancer Maternal Aunt         Thymus       REVIEW OF SYSTEMS:   Pertinent positives as noted in the HPI. All other systems reviewed and negative. PHYSICAL EXAM:    /69   Pulse 82   Temp 98.1 °F (36.7 °C) (Oral)   Resp 16   Ht 5' 6.5\" (1.689 m)   Wt (!) 308 lb 3.2 oz (139.8 kg)   SpO2 97%   BMI 49.00 kg/m²     General appearance:  No apparent distress, appears stated age and cooperative. HEENT:  Normal cephalic, atraumatic without obvious deformity. Pupils equal, round, and reactive to light. Extra ocular muscles intact. Conjunctivae/corneas clear. Neck: Supple, with full range of motion. No jugular venous distention.  Trachea midline. Respiratory:  Normal respiratory effort. Clear to auscultation, bilaterally without Rales/Wheezes/Rhonchi. Cardiovascular:  Regular rate and rhythm   Abdomen: Soft, non-tender, non-distended with normal bowel sounds. Musculoskeletal:  No clubbing, cyanosis POS edema bilaterally. POS TENDER RIGHT FLANK AREA (LLOYDS   Skin: Skin color, texture, turgor normal.  No rashes or lesions. Neurologic:  Neurovascularly intact without any focal sensory/motor deficits. Cranial nerves: II-XII intact, grossly non-focal.  Psychiatric:  Alert and oriented, thought content appropriate, normal insight        Labs:     Recent Labs     08/22/22  1457 08/23/22  0645   WBC 14.3* 29.0*   HGB 12.9 11.1*   HCT 38.7 33.6*    295     Recent Labs     08/22/22  1457 08/23/22  0645 08/23/22  1605    134 134   K 5.8* 5.3* 4.5   CL 97* 99 98   CO2 24 22 23   BUN 23 26* 27*   CREATININE 1.4* 1.7* 1.6*   CALCIUM 10.7* 9.6 9.6     Recent Labs     08/22/22  1457 08/23/22  0645   AST 17 13   ALT 17 14   BILITOT 0.7 0.6   ALKPHOS 59 47     Recent Labs     08/23/22  0645   INR 1.3     No results for input(s): Ann Nguyen in the last 72 hours. Urinalysis:      Lab Results   Component Value Date/Time    NITRU Negative 08/22/2022 02:57 PM    WBCUA >20 08/22/2022 02:57 PM    BACTERIA MANY 08/22/2022 02:57 PM    RBCUA >20 08/22/2022 02:57 PM    RBCUA 0-1 02/22/2013 05:30 AM    BLOODU LARGE 08/22/2022 02:57 PM    SPECGRAV 1.020 08/22/2022 02:57 PM    GLUCOSEU Negative 08/22/2022 02:57 PM       Radiology:     Liza Colbert RETROGRADE PYELOGRAM W WO KUB   Final Result   Intraprocedural fluoroscopic spot images as above. See separate procedure   report for more information. XR CHEST PORTABLE   Final Result   No acute process. CT ABDOMEN PELVIS WO CONTRAST Additional Contrast? None   Final Result   1. Mild RIGHT HYDROURETERONEPHROSIS related to a 6 mm calculus in the   proximal right ureter.    2. Apparent soft tissue fullness in the region of the gastric cardia may be   due to redundancy of gastric folds. A MASS LESION CANNOT BE EXCLUDED. Endoscopy is recommended. 3. Hepatomegaly with steatosis. 4. Small hiatal hernia. ASSESSMENT:    Active Hospital Problems    Diagnosis Date Noted    Sepsis ([facility] [A41.9] 08/22/2022     Priority: Medium    Acute on chronic renal insufficiency [N28.9, N18.9] 08/22/2022     Priority: Medium    Hyperkalemia [E87.5] 08/22/2022     Priority: Medium    Renal calculus, right [N20.0] 08/22/2022     Priority: Medium    Renal calculi [N20.0] 08/22/2022     Priority: Medium   EDEMA OF LEGS  HYPERKALEMIA  II DM    PLAN:  SSI  COREG   ROCEPHIN        DVT Prophylaxis:LOVENOX  Diet: ADULT DIET; Regular; 4 carb choices (60 gm/meal)  Code Status: Full Code    PT/OT Eval Status: NA    Dispo - HOME    Electronically signed by Luba Hoang DO on 8/23/2022 at 7:04 PM FACOI       Thank you Dia Cazares DO for the opportunity to be involved in this patient's care.  If you have any questions or concerns please feel free to contact me at [phone]

## 2022-08-23 NOTE — PROGRESS NOTES
Occupational Therapy    OT eval and treat orders received and chart reviewed. Attempt made but pt reports she is able to complete own self care and walk to and from bathroom. Pt up independently in room. Pt with no acute OT needs at this time and no further questions or concerns. OT orders discontinued and pt agreed.     Gary Cedillo, OTR/L

## 2022-08-23 NOTE — PROGRESS NOTES
[facility]  [address] [place] [address], [facility] [place]  [phone]  FAX [phone]      CHIEF UROLOGIC COMPLAINT: ureteral stone with sepsis     HISTORY OF PRESENT ILLNESS:  Patient without new complaints. Feels much better this am.      REVIEW OF SYSTEMS:   CONSTITUTIONAL: negative  HEENT: negative  HEMATOLOGIC: negative  ENDOCRINE: negative  RESPIRATORY: negative  CV: negative  GI: negative  NEURO: negative  ORTHOPEDICS: negative  PSYCHIATRIC: negative  : as above    PAST FAMILY HISTORY:    Family History   Problem Relation Age of Onset    Lung Cancer Maternal Uncle     Cancer Maternal Aunt         Thymus     PAST SOCIAL HISTORY:    Social History     Socioeconomic History    Marital status:       Spouse name: None    Number of children: None    Years of education: None    Highest education level: None   Tobacco Use    Smoking status: Never    Smokeless tobacco: Never   Substance and Sexual Activity    Alcohol use: No    Drug use: No     Social Determinants of Health     Financial Resource Strain: Low Risk     Difficulty of Paying Living Expenses: Not hard at all   Food Insecurity: No Food Insecurity    Worried About Running Out of Food in the Last Year: Never true    Ran Out of Food in the Last Year: Never true       Scheduled Meds:   carvedilol  12.5 mg Oral BID WC    sodium chloride flush  5-40 mL IntraVENous 2 times per day    [Held by provider] enoxaparin  30 mg SubCUTAneous BID    insulin glargine  12 Units SubCUTAneous Nightly    insulin lispro  0-4 Units SubCUTAneous TID WC    insulin lispro  0-4 Units SubCUTAneous Nightly    cefTRIAXone (ROCEPHIN) IV  1,000 mg IntraVENous Q24H     Continuous Infusions:   dextrose      dextrose      sodium chloride      sodium chloride 75 mL/hr at 08/22/22 2241     PRN Meds:.glucose, dextrose bolus **OR** dextrose bolus, glucagon (rDNA), dextrose, glucose, dextrose bolus **OR** dextrose bolus, glucagon (rDNA), dextrose, sodium chloride flush, sodium chloride, polyethylene glycol, acetaminophen **OR** acetaminophen, promethazine **OR** ondansetron, morphine    /70   Pulse 95   Temp 97.3 °F (36.3 °C) (Oral)   Resp 19   Ht 5' 6.5\" (1.689 m)   Wt (!) 308 lb 3.2 oz (139.8 kg)   SpO2 94%   BMI 49.00 kg/m²     Lab Results   Component Value Date    WBC 14.3 (H) 08/22/2022    HGB 12.9 08/22/2022    HCT 38.7 08/22/2022    MCV 88.6 08/22/2022     08/22/2022       Lab Results   Component Value Date    CREATININE 1.4 (H) 08/22/2022       No results found for: PSA    No results found for: LABURIN    No results found for: BC    No results found for: BLOODCULT2    PHYSICAL EXAMINATION:  Skin dry, without rashes  Respirations non-labored, intact  Abdomen soft, non-tender, non-distended  Alert and oriented x3  Stern draining clear urine      ASSESSMENT AND PLAN:  1. Ureteral stone with sepsis of urinary origin. Improving clinically. Stern out today. Treat with antibiotics for 2 weeks and will schedule for right ureteroscopy and laser lithotripsy as an outpatient.       Dorothy Radford MD, MAZAR.  8/23/2022  7:20 AM

## 2022-08-24 VITALS
HEART RATE: 89 BPM | TEMPERATURE: 97.9 F | BODY MASS INDEX: 45.99 KG/M2 | OXYGEN SATURATION: 96 % | DIASTOLIC BLOOD PRESSURE: 78 MMHG | RESPIRATION RATE: 18 BRPM | SYSTOLIC BLOOD PRESSURE: 152 MMHG | WEIGHT: 293 LBS | HEIGHT: 67 IN

## 2022-08-24 LAB
ALBUMIN SERPL-MCNC: 3.6 G/DL (ref 3.5–5.2)
ALP BLD-CCNC: 50 U/L (ref 35–104)
ALT SERPL-CCNC: 12 U/L (ref 0–32)
ANION GAP SERPL CALCULATED.3IONS-SCNC: 12 MMOL/L (ref 7–16)
AST SERPL-CCNC: 11 U/L (ref 0–31)
BASOPHILS ABSOLUTE: 0.04 E9/L (ref 0–0.2)
BASOPHILS RELATIVE PERCENT: 0.2 % (ref 0–2)
BILIRUB SERPL-MCNC: 0.3 MG/DL (ref 0–1.2)
BUN BLDV-MCNC: 29 MG/DL (ref 6–23)
CALCIUM SERPL-MCNC: 9.5 MG/DL (ref 8.6–10.2)
CHLORIDE BLD-SCNC: 99 MMOL/L (ref 98–107)
CO2: 22 MMOL/L (ref 22–29)
CREAT SERPL-MCNC: 1.4 MG/DL (ref 0.5–1)
EOSINOPHILS ABSOLUTE: 0.01 E9/L (ref 0.05–0.5)
EOSINOPHILS RELATIVE PERCENT: 0 % (ref 0–6)
GFR AFRICAN AMERICAN: 46
GFR NON-AFRICAN AMERICAN: 46 ML/MIN/1.73
GLUCOSE BLD-MCNC: 252 MG/DL (ref 74–99)
HCT VFR BLD CALC: 30.9 % (ref 34–48)
HEMOGLOBIN: 10.3 G/DL (ref 11.5–15.5)
IMMATURE GRANULOCYTES #: 0.56 E9/L
IMMATURE GRANULOCYTES %: 2.5 % (ref 0–5)
LACTIC ACID: 1.3 MMOL/L (ref 0.5–2.2)
LYMPHOCYTES ABSOLUTE: 1.24 E9/L (ref 1.5–4)
LYMPHOCYTES RELATIVE PERCENT: 5.6 % (ref 20–42)
MCH RBC QN AUTO: 29.3 PG (ref 26–35)
MCHC RBC AUTO-ENTMCNC: 33.3 % (ref 32–34.5)
MCV RBC AUTO: 87.8 FL (ref 80–99.9)
METER GLUCOSE: 221 MG/DL (ref 74–99)
METER GLUCOSE: 243 MG/DL (ref 74–99)
MONOCYTES ABSOLUTE: 1.38 E9/L (ref 0.1–0.95)
MONOCYTES RELATIVE PERCENT: 6.2 % (ref 2–12)
NEUTROPHILS ABSOLUTE: 19.03 E9/L (ref 1.8–7.3)
NEUTROPHILS RELATIVE PERCENT: 85.5 % (ref 43–80)
PDW BLD-RTO: 12.4 FL (ref 11.5–15)
PLATELET # BLD: 275 E9/L (ref 130–450)
PMV BLD AUTO: 9.3 FL (ref 7–12)
POTASSIUM REFLEX MAGNESIUM: 4.4 MMOL/L (ref 3.5–5)
RBC # BLD: 3.52 E12/L (ref 3.5–5.5)
SODIUM BLD-SCNC: 133 MMOL/L (ref 132–146)
TOTAL PROTEIN: 6.6 G/DL (ref 6.4–8.3)
WBC # BLD: 22.3 E9/L (ref 4.5–11.5)

## 2022-08-24 PROCEDURE — 85025 COMPLETE CBC W/AUTO DIFF WBC: CPT

## 2022-08-24 PROCEDURE — 82962 GLUCOSE BLOOD TEST: CPT

## 2022-08-24 PROCEDURE — 83605 ASSAY OF LACTIC ACID: CPT

## 2022-08-24 PROCEDURE — 6360000002 HC RX W HCPCS: Performed by: INTERNAL MEDICINE

## 2022-08-24 PROCEDURE — 6370000000 HC RX 637 (ALT 250 FOR IP): Performed by: PHYSICIAN ASSISTANT

## 2022-08-24 PROCEDURE — 36415 COLL VENOUS BLD VENIPUNCTURE: CPT

## 2022-08-24 PROCEDURE — 2580000003 HC RX 258: Performed by: INTERNAL MEDICINE

## 2022-08-24 PROCEDURE — 80053 COMPREHEN METABOLIC PANEL: CPT

## 2022-08-24 RX ORDER — CEFUROXIME AXETIL 250 MG/1
250 TABLET ORAL 2 TIMES DAILY
Qty: 14 TABLET | Refills: 0 | Status: SHIPPED | OUTPATIENT
Start: 2022-08-24 | End: 2022-08-31

## 2022-08-24 RX ORDER — TRAMADOL HYDROCHLORIDE 50 MG/1
50 TABLET ORAL EVERY 6 HOURS PRN
Qty: 12 TABLET | Refills: 0 | Status: SHIPPED | OUTPATIENT
Start: 2022-08-24 | End: 2022-08-27

## 2022-08-24 RX ADMIN — CARVEDILOL 12.5 MG: 6.25 TABLET, FILM COATED ORAL at 09:02

## 2022-08-24 RX ADMIN — SODIUM CHLORIDE: 9 INJECTION, SOLUTION INTRAVENOUS at 10:58

## 2022-08-24 RX ADMIN — INSULIN LISPRO 1 UNITS: 100 INJECTION, SOLUTION INTRAVENOUS; SUBCUTANEOUS at 06:22

## 2022-08-24 RX ADMIN — KETOROLAC TROMETHAMINE 15 MG: 30 INJECTION, SOLUTION INTRAMUSCULAR at 04:45

## 2022-08-24 RX ADMIN — INSULIN LISPRO 1 UNITS: 100 INJECTION, SOLUTION INTRAVENOUS; SUBCUTANEOUS at 10:58

## 2022-08-24 ASSESSMENT — PAIN SCALES - GENERAL
PAINLEVEL_OUTOF10: 0
PAINLEVEL_OUTOF10: 0

## 2022-08-24 NOTE — PROGRESS NOTES
color, texture, turgor normal.  No rashes or lesions. Neurologic:  Neurovascularly intact   Psychiatric:  Alert and oriented, thought content appropriate, normal insight                  Labs:   Recent Labs     08/22/22  1457 08/23/22  0645 08/24/22  0338   WBC 14.3* 29.0* 22.3*   HGB 12.9 11.1* 10.3*   HCT 38.7 33.6* 30.9*    295 275     Recent Labs     08/23/22  0645 08/23/22  1605 08/24/22  0338    134 133   K 5.3* 4.5 4.4   CL 99 98 99   CO2 22 23 22   BUN 26* 27* 29*   CREATININE 1.7* 1.6* 1.4*   CALCIUM 9.6 9.6 9.5     Recent Labs     08/22/22  1457 08/23/22  0645 08/24/22  0338   AST 17 13 11   ALT 17 14 12   BILITOT 0.7 0.6 0.3   ALKPHOS 59 47 50     Recent Labs     08/23/22  0645   INR 1.3     No results for input(s): Ann Nguyen in the last 72 hours.   Recent Labs     08/22/22  1457 08/23/22  0645 08/24/22  0338   AST 17 13 11   ALT 17 14 12   BILITOT 0.7 0.6 0.3   ALKPHOS 59 47 50     Recent Labs     08/22/22  1457 08/23/22  0645 08/24/22  0338   LACTA 1.9 1.2 1.3     Lab Results   Component Value Date    LABURIC 5.6 05/10/2019     No results found for: AMMONIA    Assessment:    Active Hospital Problems    Diagnosis Date Noted    Sepsis ([facility] [A41.9] 08/22/2022     Priority: Medium    Acute on chronic renal insufficiency [N28.9, N18.9] 08/22/2022     Priority: Medium    Hyperkalemia [E87.5] 08/22/2022     Priority: Medium    Renal calculus, right [N20.0] 08/22/2022     Priority: Medium    Renal calculi [N20.0] 08/22/2022     Priority: Medium       Plan:  DC HOME TODAY   WARN [place] GARGLES  CALLED IN ANOTHER 7 DAYS OF CEFTIN  TO COMPLETE 14 DAYS OF ABX AS REQUESTED BY UROLOGY    Electronically signed by Sera Robert DO on 8/24/2022 at 3:48 PM [facility]

## 2022-08-24 NOTE — DISCHARGE INSTRUCTIONS
A ureteral stent was inserted during your recent procedure. Unlike a heart \"stent\" which is metal, short, and permanent, this ureteral stent plastic, and temporary. It spans from your kidney, down the ureter, and into your bladder. This will need to be removed, so it is very important that you follow-up with your doctor. IMPORTANT - This ureteral stent will likely cause you to experience frequent urination, urgency to urinate, back/flank pain with urination, and/or blood in the urine. These things are very normal.  Taking the pain medications and/or anti-inflammatories will help to manage this discomfort if present. If you have any questions or concerns you can contact [facility] office at [phone]. Ureteral Stent Placement: What to Expect [address]     A ureteral (say \"you-REE-ter-ul\") stent is a thin, hollow tube that is placed in the ureter to help urine pass from the kidney into the bladder. Ureters are the tubes that connect the kidneys to the bladder. You may have a small amount of blood in your urine for 1 to 3 days after the procedure. While the stent is in place, you may have to urinate more often, feel a sudden need to urinate, or feel like you can't completely empty your bladder. You may feel some pain when you urinate or do strenuous activity. You also may notice a small amount of blood in your urine after strenuous activities. These side effects usually don't prevent people from doing their normal daily activities. You may have a thin string coming out of your urethra. Your urethra is the tube that carries urine from your bladder to outside your body. This string is attached to the stent. Try not to pull on the string. It will be used to pull out the stent when you no longer need it. After the procedure, urine may flow better from your kidneys to your bladder. A ureteral stent may be left in place for several days or for as long as several months.  Your doctor will take it out when you no longer need it. Or, in some cases, it may be taken out at home. This care sheet gives you a general idea about how long it will take for you to recover. But each person recovers at a different pace. Follow the steps below to get better as quickly as possible. How can you care for yourself at home? Activity    Rest when you feel tired. Getting enough sleep will help you recover. Avoid strenuous activities, such as bicycle riding, jogging, weight lifting, or aerobic exercise, until your doctor says it is okay. Ask your doctor when you can drive again. Most people are able to return to work the day after the procedure. If your work requires intense activity, you may feel pain in your kidney area or get tired easily. If this happens, you may need to do less strenuous activities while the stent is in. Ask your doctor when it is okay for you to have sex. Diet    You can eat your normal diet. If your stomach is upset, try bland, low-fat foods like plain rice, broiled chicken, toast, and yogurt. Drink plenty of fluids (unless your doctor tells you not to). Medicines    Your doctor will tell you if and when you can restart your medicines. You will also get instructions about taking any new medicines. If you take aspirin or some other blood thinner, ask your doctor if and when to start taking it again. Make sure that you understand exactly what your doctor wants you to do. Be safe with medicines. Take pain medicines exactly as directed. If the doctor gave you a prescription medicine for pain, take it as prescribed. If you are not taking a prescription pain medicine, ask your doctor if you can take an over-the-counter medicine. If you think your pain medicine is making you sick to your stomach: Take your medicine after meals (unless your doctor has told you not to). Ask your doctor for a different pain medicine.      If your doctor prescribed antibiotics, take them as directed. Do not stop taking them just because you feel better. You need to take the full course of antibiotics. Follow-up care is a key part of your treatment and safety. Be sure to make and go to all appointments, and call your doctor if you are having problems. It's also a good idea to know your test results and keep a list of the medicines you take. When should you call for help? Call 911 anytime you think you may need emergency care. For example, call if:    You passed out (lost consciousness). You have severe trouble breathing. You have sudden chest pain and shortness of breath, or you cough up blood. You have severe belly pain. Call your doctor now or seek immediate medical care if:    Part or all of the stent comes out of your urethra. You have pain that does not get better after you take pain medicine. You have symptoms of a urinary infection. For example: You have blood or pus in your urine. You have pain in your back just below your rib cage. This is called flank pain. You have a fever, chills, or body aches. It hurts to urinate. You have groin or belly pain. You cannot control when you urinate, or you leak urine. Watch closely for changes in your health, and be sure to contact your doctor if you have any problems. Where can you learn more? Go to [email or web address]://MoodsnapcodyVettery.GeeYee. org and sign in to your [employer] account. Enter K127 in the [facility] box to learn more about \"Ureteral Stent Placement: What to Expect at Home. \"     If you do not have an account, please click on the \"Sign Up Now\" link. Current as of: February 10, 2021               Content Version: 12.9  © 1292-7570 Healthwise, Incorporated. Care instructions adapted under license by [facility] ([facility]).  If you have questions about a medical condition or this instruction, always ask your healthcare professional. [facility] any warranty or liability for your use of this information.

## 2022-08-24 NOTE — PROGRESS NOTES
CLINICAL PHARMACY NOTE: MEDS TO BEDS    Total # of Prescriptions Filled: 2   The following medications were delivered to the patient:  Tramadol 50  Cefuroxime 250    Additional Documentation:

## 2022-08-24 NOTE — CARE COORDINATION
Social Work/Discharge Planning:  Discharge order noted. Met with patient and completed assessment. Explained Social Work role. Patient states her son lives with her in a two story house, but she stays on the first floor. PTA she is independent with no adaptive device. She has a cane and a glucometer. She denies any history with hhc or snf. Patient PCP is Dr. Hailee Sam and [facility]. She will discharge home today on oral antibiotic and denies any home care needs.    Electronically signed by SANDRA Drummond on 8/24/2022 at 10:12 AM

## 2022-08-24 NOTE — PROGRESS NOTES
CLINICAL PHARMACY NOTE: MEDS TO BEDS    Total # of Prescriptions Filled: 3   The following medications were delivered to the patient:  Ibu 800  Oxycodone 5  Labetalol 100    Additional Documentation:

## 2022-08-25 ENCOUNTER — OFFICE VISIT (OUTPATIENT)
Dept: ENDOCRINOLOGY | Age: 62
End: 2022-08-25
Payer: MEDICAID

## 2022-08-25 VITALS
HEIGHT: 66 IN | WEIGHT: 293 LBS | HEART RATE: 82 BPM | SYSTOLIC BLOOD PRESSURE: 163 MMHG | OXYGEN SATURATION: 99 % | BODY MASS INDEX: 47.09 KG/M2 | DIASTOLIC BLOOD PRESSURE: 99 MMHG | RESPIRATION RATE: 18 BRPM

## 2022-08-25 DIAGNOSIS — E55.9 VITAMIN D DEFICIENCY: ICD-10-CM

## 2022-08-25 DIAGNOSIS — N18.2 TYPE 2 DIABETES MELLITUS WITH STAGE 2 CHRONIC KIDNEY DISEASE, WITHOUT LONG-TERM CURRENT USE OF INSULIN (HCC): Primary | ICD-10-CM

## 2022-08-25 DIAGNOSIS — E66.01 CLASS 3 SEVERE OBESITY DUE TO EXCESS CALORIES WITHOUT SERIOUS COMORBIDITY WITH BODY MASS INDEX (BMI) OF 45.0 TO 49.9 IN ADULT (HCC): ICD-10-CM

## 2022-08-25 DIAGNOSIS — E11.22 TYPE 2 DIABETES MELLITUS WITH STAGE 2 CHRONIC KIDNEY DISEASE, WITHOUT LONG-TERM CURRENT USE OF INSULIN (HCC): Primary | ICD-10-CM

## 2022-08-25 LAB
ORGANISM: ABNORMAL
ORGANISM: ABNORMAL
URINE CULTURE, ROUTINE: ABNORMAL
URINE CULTURE, ROUTINE: ABNORMAL

## 2022-08-25 PROCEDURE — 1036F TOBACCO NON-USER: CPT | Performed by: INTERNAL MEDICINE

## 2022-08-25 PROCEDURE — G8417 CALC BMI ABV UP PARAM F/U: HCPCS | Performed by: INTERNAL MEDICINE

## 2022-08-25 PROCEDURE — G8427 DOCREV CUR MEDS BY ELIG CLIN: HCPCS | Performed by: INTERNAL MEDICINE

## 2022-08-25 PROCEDURE — 2022F DILAT RTA XM EVC RTNOPTHY: CPT | Performed by: INTERNAL MEDICINE

## 2022-08-25 PROCEDURE — 1111F DSCHRG MED/CURRENT MED MERGE: CPT | Performed by: INTERNAL MEDICINE

## 2022-08-25 PROCEDURE — 3044F HG A1C LEVEL LT 7.0%: CPT | Performed by: INTERNAL MEDICINE

## 2022-08-25 PROCEDURE — 99214 OFFICE O/P EST MOD 30 MIN: CPT | Performed by: INTERNAL MEDICINE

## 2022-08-25 PROCEDURE — 3017F COLORECTAL CA SCREEN DOC REV: CPT | Performed by: INTERNAL MEDICINE

## 2022-08-25 NOTE — PROGRESS NOTES
[address] [place] [address] Department of Endocrinology Diabetes and Metabolism   [address] [facility] [address]   Phone: [phone]  Fax: [phone]    Date of Service: 8/25/2022  Primary Care Physician: Efrain Hanson DO  Provider: Pete Dueñas MD     Reason for the visit:  Controlled type 2 diabetes mellitus    History of Present Illness: The history is provided by the patient. No  was used. Accuracy of the patient data is excellent.   Seth Cruz is a very pleasant 64 y.o. female seen today for diabetes management     Seth Cruz was diagnosed with diabetes at age of 54  and currently on  lantus 14 units at night,  and amaryl 4 mg daily, Rybelsus 14 mg daily   Off Metfomrin due to kidney function issue   She could not tolerate Trulicity in the past as it caused GI issues  The patient has been checking blood sugar daily and most readings at goal   Most recent A1c results summarized below  Lab Results   Component Value Date/Time    LABA1C 6.7 08/23/2022 06:45 AM    LABA1C 6.8 05/04/2022 01:01 PM    LABA1C 7.4 02/02/2022 12:32 PM     Patient has had no hypoglycemic episodes   The patient has been mindful of what has been eating and following diabetic diet as encouraged    I reviewed current medications and the patient has no issues with diabetes medications  Seth Cruz is up to date with eye exam and denied any history of diabetic retinopathy     The patient performs  own feet care  Microvascular complications:  No Retinopathy, Nephropathy or Neuropathy   Macrovascular complications: no CAD, PVD, or Stroke  The patient refuses Flushot and pneumonia vaccine   No HX of pancreatitis  No Hx of MTC  No HX of gastroparesis   No HX of UTI/Mycotic infection     PAST MEDICAL HISTORY   Past Medical History:   Diagnosis Date    CHF (congestive heart failure) ([address]     Chronic kidney disease     Diabetes mellitus ([address]     Hypertension     Hypoparathyroidism ([address] PAST SURGICAL HISTORY   Past Surgical History:   Procedure Laterality Date    BLADDER SURGERY Right 8/22/2022    CYSTOSCOPY RETROGRADE PYELOGRAM STENT INSERTION performed by Srikanth Hilario MD at [facility] Left 04/02/2013    benign    OVARIAN CYST REMOVAL  12/05/08    left    SUREKHA AND BSO (CERVIX REMOVED)      TONSILLECTOMY         SOCIAL HISTORY   Tobacco:   reports that she has never smoked. She has never used smokeless tobacco.  Alcohol:   reports no history of alcohol use. Drugs:   reports no history of drug use. FAMILY HISTORY   Family History   Problem Relation Age of Onset    Lung Cancer Maternal Uncle     Cancer Maternal Aunt         Thymus       ALLERGIES AND DRUG REACTIONS   Allergies   Allergen Reactions    Trulicity [Dulaglutide] Nausea Only and Other (See Comments)     Severe GI issues       CURRENT MEDICATIONS   Current Outpatient Medications   Medication Sig Dispense Refill    cefUROXime (CEFTIN) 250 MG tablet Take 1 tablet by mouth 2 times daily for 7 days 14 tablet 0    traMADol (ULTRAM) 50 MG tablet Take 1 tablet by mouth every 6 hours as needed for Pain for up to 3 days. Intended supply: 3 days. Take lowest dose possible to manage pain 12 tablet 0    Insulin Pen Needle 32G X 4 MM MISC 1 each by Does not apply route daily 100 each 6    glimepiride (AMARYL) 2 MG tablet TAKE ONE TABLET BY MOUTH DAILY WITH BREAKFAST 90 tablet 3    blood glucose monitor strips One-Touch Ultra strips.  Check DAILY 100 strip 3    Semaglutide (RYBELSUS) 14 MG TABS Take 1 tablet daily 30 tablet 5    carvedilol (COREG) 12.5 MG tablet TAKE ONE TABLET BY MOUTH TWICE A DAY WITH MEALS      SENSIPAR 30 MG tablet TAKE ONE TABLET BY MOUTH ONCE DAILY      insulin glargine (LANTUS SOLOSTAR) 100 UNIT/ML injection pen Inject 14 Units into the skin nightly 15 mL 5    doxazosin (CARDURA) 2 MG tablet TAKE ONE TABLET BY MOUTH AT BEDTIME      ONE TOUCH ULTRA TEST strip USE 1 TWICE DAILY  3    Blood Glucose Monitoring Suppl (ONE TOUCH ULTRA 2) w/Device KIT use as directed  0    ONE TOUCH LANCETS MISC 1 each by Does not apply route 2 times daily Please dispense brand/quantity lancets need for patient's testing device 100 each 3    vitamin D (ERGOCALCIFEROL) 24662 UNITS CAPS capsule Take 50,000 Units by mouth every 14 days        No current facility-administered medications for this visit. Review of Systems  Constitutional: No fever, no chills, no diaphoresis, no generalized weakness. HEENT: No blurred vision, No sore throat, no ear pain, no hair loss  Neck: denied any neck swelling, difficulty swallowing,   Cardio-pulmonary: No CP, SOB or palpitation, No orthopnea or PND. No cough or wheezing. GI: No N/V/D, no constipation, No abdominal pain, no melena or hematochezia   : Denied any dysuria, hematuria, flank pain, discharge, or incontinence. Skin: denied any rash, ulcer, Hirsute, or hyperpigmentation. MSK: denied any joint deformity, joint pain/swelling, muscle pain, or back pain. Neuro: no numbness, no tingling, no weakness, _    OBJECTIVE    BP (!) 163/99   Pulse 82   Resp 18   Ht 5' 6\" (1.676 m)   Wt (!) 303 lb (137.4 kg)   SpO2 99%   BMI 48.91 kg/m²   BP Readings from Last 4 Encounters:   08/25/22 (!) 163/99   08/24/22 (!) 152/78   06/22/22 (!) 148/90   04/20/22 (!) 188/115     Wt Readings from Last 6 Encounters:   08/25/22 (!) 303 lb (137.4 kg)   08/24/22 297 lb (134.7 kg)   06/22/22 300 lb (136.1 kg)   04/20/22 298 lb (135.2 kg)   02/16/22 (!) 301 lb (136.5 kg)   12/15/21 300 lb (136.1 kg)       Physical examination:  General: awake alert, oriented x3, no abnormal position or movements. HEENT: normocephalic non-traumatic, no exophthalmos   Neck: supple, no LN enlargement, no thyromegaly, no thyroid tenderness, no JVD. Pulm: Clear equal air entry no added sounds, no wheezing or rhonchi    CVS: S1 + S2, no murmur, no heave.  Dorsalis pedis pulse palpable   Abd: soft lax, no tenderness, no organomegaly, audible bowel sounds. Skin: warm, no lesions, no rash.  No callus, no Ulcers, No acanthosis nigricans  Musculoskeletal: No back tenderness, no kyphosis/scoliosis    Neuro: CN intact, Monofilament sensation normal  bilateral , muscle power normal  Psych: normal mood, and affect      Review of Laboratory Data:  I personally reviewed the following lab:  Lab Results   Component Value Date/Time    WBC 22.3 (H) 08/24/2022 03:38 AM    RBC 3.52 08/24/2022 03:38 AM    HGB 10.3 (L) 08/24/2022 03:38 AM    HCT 30.9 (L) 08/24/2022 03:38 AM    MCV 87.8 08/24/2022 03:38 AM    MCH 29.3 08/24/2022 03:38 AM    MCHC 33.3 08/24/2022 03:38 AM    RDW 12.4 08/24/2022 03:38 AM     08/24/2022 03:38 AM    MPV 9.3 08/24/2022 03:38 AM      Lab Results   Component Value Date/Time     08/24/2022 03:38 AM    K 4.4 08/24/2022 03:38 AM    CO2 22 08/24/2022 03:38 AM    BUN 29 (H) 08/24/2022 03:38 AM    CREATININE 1.4 (H) 08/24/2022 03:38 AM    CALCIUM 9.5 08/24/2022 03:38 AM    LABGLOM 46 08/24/2022 03:38 AM    GFRAA 46 08/24/2022 03:38 AM      Lab Results   Component Value Date/Time    TSH 2.110 10/24/2017 09:15 AM    T4FREE 1.08 09/17/2014 11:39 AM    C9PSDXB 7.4 09/17/2014 11:39 AM    FT3 2.2 09/17/2014 11:39 AM    U1JXXJB 90.57 09/17/2014 11:39 AM    TPOABS 0.4 09/17/2014 11:39 AM     Lab Results   Component Value Date/Time    LABA1C 6.7 08/23/2022 06:45 AM    GLUCOSE 252 08/24/2022 03:38 AM    GLUCOSE 77 12/07/2010 03:00 PM    MALBCR - 05/04/2022 12:52 PM    LABMICR <12.0 05/04/2022 12:52 PM    LABCREA 77 05/04/2022 12:52 PM     Lab Results   Component Value Date/Time    LABA1C 6.7 08/23/2022 06:45 AM    LABA1C 6.8 05/04/2022 01:01 PM    LABA1C 7.4 02/02/2022 12:32 PM     Lab Results   Component Value Date/Time    TRIG 178 05/04/2022 01:01 PM    HDL 46 05/04/2022 01:01 PM    LDLCALC 115 05/04/2022 01:01 PM    CHOL 197 05/04/2022 01:01 PM     Lab Results   Component Value Date/Time    VITD25 18 02/20/2020 02:37 PM YXRZ72 16 11/04/2019 11:44 AM       ASSESSMENT & RECOMMENDATIONS   Jacinta Monroe, a 64 y.o.-old female seen in for the following issues       Assessment:      Diagnosis Orders   1. Type 2 diabetes mellitus with stage 2 chronic kidney disease, without long-term current use of insulin (HCC)  Hemoglobin A1C      2. Vitamin D deficiency        3. Class 3 severe obesity due to excess calories without serious comorbidity with body mass index (BMI) of 45.0 to 49.9 in adult [facility])            Plan:     1. Uncontrolled type 2 diabetes mellitus with hyperglycemia (HCC)   Under good control   Continue lantus 14  units at night, and amaryl 4 mg daily, rybelsus 14 mg daily   Contineu checking BG daily   Discussed with patient A1c and blood sugar goals  The patient counseled about the complications of uncontrolled diabetes    2. Uncontrolled type 2 diabetes mellitus with microalbuminuria ([facility]   Along with nephrology       3. Class 3 severe obesity due to excess calories without serious comorbidity with body mass index (BMI) of 45.0 to 49.9 in adult ([facility]    4. Vitamin D deficiency   Continue vitD supplement      I personally reviewed external notes from PCP and other patient's care team providers, and personally interpreted labs associated with the above diagnosis. I also ordered labs to further assess and manage the above addressed medical conditions    Return in about 4 months (around 12/25/2022) for DM type 2, VitD deficiency. The above issues were reviewed with the patient who understood and agreed with the plan. Thank you for allowing us to participate in the care of this patient. Please do not hesitate to contact us with any additional questions. Tristen Merino MD   [facility] Diabetes Care and Endocrinology   [address] [place] [address]   Phone: [phone]  Fax: [phone]  --------------------------------------------  An electronic signature was used to authenticate this note.  Tristen Merino MD on 8/25/2022 at 11:32 AM

## 2022-08-26 NOTE — DISCHARGE SUMMARY
Hospitalist Discharge Summary    Patient ID: Collette Saltness   Patient : 1960  Patient's PCP: Joey Jimenez DO    Admit Date: 2022   Admitting Physician: No admitting provider for patient encounter. Discharge Date:  2022   Discharge Physician: Jeannie Aragon DO   Discharge Condition: Stable  Discharge Disposition: Home      Discharge Diagnoses: Active Hospital Problems    Diagnosis Date Noted    Sepsis ([facility] [A41.9] 2022     Priority: Medium    Acute on chronic renal insufficiency [N28.9, N18.9] 2022     Priority: Medium    Hyperkalemia [E87.5] 2022     Priority: Medium    Renal calculus, right [N20.0] 2022     Priority: Medium    Renal calculi [N20.0] 2022     Priority: [address] course in brief:    64 y.o. female presented with RIGHT FLANK PAIN, FOUND TO HAVE A KIDNEY STONE, HAD A STENT PLACED LAST PM. DID NOT HAVE HEMATURIA** TO [place] HOME TODAY 14 days of abx ( MED LIST SAYS 7 DAYS, ANOTHER 7 WAS CALLED INTO [facility], PATIENT NOTIFIED)       PHYSICAL EXAM:    BP (!) 152/78   Pulse 89   Temp 97.9 °F (36.6 °C) (Oral)   Resp 18   Ht 5' 6.5\" (1.689 m)   Wt 297 lb (134.7 kg)   SpO2 96%   BMI 47.22 kg/m²        General appearance:  No apparent distress,   HEENT:  Normal cephalic,  SLIGHT ERYTHEMA OF UVULA  Neck: Supple, with full range of motion. Trachea midline. Respiratory:  Normal respiratory effort. Clear to auscultation, Cardiovascular:  Regular rate and rhythm   Abdomen: Soft, non-tender, non-distended with normal bowel sounds. Musculoskeletal:  No clubbing, cyanosis POS edema bilaterally. POS TENDER RIGHT FLANK AREA (LLOYDS   Skin: Skin color, texture, turgor normal.  No rashes or lesions. Neurologic:  Neurovascularly intact   Psychiatric:  Alert and oriented, thought content appropriate, normal insight          Prior to Admission medications    Medication Sig Start Date End Date Taking?  Authorizing Provider MD       Consults:   IP CONSULT TO IV TEAM  IP CONSULT TO UROLOGY  IP CONSULT TO SOCIAL WORK  IP CONSULT TO IV TEAM            Discharge Instructions / Follow up:    Future Appointments   Date Time Provider Hussein Vincent   12/14/2022  3:45 PM DO DARRYL Khan [place] [facility] [facility]   12/27/2022  2:30 PM Tosha Gomez, APRN - NP [facility]       Continued appropriate risk factor modification of blood pressure, diabetes and serum lipids will remain essential to reducing risk of future atherosclerotic development    Activity: activity as tolerated    Significant labs:  CBC:   Recent Labs     08/24/22  0338   WBC 22.3*   RBC 3.52   HGB 10.3*   HCT 30.9*   MCV 87.8   RDW 12.4        BMP:   Recent Labs     08/23/22  1605 08/24/22 0338    133   K 4.5 4.4   CL 98 99   CO2 23 22   BUN 27* 29*   CREATININE 1.6* 1.4*     LFT:  Recent Labs     08/24/22 0338   PROT 6.6   ALKPHOS 50   ALT 12   AST 11   BILITOT 0.3     PT/INR: No results for input(s): INR, APTT in the last 72 hours. BNP: No results for input(s): BNP in the last 72 hours.   Hgb A1C:   Lab Results   Component Value Date    LABA1C 6.7 (H) 08/23/2022     Folate and B12:   Lab Results   Component Value Date    OBNZIDIQ82 125 12/07/2010   ,   Lab Results   Component Value Date    FOLATE 16.3 12/07/2010     Thyroid Studies:   Lab Results   Component Value Date    TSH 2.110 10/24/2017    T1UMEEE 90.57 09/17/2014    C0IHXVB 7.4 09/17/2014       Urinalysis:    Lab Results   Component Value Date/Time    NITRU Negative 08/22/2022 02:57 PM    WBCUA >20 08/22/2022 02:57 PM    BACTERIA MANY 08/22/2022 02:57 PM    RBCUA >20 08/22/2022 02:57 PM    RBCUA 0-1 02/22/2013 05:30 AM    BLOODU LARGE 08/22/2022 02:57 PM    SPECGRAV 1.020 08/22/2022 02:57 PM    GLUCOSEU Negative 08/22/2022 02:57 PM       Imaging:  CT ABDOMEN PELVIS WO CONTRAST Additional Contrast? None    Result Date: 8/22/2022  EXAMINATION: CT OF THE ABDOMEN AND PELVIS WITHOUT CONTRAST 8/22/2022 6:33 pm TECHNIQUE: CT of the abdomen and pelvis was performed without the administration of intravenous contrast. Multiplanar reformatted images are provided for review. Automated exposure control, iterative reconstruction, and/or weight based adjustment of the mA/kV was utilized to reduce the radiation dose to as low as reasonably achievable. COMPARISON: CT abdomen and pelvis, 11/06/2018. HISTORY: ORDERING SYSTEM PROVIDED HISTORY: r/o kidney stone TECHNOLOGIST PROVIDED HISTORY: Reason for exam:->r/o kidney stone Additional Contrast?->None Decision Support Exception - unselect if not a suspected or confirmed emergency medical condition->Emergency Medical Condition (MA) FINDINGS: Lower Chest: Small hiatal hernia. The lung bases are otherwise clear. The heart is normal in size. No pleural or pericardial effusion noted. Organs: Liver: The liver is enlarged, measuring 25.5 cm in length. Mild-to-moderate diffuse steatosis. No focal lesion or ductal dilatation. Gallbladder: Unremarkable. Pancreas: Unremarkable. Spleen:  Unremarkable. Adrenals: Unremarkable. Kidneys: Mild right hydroureteronephrosis related to a 6 mm calculus in the proximal right ureter. No left urolithiasis or hydronephrosis. GI/Bowel: Apparent soft tissue along the gastric cardia is of an unclear etiology. It may represent redundancy of the gastric folds 0(series 2, image 30). A mass lesion cannot be excluded. Mild distal colonic diverticulosis without diverticulitis. No bowel wall thickening or obstruction. Normal appendix. Pelvis: The urinary bladder is unremarkable. The uterus is surgically absent. Peritoneum/Retroperitoneum: Mild calcified atherosclerosis is seen in the aorta. No aneurysm. No lymphadenopathy. No free air or free fluid is seen. Bones/Soft Tissues: The visualized bones are intact without fracture or focal lesion.   Prominent loss of disc height at L5-S1.     1. Mild RIGHT HYDROURETERONEPHROSIS related to a 6 mm calculus in the proximal right ureter. 2. Apparent soft tissue fullness in the region of the gastric cardia may be due to redundancy of gastric folds. A MASS LESION CANNOT BE EXCLUDED. Endoscopy is recommended. 3. Hepatomegaly with steatosis. 4. Small hiatal hernia. FL RETROGRADE PYELOGRAM W WO KUB    Result Date: 8/22/2022  EXAMINATION: SPOT FLUOROSCOPIC IMAGES 8/22/2022 8:26 pm TECHNIQUE: Fluoroscopy was provided by the radiology department for procedure. Radiologist was not present during examination. FLUOROSCOPY DOSE AND TYPE OR TIME AND EXPOSURES: 4 images FLUOROSCOPY TIME: 0.3 minutes COMPARISON: CT abdomen and pelvis from August 22, 2022 HISTORY: ORDERING SYSTEM PROVIDED HISTORY: cysto retrograde pyelogram stent insertion TECHNOLOGIST PROVIDED HISTORY: Reason for exam:->cysto retrograde pyelogram stent insertion Intraprocedural imaging. FINDINGS: 4 spot images of the abdomen were obtained. Fluoroscopic support provided to subspecialty service for right-sided retrograde pyelogram and ureteric stent placement. No obvious complication on the images provided. Please see subspecialty report for full details and interpretation of real time imaging. Intraprocedural fluoroscopic spot images as above. See separate procedure report for more information. XR CHEST PORTABLE    Result Date: 8/22/2022  EXAMINATION: ONE XRAY VIEW OF THE CHEST 8/22/2022 7:43 pm COMPARISON: 02/16/2018 HISTORY: ORDERING SYSTEM PROVIDED HISTORY: other TECHNOLOGIST PROVIDED HISTORY: Reason for exam:->other FINDINGS: The lungs are without acute focal process. There is no effusion or pneumothorax. The cardiomediastinal silhouette is without acute process. The osseous structures are without acute process. No acute process.        Discharge Medications:      Medication List        START taking these medications      cefUROXime 250 MG tablet  Commonly known as: CEFTIN  Take 1 tablet by mouth 2 times daily for 7 days     traMADol 50 MG tablet  Commonly known as: Ultram  Take 1 tablet by mouth every 6 hours as needed for Pain for up to 3 days. Intended supply: 3 days. Take lowest dose possible to manage pain            CHANGE how you take these medications      carvedilol 12.5 MG tablet  Commonly known as: COREG  What changed: Another medication with the same name was removed. Continue taking this medication, and follow the directions you see here. CONTINUE taking these medications      doxazosin 2 MG tablet  Commonly known as: CARDURA     glimepiride 2 MG tablet  Commonly known as: AMARYL  TAKE ONE TABLET BY MOUTH DAILY WITH BREAKFAST     Insulin Pen Needle 32G X 4 MM Misc  1 each by Does not apply route daily     Lantus SoloStar 100 UNIT/ML injection pen  Generic drug: insulin glargine  Inject 14 Units into the skin nightly     ONE TOUCH LANCETS Misc  1 each by Does not apply route 2 times daily Please dispense brand/quantity lancets need for patient's testing device     ONE TOUCH ULTRA 2 w/Device Kit     * ONE TOUCH ULTRA TEST strip  Generic drug: blood glucose test strips     * blood glucose test strips  One-Touch Ultra strips. Check DAILY     Rybelsus 14 MG Tabs  Generic drug: Semaglutide  Take 1 tablet daily     Sensipar 30 MG tablet  Generic drug: cinacalcet     vitamin D 1.25 MG (98851 UT) Caps capsule  Commonly known as: ERGOCALCIFEROL           * This list has 2 medication(s) that are the same as other medications prescribed for you. Read the directions carefully, and ask your doctor or other care provider to review them with you.                 STOP taking these medications      metFORMIN 750 MG extended release tablet  Commonly known as: GLUCOPHAGE-XR     spironolactone 25 MG tablet  Commonly known as: ALDACTONE               Where to Get Your Medications        These medications were sent to [facility], [facility] [phone]  [facility] Jonny Ricketts [address] [place] [address] Phone: [phone]   cefUROXime 250 MG tablet       You can get these medications from any pharmacy    Bring a paper prescription for each of these medications  traMADol 50 MG tablet         Time Spent on discharge is more than 45 minutes in the examination, evaluation, counseling and review of medications and discharge plan.    +++++++++++++++++++++++++++++++++++++++++++++++++  Elver Navarrete, DO  [address], [place]  +++++++++++++++++++++++++++++++++++++++++++++++++  NOTE: This report was transcribed using voice recognition software. Every effort was made to ensure accuracy; however, inadvertent computerized transcription errors may be present.

## 2022-08-27 LAB
BLOOD CULTURE, ROUTINE: NORMAL
CULTURE, BLOOD 2: NORMAL

## 2022-09-15 NOTE — PROGRESS NOTES
[facility] PRE-ADMISSION TESTING INSTRUCTIONS    The Preadmission Testing patient is instructed accordingly using the following criteria (check applicable):    ARRIVAL INSTRUCTIONS:  [x] Parking the day of Surgery is located in the Main Entrance lot. Upon entering the door, make an immediate right to the surgery reception desk    [x] Bring photo ID and insurance card    [] Bring in a copy of Living will or Durable Power of  papers. [x] Please be sure to arrange for responsible adult to provide transportation to and from the hospital    [x] Please arrange for responsible adult to be with you for the 24 hour period post procedure due to having anesthesia    [x] If you awake am of surgery not feeling well or have temperature >100 please call [phone]    GENERAL INSTRUCTIONS:    [x] Nothing by mouth after midnight, including gum, candy, mints or water    [x] You may brush your teeth, but do not swallow any water    [x] Take medications as instructed with 1-2 oz of water    [] Stop herbal supplements and vitamins 5 days prior to procedure    [] Follow preop dosing of blood thinners per physician instructions    [x] Take 1/2 dose of evening insulin, but no insulin after midnight    [x] No oral diabetic medications after midnight    [x] If diabetic and have low blood sugar or feel symptomatic, take 1-2oz apple juice only    [] Bring inhalers day of surgery    [] Bring C-PAP/ Bi-Pap day of surgery    [] Bring urine specimen day of surgery    [x] Shower or bath with soap, lather and rinse well, AM of Surgery, no lotion, powders or creams to surgical site    [] Follow bowel prep as instructed per surgeon    [x] No tobacco products within 24 hours of surgery     [x] No alcohol or illegal drug use within 24 hours of surgery.     [x] Jewelry, body piercing's, eyeglasses, contact lenses and dentures are not permitted into surgery (bring cases)      [x] Please do not wear any nail polish, make up or hair products on the day of surgery    [x] You can expect a call the business day prior to procedure to notify you if your arrival time changes    [x] If you receive a survey after surgery we would greatly appreciate your comments    [] Parent/guardian of a minor must accompany their child and remain on the premises  the entire time they are under our care     [] Pediatric patients may bring favorite toy, blanket or comfort item with them    [] A caregiver or family member must remain with the patient during their stay if they are mentally handicapped, have dementia, disoriented or unable to use a call light or would be a safety concern if left unattended    [x] Please notify surgeon if you develop any illness between now and time of surgery (cold, cough, sore throat, fever, nausea, vomiting) or any signs of infections  including skin, wounds, and dental.    [x]  The Outpatient Pharmacy is available to fill your prescription here on your day of surgery, ask your preop nurse for details    [] Other instructions    EDUCATIONAL MATERIALS PROVIDED:    [] PAT Preoperative Education Packet/Booklet     [] Medication List    [] Transfusion bracelet applied with instructions    [] Shower with soap, lather and rinse well, and use CHG wipes provided the evening before surgery as instructed    [] Incentive spirometer with instructions

## 2022-09-20 ENCOUNTER — PREP FOR PROCEDURE (OUTPATIENT)
Dept: UROLOGY | Age: 62
End: 2022-09-20

## 2022-09-20 RX ORDER — SODIUM CHLORIDE 0.9 % (FLUSH) 0.9 %
5-40 SYRINGE (ML) INJECTION EVERY 12 HOURS SCHEDULED
Status: CANCELLED | OUTPATIENT
Start: 2022-09-20

## 2022-09-20 RX ORDER — SODIUM CHLORIDE 9 MG/ML
INJECTION, SOLUTION INTRAVENOUS PRN
Status: CANCELLED | OUTPATIENT
Start: 2022-09-20

## 2022-09-20 RX ORDER — SODIUM CHLORIDE 9 MG/ML
INJECTION, SOLUTION INTRAVENOUS CONTINUOUS
Status: CANCELLED | OUTPATIENT
Start: 2022-09-20

## 2022-09-20 RX ORDER — SODIUM CHLORIDE 0.9 % (FLUSH) 0.9 %
5-40 SYRINGE (ML) INJECTION PRN
Status: CANCELLED | OUTPATIENT
Start: 2022-09-20

## 2022-09-21 ENCOUNTER — HOSPITAL ENCOUNTER (OUTPATIENT)
Dept: GENERAL RADIOLOGY | Age: 62
Discharge: HOME | End: 2022-09-23
Attending: UROLOGY
Payer: MEDICAID

## 2022-09-21 ENCOUNTER — ANESTHESIA EVENT (OUTPATIENT)
Dept: OPERATING ROOM | Age: 62
End: 2022-09-21
Payer: MEDICAID

## 2022-09-21 ENCOUNTER — ANESTHESIA (OUTPATIENT)
Dept: OPERATING ROOM | Age: 62
End: 2022-09-21
Payer: MEDICAID

## 2022-09-21 ENCOUNTER — HOSPITAL ENCOUNTER (OUTPATIENT)
Age: 62
Setting detail: OUTPATIENT SURGERY
Discharge: HOME | End: 2022-09-21
Attending: UROLOGY | Admitting: UROLOGY
Payer: MEDICAID

## 2022-09-21 VITALS
HEART RATE: 74 BPM | BODY MASS INDEX: 45.99 KG/M2 | SYSTOLIC BLOOD PRESSURE: 149 MMHG | OXYGEN SATURATION: 98 % | RESPIRATION RATE: 18 BRPM | DIASTOLIC BLOOD PRESSURE: 85 MMHG | TEMPERATURE: 97 F | HEIGHT: 67 IN | WEIGHT: 293 LBS

## 2022-09-21 DIAGNOSIS — N20.0 URIC ACID NEPHROLITHIASIS: ICD-10-CM

## 2022-09-21 DIAGNOSIS — R52 PAIN: ICD-10-CM

## 2022-09-21 LAB — METER GLUCOSE: 130 MG/DL (ref 74–99)

## 2022-09-21 PROCEDURE — 2709999900 HC NON-CHARGEABLE SUPPLY: Performed by: UROLOGY

## 2022-09-21 PROCEDURE — 3600000003 HC SURGERY LEVEL 3 BASE: Performed by: UROLOGY

## 2022-09-21 PROCEDURE — 7100000010 HC PHASE II RECOVERY - FIRST 15 MIN: Performed by: UROLOGY

## 2022-09-21 PROCEDURE — 88300 SURGICAL PATH GROSS: CPT

## 2022-09-21 PROCEDURE — 6360000002 HC RX W HCPCS: Performed by: NURSE ANESTHETIST, CERTIFIED REGISTERED

## 2022-09-21 PROCEDURE — 82962 GLUCOSE BLOOD TEST: CPT

## 2022-09-21 PROCEDURE — 3700000001 HC ADD 15 MINUTES (ANESTHESIA): Performed by: UROLOGY

## 2022-09-21 PROCEDURE — C1758 CATHETER, URETERAL: HCPCS | Performed by: UROLOGY

## 2022-09-21 PROCEDURE — C1769 GUIDE WIRE: HCPCS | Performed by: UROLOGY

## 2022-09-21 PROCEDURE — 2500000003 HC RX 250 WO HCPCS: Performed by: NURSE ANESTHETIST, CERTIFIED REGISTERED

## 2022-09-21 PROCEDURE — 2580000003 HC RX 258: Performed by: NURSE PRACTITIONER

## 2022-09-21 PROCEDURE — C2617 STENT, NON-COR, TEM W/O DEL: HCPCS | Performed by: UROLOGY

## 2022-09-21 PROCEDURE — 3700000000 HC ANESTHESIA ATTENDED CARE: Performed by: UROLOGY

## 2022-09-21 PROCEDURE — 3600000013 HC SURGERY LEVEL 3 ADDTL 15MIN: Performed by: UROLOGY

## 2022-09-21 PROCEDURE — 7100000011 HC PHASE II RECOVERY - ADDTL 15 MIN: Performed by: UROLOGY

## 2022-09-21 PROCEDURE — 6360000004 HC RX CONTRAST MEDICATION: Performed by: UROLOGY

## 2022-09-21 PROCEDURE — 82365 CALCULUS SPECTROSCOPY: CPT

## 2022-09-21 PROCEDURE — 74420 UROGRAPHY RTRGR +-KUB: CPT

## 2022-09-21 PROCEDURE — 2580000003 HC RX 258: Performed by: NURSE ANESTHETIST, CERTIFIED REGISTERED

## 2022-09-21 PROCEDURE — 6360000002 HC RX W HCPCS: Performed by: NURSE PRACTITIONER

## 2022-09-21 PROCEDURE — 2720000010 HC SURG SUPPLY STERILE: Performed by: UROLOGY

## 2022-09-21 DEVICE — URETERAL STENT
Type: IMPLANTABLE DEVICE | Status: FUNCTIONAL
Brand: PERCUFLEX™

## 2022-09-21 RX ORDER — SODIUM CHLORIDE 0.9 % (FLUSH) 0.9 %
5-40 SYRINGE (ML) INJECTION PRN
Status: DISCONTINUED | OUTPATIENT
Start: 2022-09-21 | End: 2022-09-21 | Stop reason: HOSPADM

## 2022-09-21 RX ORDER — MIDAZOLAM HYDROCHLORIDE 1 MG/ML
INJECTION INTRAMUSCULAR; INTRAVENOUS PRN
Status: DISCONTINUED | OUTPATIENT
Start: 2022-09-21 | End: 2022-09-21 | Stop reason: SDUPTHER

## 2022-09-21 RX ORDER — TRAMADOL HYDROCHLORIDE 50 MG/1
100 TABLET ORAL PRN
Status: DISCONTINUED | OUTPATIENT
Start: 2022-09-21 | End: 2022-09-21 | Stop reason: HOSPADM

## 2022-09-21 RX ORDER — PROPOFOL 10 MG/ML
INJECTION, EMULSION INTRAVENOUS CONTINUOUS PRN
Status: DISCONTINUED | OUTPATIENT
Start: 2022-09-21 | End: 2022-09-21 | Stop reason: SDUPTHER

## 2022-09-21 RX ORDER — SODIUM CHLORIDE 9 MG/ML
INJECTION, SOLUTION INTRAVENOUS CONTINUOUS
Status: DISCONTINUED | OUTPATIENT
Start: 2022-09-21 | End: 2022-09-21 | Stop reason: HOSPADM

## 2022-09-21 RX ORDER — LIDOCAINE HYDROCHLORIDE 20 MG/ML
INJECTION, SOLUTION INFILTRATION; PERINEURAL PRN
Status: DISCONTINUED | OUTPATIENT
Start: 2022-09-21 | End: 2022-09-21 | Stop reason: SDUPTHER

## 2022-09-21 RX ORDER — SODIUM CHLORIDE 0.9 % (FLUSH) 0.9 %
5-40 SYRINGE (ML) INJECTION EVERY 12 HOURS SCHEDULED
Status: DISCONTINUED | OUTPATIENT
Start: 2022-09-21 | End: 2022-09-21 | Stop reason: HOSPADM

## 2022-09-21 RX ORDER — FENTANYL CITRATE 50 UG/ML
INJECTION, SOLUTION INTRAMUSCULAR; INTRAVENOUS PRN
Status: DISCONTINUED | OUTPATIENT
Start: 2022-09-21 | End: 2022-09-21 | Stop reason: SDUPTHER

## 2022-09-21 RX ORDER — SODIUM CHLORIDE 9 MG/ML
INJECTION, SOLUTION INTRAVENOUS CONTINUOUS PRN
Status: DISCONTINUED | OUTPATIENT
Start: 2022-09-21 | End: 2022-09-21 | Stop reason: SDUPTHER

## 2022-09-21 RX ORDER — SODIUM CHLORIDE 9 MG/ML
INJECTION, SOLUTION INTRAVENOUS PRN
Status: DISCONTINUED | OUTPATIENT
Start: 2022-09-21 | End: 2022-09-21 | Stop reason: HOSPADM

## 2022-09-21 RX ORDER — PROPOFOL 10 MG/ML
INJECTION, EMULSION INTRAVENOUS PRN
Status: DISCONTINUED | OUTPATIENT
Start: 2022-09-21 | End: 2022-09-21 | Stop reason: SDUPTHER

## 2022-09-21 RX ORDER — CEPHALEXIN 250 MG/1
250 CAPSULE ORAL 3 TIMES DAILY
Qty: 9 CAPSULE | Refills: 0 | Status: SHIPPED | OUTPATIENT
Start: 2022-09-21 | End: 2022-09-24

## 2022-09-21 RX ORDER — TRAMADOL HYDROCHLORIDE 50 MG/1
50 TABLET ORAL PRN
Status: DISCONTINUED | OUTPATIENT
Start: 2022-09-21 | End: 2022-09-21 | Stop reason: HOSPADM

## 2022-09-21 RX ADMIN — LIDOCAINE HYDROCHLORIDE 100 MG: 20 INJECTION, SOLUTION INFILTRATION; PERINEURAL at 14:23

## 2022-09-21 RX ADMIN — FENTANYL CITRATE 50 MCG: 50 INJECTION, SOLUTION INTRAMUSCULAR; INTRAVENOUS at 14:23

## 2022-09-21 RX ADMIN — PROPOFOL 60 MG: 10 INJECTION, EMULSION INTRAVENOUS at 14:23

## 2022-09-21 RX ADMIN — PROPOFOL 150 MCG/KG/MIN: 10 INJECTION, EMULSION INTRAVENOUS at 14:23

## 2022-09-21 RX ADMIN — FENTANYL CITRATE 50 MCG: 50 INJECTION, SOLUTION INTRAMUSCULAR; INTRAVENOUS at 14:28

## 2022-09-21 RX ADMIN — WATER 2000 MG: 1 INJECTION INTRAMUSCULAR; INTRAVENOUS; SUBCUTANEOUS at 14:14

## 2022-09-21 RX ADMIN — MIDAZOLAM 2 MG: 1 INJECTION INTRAMUSCULAR; INTRAVENOUS at 14:23

## 2022-09-21 RX ADMIN — SODIUM CHLORIDE: 9 INJECTION, SOLUTION INTRAVENOUS at 13:34

## 2022-09-21 NOTE — ANESTHESIA POSTPROCEDURE EVALUATION
Department of Anesthesiology  Postprocedure Note    Patient: Pavan Siddiqi  MRN: [id number]  YOB: 1960  Date of evaluation: 9/21/2022      Procedure Summary     Date: 09/21/22 Room / Location: [facility] OR 06 / [facility]    Anesthesia Start: 1414 Anesthesia Stop: 1449    Procedure: CYSTOSCOPY RETROGRADE PYELOGRAM URETEROSCOPY J STENT  RIGHT   (HOLD TIME 1400) (Right) Diagnosis:       Uric acid nephrolithiasis      (Uric acid nephrolithiasis [N20.0])    Surgeons: Rob Pritchard MD Responsible Provider: Jimmy Del Castillo MD    Anesthesia Type: MAC ASA Status: 3          Anesthesia Type: No value filed.     Shankar Phase I: Shankar Score: 10    Shankar Phase II:        Anesthesia Post Evaluation    Patient location during evaluation: PACU  Patient participation: complete - patient participated  Level of consciousness: awake and alert  Pain score: 1  Airway patency: patent  Nausea & Vomiting: no nausea and no vomiting  Complications: no  Cardiovascular status: hemodynamically stable  Respiratory status: acceptable

## 2022-09-21 NOTE — ANESTHESIA PRE PROCEDURE
Department of Anesthesiology  Preprocedure Note       Name:  Los Bustos   Age:  64 y.o.  :  1960                                          MRN:  [id number]         Date:  2022      Surgeon: Drew Parra):  Damir Landry MD    Procedure: Procedure(s):  CYSTOSCOPY RETROGRADE PYELOGRAM URETEROSCOPY J STENT LASER LITHOTRIPSY RIGHT   (HOLD TIME 1400)    Medications prior to admission:   Prior to Admission medications    Medication Sig Start Date End Date Taking? Authorizing Provider   Insulin Pen Needle 32G X 4 MM MISC 1 each by Does not apply route daily 22   Rocio Gomez APRN - NP   glimepiride (AMARYL) 2 MG tablet TAKE ONE TABLET BY MOUTH DAILY WITH BREAKFAST 5/10/22   Mayo Emmanuel MD   metFORMIN (GLUCOPHAGE-XR) 750 MG extended release tablet TAKE ONE TABLET BY MOUTH TWO TIMES A DAY 22  Mayo Emmanuel MD   blood glucose monitor strips One-Touch Ultra strips.  Check DAILY 22   Mayo Emmanuel MD   Semaglutide [facility]) 14 MG TABS Take 1 tablet daily 22   CARLEY Deras - CNS   carvedilol (COREG) 12.5 MG tablet TAKE ONE TABLET BY MOUTH TWICE A DAY WITH MEALS 22   Historical Provider, MD   SENSIPAR 30 MG tablet Take 30 mg by mouth daily 22   Historical Provider, MD   insulin glargine (LANTUS SOLOSTAR) 100 UNIT/ML injection pen Inject 14 Units into the skin nightly 22   Bruce D Jola Snellen, DO   doxazosin (CARDURA) 2 MG tablet TAKE ONE TABLET BY MOUTH AT BEDTIME 20   Historical Provider, MD   spironolactone (ALDACTONE) 25 MG tablet TAKE ONE TABLET BY MOUTH DAILY 3/19/19 8/24/22  Patrick Rabago DO   ONE TOUCH ULTRA TEST strip USE 1 TWICE DAILY 3/17/18   Historical Provider, MD   Blood Glucose Monitoring Suppl (ONE TOUCH ULTRA 2) w/Device KIT use as directed 18   Historical Provider, MD   ONE TOUCH LANCETS MISC 1 each by Does not apply route 2 times daily Please dispense brand/quantity lancets need for patient's testing device 2/19/18   Patrick Rabago,    vitamin D (ERGOCALCIFEROL) 30987 UNITS CAPS capsule Take 50,000 Units by mouth every 14 days     Historical Provider, MD       Current medications:    No current facility-administered medications for this visit. No current outpatient medications on file. Facility-Administered Medications Ordered in Other Visits   Medication Dose Route Frequency Provider Last Rate Last Admin    0.9 % sodium chloride infusion   IntraVENous Continuous Prettylewis Breauxk, APRN - CNP        sodium chloride flush 0.9 % injection 5-40 mL  5-40 mL IntraVENous 2 times per day Pretty A Namitak, APRN - CNP        sodium chloride flush 0.9 % injection 5-40 mL  5-40 mL IntraVENous PRN Pretty A Namitak, APRN - CNP        0.9 % sodium chloride infusion   IntraVENous PRN Pretty Breauxk APRN - CNP        ceFAZolin (ANCEF) 2,000 mg in sterile water 20 mL IV syringe  2,000 mg IntraVENous On [facility] [address] [facility]           Allergies:     Allergies   Allergen Reactions    Trulicity [Dulaglutide] Nausea Only and Other (See Comments)     Severe GI issues       Problem List:    Patient Active Problem List   Diagnosis Code    Class 3 severe obesity due to excess calories with serious comorbidity and body mass index (BMI) of 45.0 to 49.9 in adult ([facility]) E66.01, Z68.42    Type 2 diabetes mellitus with stage 2 chronic kidney disease, without long-term current use of insulin ([facility]) E11.22, N18.2    Essential hypertension I10    Hypercalcemia E83.52    Sepsis ([facility] [address] A41.9    Acute on chronic renal insufficiency N28.9, N18.9    Hyperkalemia E87.5    Renal calculus, right N20.0    Renal calculi N20.0       Past Medical History:        Diagnosis Date    Arthritis     CHF (congestive heart failure) ([facility] [address] 2018 9/20/2022 @ 0919: pt states echo at Dr Dre Navarro office / also [facility] 2018 - - patient denies recent issues related to CHF    Chronic kidney disease     Diabetes mellitus ([facility] [address]     Hyperparathyroidism ([facility] [address]     Hypertension     Kidney stone        Past Surgical History:        Procedure Laterality Date    BLADDER SURGERY Right 08/22/2022    CYSTOSCOPY RETROGRADE PYELOGRAM STENT INSERTION performed by Oliver Barr MD at [address] [facility] [address] Left 04/02/2013    benign    HYSTERECTOMY (CERVIX STATUS UNKNOWN)      OVARIAN CYST REMOVAL  12/05/2008    left    SUREKHA AND BSO (CERVIX REMOVED)      TONSILLECTOMY         Social History:    Social History     Tobacco Use    Smoking status: Never    Smokeless tobacco: Never   Substance Use Topics    Alcohol use: No                                Counseling given: Not Answered      Vital Signs (Current): There were no vitals filed for this visit.                                            BP Readings from Last 3 Encounters:   09/21/22 (!) 140/89   08/25/22 (!) 163/99   08/24/22 (!) 152/78       NPO Status:  > 8 hours                                                                               BMI:   Wt Readings from Last 3 Encounters:   09/15/22 297 lb (134.7 kg)   08/25/22 (!) 303 lb (137.4 kg)   08/24/22 297 lb (134.7 kg)     There is no height or weight on file to calculate BMI.    CBC:   Lab Results   Component Value Date/Time    WBC 22.3 08/24/2022 03:38 AM    RBC 3.52 08/24/2022 03:38 AM    HGB 10.3 08/24/2022 03:38 AM    HCT 30.9 08/24/2022 03:38 AM    MCV 87.8 08/24/2022 03:38 AM    RDW 12.4 08/24/2022 03:38 AM     08/24/2022 03:38 AM       CMP:   Lab Results   Component Value Date/Time     08/24/2022 03:38 AM    K 4.4 08/24/2022 03:38 AM    CL 99 08/24/2022 03:38 AM    CO2 22 08/24/2022 03:38 AM    BUN 29 08/24/2022 03:38 AM    CREATININE 1.4 08/24/2022 03:38 AM    GFRAA 46 08/24/2022 03:38 AM    LABGLOM 46 08/24/2022 03:38 AM    GLUCOSE 252 08/24/2022 03:38 AM    GLUCOSE 77 12/07/2010 03:00 PM    PROT 6.6 08/24/2022 03:38 AM    CALCIUM 9.5 08/24/2022 03:38 AM    BILITOT 0.3 08/24/2022 03:38 AM ALKPHOS 50 08/24/2022 03:38 AM    AST 11 08/24/2022 03:38 AM    ALT 12 08/24/2022 03:38 AM       POC Tests: No results for input(s): POCGLU, POCNA, POCK, POCCL, POCBUN, POCHEMO, POCHCT in the last 72 hours. Coags:   Lab Results   Component Value Date/Time    PROTIME 14.5 08/23/2022 06:45 AM    INR 1.3 08/23/2022 06:45 AM    APTT 32.4 08/23/2022 06:45 AM       HCG (If Applicable): No results found for: PREGTESTUR, PREGSERUM, HCG, HCGQUANT     ABGs: No results found for: PHART, PO2ART, VXU4BEZ, WCV6DLW, BEART, V0NIVGVP     Type & Screen (If Applicable):  No results found for: LABABO, LABRH    Drug/Infectious Status (If Applicable):  No results found for: HIV, HEPCAB    COVID-19 Screening (If Applicable): No results found for: COVID19        Anesthesia Evaluation  Patient summary reviewed no history of anesthetic complications:   Airway: Mallampati: III  TM distance: >3 FB   Neck ROM: full  Mouth opening: > = 3 FB   Dental: normal exam         Pulmonary:Negative Pulmonary ROS breath sounds clear to auscultation                             Cardiovascular:    (+) hypertension:, CHF:,         Rhythm: regular             Beta Blocker:  Dose within 24 Hrs         Neuro/Psych:   Negative Neuro/Psych ROS              GI/Hepatic/Renal:   (+) renal disease: kidney stones, morbid obesity          Endo/Other:    (+) DiabetesType II DM, using insulin, . ROS comment: Sepsis   Hyperkalemia treated in ER Abdominal:             Vascular: Other Findings:             Anesthesia Plan      MAC     ASA 3       Induction: intravenous. Anesthetic plan and risks discussed with patient. Plan discussed with CRNA.                     Rl Pretty MD  September 21, 2022  2:11 PM

## 2022-09-21 NOTE — OP NOTE
Operative Note      Patient: Nory Perez  YOB: 1960  MRN: [id number]    Date of Procedure: 9/21/2022    Pre-Op Diagnosis: Left distal ureteral stone    Post-Op Diagnosis: Same       Procedure: Cystoscopy, retrograde pyelogram, left ureteroscopy, stone basket extraction, stent insertion with string    Surgeon(s):  Michael Broderick MD    Assistant:   * No surgical staff found *    Anesthesia: Monitor Anesthesia Care    Estimated Blood Loss (mL): Minimal    Complications: None    Specimens:   ID Type Source Tests Collected by Time Destination   A : RIGHT URETERAL STONE Stone (Calculus) [address] Michael Broderick MD 9/21/2022 1439        Implants:  Implant Name Type Inv. Item Serial No.  Lot No. LRB No. Used Action   [facility] PERCFLX FIRM DUROMETER DBL [employer] [id number]  [facility] PERCFLX FIRM DUROMETER DBL [employer] [id number] Right 1 Implanted         Drains:   Ureteral Drain/Stent 09/21/22 Right Ureter (Active)       [REMOVED] Ureteral Drain/Stent 08/22/22 Right Ureter (Removed)       [REMOVED] Urinary Catheter 08/22/22 Stern (Removed)   Catheter Indications Urinary retention (acute or chronic), continuous bladder irrigation or bladder outlet obstruction; Need for fluid volume management of the critically ill patient in a critical care setting 08/23/22 0753   Site Assessment No urethral drainage 08/23/22 0753   Urine Color Pink 08/23/22 0753   Urine Appearance Sediment 08/23/22 0753   Collection Container Standard 08/23/22 0753   Securement Method Leg strap 08/23/22 0753   Catheter Care Completed Yes 08/23/22 0753   Catheter Best Practices  Catheter secured to thigh; Bag below bladder;Bag not on floor; Lack of dependent loop in tubing;Drainage bag less than half full 08/23/22 0753   Status Draining 08/23/22 0753   Output (mL) 300 mL 08/23/22 7891         INDICATIONS FOR PROCEDURE: Nory Perez a 64 y.o. with a history of nephrolithiasis. The patient has a left ureteral calculus and is having severe pain. The patient presents for ureteroscopy, laser lithotripsy, understands the risks, benefits and alternatives of the procedure, signed informed consent, and agreed to proceed. DESCRIPTION OF PROCEDURE: The patient was brought into the operating room and placed under anesthesia in the dorsal lithotomy position. The patient was prepped and draped in a sterile fashion. A 21-Ivorian cystoscope with a 30-degree lens was passed into the bladder. The entire urethra was examined and found to be within normal limits. 30- degree endoscopy was utilized to inspect the entire bladder mucosal surface. There was no evidence of tumors, calculi, diverticula, or other abnormalities throughout the entire bladder. The ureteral orifices were normal in size, number, and location. The entire cystoscopic procedure was within normal limits. The bladder was drained. A 5-Ivorian open-ended catheter was passed into the left ureteral orifice and a small amount of contrast was injected into the ureter outlining a stone in the distal ureter. A wire was passed beyond the stone into the renal pelvis under fluoroscopic guidance. A semirigid ureteroscope was passed into the left ureter and a stone was identified within the ureter. The stone was fragmented with a 200-micron holmium laser fiber into three pieces. Each piece was removed with the [address], with some minor trauma to the ureter. The remainder of the ureter was examined and found to be free of stones. A 4.8 x 22 double-J ureteral stent was passed over the wire, with good curl seen in the kidney as well as in the bladder. A string was left attached to the stent and taped to the patient's mons pubis. The bladder was drained. The patient was awakened from anesthesia and taken to recovery in stable condition. PLAN: The patient is stone-free on the left side.  An indwelling stent was left in the patient will remove the string stent in the next few days.       Electronically signed by Paty Anderson MD on 9/21/2022 at 3:53 PM

## 2022-09-21 NOTE — H&P
Subhash Weber is a 64 y.o. female with a ureteral stone and sepsis of urinary origin. Past Medical History:   Diagnosis Date    Arthritis     CHF (congestive heart failure) ([facility] [address] 2018 9/20/2022 @ 0919: pt states echo at Dr Annita Longoria office / also Stress test in [facility] 2018 - - patient denies recent issues related to CHF    Chronic kidney disease     Diabetes mellitus ([facility] [address]     Hyperparathyroidism ([facility] [address]     Hypertension     Kidney stone        Past Surgical History:   Procedure Laterality Date    BLADDER SURGERY Right 08/22/2022    CYSTOSCOPY RETROGRADE PYELOGRAM STENT INSERTION performed by Laila Lincoln MD at [address] [facility] Left 04/02/2013    benign    HYSTERECTOMY (CERVIX STATUS UNKNOWN)      OVARIAN CYST REMOVAL  12/05/2008    left    SUREKHA AND BSO (CERVIX REMOVED)      TONSILLECTOMY         Family History   Problem Relation Age of Onset    Lung Cancer Maternal Uncle     Cancer Maternal Aunt         Thymus       Prior to Admission medications    Medication Sig Start Date End Date Taking? Authorizing Provider   Insulin Pen Needle 32G X 4 MM MISC 1 each by Does not apply route daily 6/23/22   Evan Gomez APRN - NP   glimepiride (AMARYL) 2 MG tablet TAKE ONE TABLET BY MOUTH DAILY WITH BREAKFAST 5/10/22   Craig Sharma MD   metFORMIN (GLUCOPHAGE-XR) 750 MG extended release tablet TAKE ONE TABLET BY MOUTH TWO TIMES A DAY 5/2/22 8/24/22  Craig Sharma MD   blood glucose monitor strips One-Touch Ultra strips.  Check DAILY 4/20/22   Craig Sharma MD   Semaglutide [facility]) 14 MG TABS Take 1 tablet daily 4/12/22   CARLEY Fregoso - CNS   carvedilol (COREG) 12.5 MG tablet TAKE ONE TABLET BY MOUTH TWICE A DAY WITH MEALS 1/17/22   Historical Provider, MD   SENSIPAR 30 MG tablet Take 30 mg by mouth daily 1/20/22   Historical Provider, MD   insulin glargine (LANTUS SOLOSTAR) 100 UNIT/ML injection pen Inject 14 Units into the skin nightly 2/16/22   Prema Llily DO doxazosin (CARDURA) 2 MG tablet TAKE ONE TABLET BY MOUTH AT BEDTIME 8/8/20   Historical Provider, MD   spironolactone (ALDACTONE) 25 MG tablet TAKE ONE TABLET BY MOUTH DAILY 3/19/19 8/24/22  Patrick Rabago DO   ONE TOUCH ULTRA TEST strip USE 1 TWICE DAILY 3/17/18   Historical Provider, MD   Blood Glucose Monitoring Suppl (ONE TOUCH ULTRA 2) w/Device KIT use as directed 2/19/18   Historical Provider, MD   ONE TOUCH LANCETS MISC 1 each by Does not apply route 2 times daily Please dispense brand/quantity lancets need for patient's testing device 2/19/18   Patrick Rabago,    vitamin D (ERGOCALCIFEROL) 11449 UNITS CAPS capsule Take 50,000 Units by mouth every 14 days     Historical Provider, MD        Allergies: Trulicity [dulaglutide]    Social History     Tobacco Use    Smoking status: Never    Smokeless tobacco: Never   Substance Use Topics    Alcohol use: No        Review of Systems:  Respiratory: negative for cough and hemoptysis  Cardiovascular: negative for chest pain and dyspnea  Gastrointestinal: negative for abdominal pain, diarrhea, nausea and vomiting  Genitourinary: as per HPI, otherwise negative. Derm: negative for rash and skin lesion(s)  Neurological: negative for seizures and tremors  Endocrine: negative for diabetic symptoms including polydipsia and polyuria  All other systems negative    Physical Exam:  Vitals:    09/21/22 1339   BP: (!) 140/89   Pulse: 76   Resp: 16   Temp: 97.6 °F (36.4 °C)   SpO2: 96%      Skin:  Warm and dry.   No rash or bruises  HEENT:  PERRLA, EOMI  Neck:  No JVD, No thyromegaly  Cardiac:  RRR  Lungs:  No audible wheezes, symmetric respirations, non-labored  Abdomen: Soft, non-tender, non-distended  Extremities:  No clubbing, edema or cyanosis  Neurological:  Moves all extremities, normal DTR    Lab Results   Component Value Date    WBC 22.3 (H) 08/24/2022    HGB 10.3 (L) 08/24/2022    HCT 30.9 (L) 08/24/2022    MCV 87.8 08/24/2022     08/24/2022       Lab Results Component Value Date    CREATININE 1.4 (H) 08/24/2022       No results found for: PSA    No results for input(s): LABURIN in the last 72 hours. No results for input(s): BC in the last 72 hours. No results for input(s): Volanda Gin in the last 72 hours. Assessment and Plan:  1. To OR for Cystoscopy, Retrograde Pyelograms, Ureteroscopy, Laser Lithotripsy, Stone Extraction, Stent Placement.

## 2022-09-21 NOTE — OP NOTE
Operative Note      Patient: Miguelangel Hilliard  YOB: 1960  MRN: [id number]    Date of Procedure: 9/21/2022    Pre-Op Diagnosis: Right ureteral stone, history of sepsis    Post-Op Diagnosis: Same       Procedure: Cystoscopy, retrograde pyelogram, right ureteroscopy, stone extraction, stent exchange    Surgeon(s):  Paty Anderson MD    Assistant:   * No surgical staff found *    Anesthesia: Monitor Anesthesia Care    Estimated Blood Loss (mL): Minimal    Complications: None    Specimens:   ID Type Source Tests Collected by Time Destination   A : RIGHT URETERAL STONE Stone (Calculus) [address] Paty Anderson MD 9/21/2022 1439        Implants:  Implant Name Type Inv. Item Serial No.  Lot No. LRB No. Used Action   [facility] PERCFLX FIRM DUROMETER DBL Patti Quinones [id number]  [facility] PERCFLX FIRM DUROMETER DBL [employer]WD [id number] Right 1 Implanted         Drains:   Ureteral Drain/Stent 09/21/22 Right Ureter (Active)       [REMOVED] Ureteral Drain/Stent 08/22/22 Right Ureter (Removed)       [REMOVED] Urinary Catheter 08/22/22 Stern (Removed)   Catheter Indications Urinary retention (acute or chronic), continuous bladder irrigation or bladder outlet obstruction; Need for fluid volume management of the critically ill patient in a critical care setting 08/23/22 0753   Site Assessment No urethral drainage 08/23/22 0753   Urine Color Pink 08/23/22 0753   Urine Appearance Sediment 08/23/22 0753   Collection Container Standard 08/23/22 0753   Securement Method Leg strap 08/23/22 0753   Catheter Care Completed Yes 08/23/22 0753   Catheter Best Practices  Catheter secured to thigh; Bag below bladder;Bag not on floor; Lack of dependent loop in tubing;Drainage bag less than half full 08/23/22 0753   Status Draining 08/23/22 0753   Output (mL) 300 mL 08/23/22 7750         INDICATIONS FOR PROCEDURE: Miguelangel Hilliard is a 64 y.o. with a history of nephrolithiasis. The patient has a right ureteral calculus and has not passed this stone. She underwent stent insertion for sepsis and today presents for ureteroscopy, laser lithotripsy, understands the risks, benefits and alternatives of the procedure, signed informed consent, and agreed to proceed. DESCRIPTION OF PROCEDURE: The patient was brought into the operating room and placed under anesthesia in the dorsal lithotomy position. The patient was prepped and draped in a sterile fashion. A 21-Sri Lankan cystoscope with a 30-degree lens was passed into the bladder. The entire urethra was examined and found to be within normal limits. 30- degree endoscopy was utilized to inspect the entire bladder mucosal surface. There was no evidence of tumors, calculi, diverticula, or other abnormalities throughout the entire bladder. The ureteral orifices were normal in size, number, and location. The entire cystoscopic procedure was within normal limits. The bladder was drained. The stent from the right ureteral orifice was grasped and delivered to the urethral meatus. A wire was passed through the stent into the renal pelvis under fluoroscopic guidance. An additional wire was left indwelling. Semirigid ureteroscope was placed into the ureter and the entire ureter was examined found to be free of stone. A 12 x 28 ureteral access sheath was then placed into the proximal ureter. A flexible ureteroscope was passed through the sheath and into the renal pelvis. All calyces were examined and there was a stone in the lower pole calyx which was grasped and delivered into the ureter. This was removed through the ureter without difficulty and sent to pathology for calculus analysis. A 4.8 x 26 double-J ureteral stent was passed over the wire, with good curl seen in the kidney as well as in the bladder. The bladder was drained. The patient was awakened from anesthesia and taken to recovery in stable condition. PLAN: The patient is stone-free on the right side.  An indwelling stent was left indwelling and the patient will follow up in the office in 1 week for stent removal.    Michael Dale MD  9/21/2022  3:51 PM      Electronically signed by Michael Dale MD on 9/21/2022 at 3:50 PM

## 2022-09-27 DIAGNOSIS — E11.65 UNCONTROLLED TYPE 2 DIABETES MELLITUS WITH HYPERGLYCEMIA (HCC): ICD-10-CM

## 2022-09-27 RX ORDER — INSULIN GLARGINE 100 [IU]/ML
14 INJECTION, SOLUTION SUBCUTANEOUS NIGHTLY
Qty: 15 ML | Refills: 5 | Status: SHIPPED | OUTPATIENT
Start: 2022-09-27

## 2022-09-29 LAB
CALCULI COMPOSITION: NORMAL
MASS: 105 MG
STONE DESCRIPTION: NORMAL

## 2022-10-10 DIAGNOSIS — E11.65 UNCONTROLLED TYPE 2 DIABETES MELLITUS WITH HYPERGLYCEMIA (HCC): Primary | ICD-10-CM

## 2022-10-10 RX ORDER — ORAL SEMAGLUTIDE 14 MG/1
TABLET ORAL
Qty: 30 TABLET | Refills: 5 | Status: SHIPPED | OUTPATIENT
Start: 2022-10-10

## 2022-12-14 PROBLEM — N18.9 ACUTE ON CHRONIC RENAL INSUFFICIENCY: Status: RESOLVED | Noted: 2022-08-22 | Resolved: 2022-12-14

## 2022-12-14 PROBLEM — N20.0 RENAL CALCULI: Status: RESOLVED | Noted: 2022-08-22 | Resolved: 2022-12-14

## 2022-12-14 PROBLEM — N20.0 RENAL CALCULUS, RIGHT: Status: RESOLVED | Noted: 2022-08-22 | Resolved: 2022-12-14

## 2022-12-14 PROBLEM — E87.5 HYPERKALEMIA: Status: RESOLVED | Noted: 2022-08-22 | Resolved: 2022-12-14

## 2022-12-14 PROBLEM — N28.9 ACUTE ON CHRONIC RENAL INSUFFICIENCY: Status: RESOLVED | Noted: 2022-08-22 | Resolved: 2022-12-14

## 2022-12-14 PROBLEM — A41.9 SEPSIS (HCC): Status: RESOLVED | Noted: 2022-08-22 | Resolved: 2022-12-14

## 2022-12-16 DIAGNOSIS — E11.22 TYPE 2 DIABETES MELLITUS WITH STAGE 2 CHRONIC KIDNEY DISEASE, WITHOUT LONG-TERM CURRENT USE OF INSULIN (HCC): Primary | ICD-10-CM

## 2022-12-16 DIAGNOSIS — E11.65 UNCONTROLLED TYPE 2 DIABETES MELLITUS WITH HYPERGLYCEMIA (HCC): ICD-10-CM

## 2022-12-16 DIAGNOSIS — N18.2 TYPE 2 DIABETES MELLITUS WITH STAGE 2 CHRONIC KIDNEY DISEASE, WITHOUT LONG-TERM CURRENT USE OF INSULIN (HCC): Primary | ICD-10-CM

## 2022-12-16 RX ORDER — METFORMIN HYDROCHLORIDE 750 MG/1
TABLET, EXTENDED RELEASE ORAL
Qty: 60 TABLET | Refills: 3 | Status: SHIPPED | OUTPATIENT
Start: 2022-12-16

## 2022-12-16 NOTE — PROGRESS NOTES
I called and spoke with Marti Payne that I have sent in a refill on her Metformin. I also was checking that she was taking it again. She said she started taking it after her surgery in August. Looking back at notes she was put back on it after her surgery.

## 2022-12-28 ENCOUNTER — HOSPITAL ENCOUNTER (OUTPATIENT)
Dept: GENERAL RADIOLOGY | Age: 62
Discharge: HOME | End: 2022-12-30
Payer: MEDICAID

## 2022-12-28 ENCOUNTER — HOSPITAL ENCOUNTER (OUTPATIENT)
Age: 62
Discharge: HOME | End: 2022-12-28
Payer: MEDICAID

## 2022-12-28 DIAGNOSIS — Z12.31 ENCOUNTER FOR SCREENING MAMMOGRAM FOR MALIGNANT NEOPLASM OF BREAST: ICD-10-CM

## 2022-12-28 LAB — IGA: 238 MG/DL (ref 70–400)

## 2022-12-28 PROCEDURE — 36415 COLL VENOUS BLD VENIPUNCTURE: CPT

## 2022-12-28 PROCEDURE — 77067 SCR MAMMO BI INCL CAD: CPT

## 2022-12-28 PROCEDURE — 83516 IMMUNOASSAY NONANTIBODY: CPT

## 2022-12-28 PROCEDURE — 82784 ASSAY IGA/IGD/IGG/IGM EACH: CPT

## 2023-01-23 ENCOUNTER — OFFICE VISIT (OUTPATIENT)
Dept: ENT CLINIC | Age: 63
End: 2023-01-23
Payer: MEDICAID

## 2023-01-23 VITALS — HEIGHT: 66 IN | BODY MASS INDEX: 47.09 KG/M2 | WEIGHT: 293 LBS

## 2023-01-23 DIAGNOSIS — S19.83XD: Primary | ICD-10-CM

## 2023-01-23 PROCEDURE — 1036F TOBACCO NON-USER: CPT | Performed by: OTOLARYNGOLOGY

## 2023-01-23 PROCEDURE — 99204 OFFICE O/P NEW MOD 45 MIN: CPT | Performed by: OTOLARYNGOLOGY

## 2023-01-23 PROCEDURE — G8427 DOCREV CUR MEDS BY ELIG CLIN: HCPCS | Performed by: OTOLARYNGOLOGY

## 2023-01-23 PROCEDURE — 3017F COLORECTAL CA SCREEN DOC REV: CPT | Performed by: OTOLARYNGOLOGY

## 2023-01-23 PROCEDURE — G8484 FLU IMMUNIZE NO ADMIN: HCPCS | Performed by: OTOLARYNGOLOGY

## 2023-01-23 PROCEDURE — G8417 CALC BMI ABV UP PARAM F/U: HCPCS | Performed by: OTOLARYNGOLOGY

## 2023-01-23 RX ORDER — FLUTICASONE PROPIONATE 50 MCG
2 SPRAY, SUSPENSION (ML) NASAL DAILY
Qty: 16 G | Refills: 3 | Status: SHIPPED | OUTPATIENT
Start: 2023-01-23

## 2023-01-23 ASSESSMENT — ENCOUNTER SYMPTOMS
RHINORRHEA: 0
COLOR CHANGE: 0
SORE THROAT: 0
STRIDOR: 0
VOICE CHANGE: 1
TROUBLE SWALLOWING: 0
SINUS PAIN: 0
SINUS PRESSURE: 0
COUGH: 0
GASTROINTESTINAL NEGATIVE: 1
WHEEZING: 0
CHOKING: 0

## 2023-01-23 ASSESSMENT — VISUAL ACUITY: OU: 1

## 2023-01-23 NOTE — PROGRESS NOTES
[facility] Otolaryngology  Dr. Johnathon Cherry D.O. Ms.Ed. New Consult       Patient Name:  Bailey Mejia  :  1960     CHIEF C/O:    Chief Complaint   Patient presents with    New Patient     Hoarseness, started w/frequent episodes of vomiting due to kidney stone/infection,        HISTORY OBTAINED FROM:  patient    HISTORY OF PRESENT ILLNESS:       Arturo Vargas is a 58y.o. year old female, here today for hoarseness. Her symptoms began in August after several episodes of vomiting caused hoarseness, sore throat and globus. Her symptoms have since improved but the hoarseness has persisted. She is a baires but has never experienced similar symptoms in the past. She denies any recent intubations or surgeries. Denies any history of tobacco or alcohol use. No history of head or neck surgery other then tonsillectomy. Denies any weight loss or trouble swallowing. Denies any GERD symptoms. She does have a history of congestion.        Past Medical History:   Diagnosis Date    Arthritis     CHF (congestive heart failure) ([facility] [address] 2018    2022 @ 0919: pt states echo at Dr Laura Lind office / also Stress test in [facility] 2018 - - patient denies recent issues related to CHF    Chronic kidney disease     Diabetes mellitus ([facility] [address]     Hyperparathyroidism ([facility] [address]     Hypertension     Kidney stone      Past Surgical History:   Procedure Laterality Date    BLADDER SURGERY Right 2022    CYSTOSCOPY RETROGRADE PYELOGRAM STENT INSERTION performed by Karly Piña MD at [address] Left 2013    benign    HYSTERECTOMY (CERVIX STATUS UNKNOWN)      LITHOTRIPSY Right 2022    CYSTOSCOPY RETROGRADE PYELOGRAM URETEROSCOPY J STENT  RIGHT   (HOLD TIME 1400) performed by Karly Piña MD at [address]  2008    left    SUREKHA AND BSO (CERVIX REMOVED)      TONSILLECTOMY         Current Outpatient Medications:     Lancets (ONETOUCH DELICA PLUS [id number]) MISC, USE TO TEST TWICE DAILY, Disp: 100 each, Rfl: 5    metFORMIN (GLUCOPHAGE-XR) 750 MG extended release tablet, TAKE ONE TABLET BY MOUTH TWO TIMES A DAY, Disp: 60 tablet, Rfl: 3    cinacalcet (SENSIPAR) 60 MG tablet, TAKE ONE TABLET BY MOUTH ONCE DAILY, Disp: , Rfl:     carvedilol (COREG) 25 MG tablet, TAKE ONE TABLET BY MOUTH IN THE MORNING AND ONE TABLET IN THE EVENING, Disp: , Rfl:     spironolactone (ALDACTONE) 25 MG tablet, TAKE ONE TABLET BY MOUTH ONE TIME EACH DAY, Disp: , Rfl:     Semaglutide (RYBELSUS) 14 MG TABS, Take 1 tablet daily, Disp: 30 tablet, Rfl: 5    insulin glargine (LANTUS SOLOSTAR) 100 UNIT/ML injection pen, Inject 14 Units into the skin nightly, Disp: 15 mL, Rfl: 5    Insulin Pen Needle 32G X 4 MM MISC, 1 each by Does not apply route daily, Disp: 100 each, Rfl: 6    glimepiride (AMARYL) 2 MG tablet, TAKE ONE TABLET BY MOUTH DAILY WITH BREAKFAST, Disp: 90 tablet, Rfl: 3    blood glucose monitor strips, One-Touch Ultra strips. Check DAILY, Disp: 100 strip, Rfl: 3    doxazosin (CARDURA) 2 MG tablet, TAKE ONE TABLET BY MOUTH AT BEDTIME, Disp: , Rfl:     ONE TOUCH ULTRA TEST strip, USE 1 TWICE DAILY, Disp: , Rfl: 3    Blood Glucose Monitoring Suppl (ONE TOUCH ULTRA 2) w/Device KIT, use as directed, Disp: , Rfl: 0    vitamin D (ERGOCALCIFEROL) 23596 UNITS CAPS capsule, Take 50,000 Units by mouth every 14 days , Disp: , Rfl:   Trulicity [dulaglutide]  Social History     Tobacco Use    Smoking status: Never    Smokeless tobacco: Never   Vaping Use    Vaping Use: Never used   Substance Use Topics    Alcohol use: No    Drug use: No     Family History   Problem Relation Age of Onset    Lung Cancer Maternal Uncle     Cancer Maternal Aunt         Thymus       Review of Systems   Constitutional:  Negative for activity change, fatigue, fever and unexpected weight change. HENT:  Positive for congestion, postnasal drip and voice change.  Negative for ear discharge, ear pain, hearing loss, nosebleeds, rhinorrhea, sinus pressure, sinus pain, sore throat, tinnitus and trouble swallowing. Respiratory:  Negative for cough, choking, wheezing and stridor. Cardiovascular:  Negative for chest pain. Gastrointestinal: Negative. Genitourinary: Negative. Skin:  Negative for color change and rash. Neurological:  Negative for speech difficulty, light-headedness, numbness and headaches. Hematological:  Negative for adenopathy. Psychiatric/Behavioral:  Negative for behavioral problems. Ht 5' 6\" (1.676 m)   Wt 294 lb (133.4 kg)   BMI 47.45 kg/m²   Physical Exam  Constitutional:       Appearance: She is obese. HENT:      Head: Normocephalic and atraumatic. Right Ear: Tympanic membrane, ear canal and external ear normal. No drainage. Left Ear: Tympanic membrane, ear canal and external ear normal. No drainage. Nose: Congestion and rhinorrhea present. No nasal deformity or septal deviation. Mouth/Throat:      Mouth: Mucous membranes are moist.   Eyes:      General: Lids are normal. Vision grossly intact. Extraocular Movements: Extraocular movements intact. Conjunctiva/sclera: Conjunctivae normal.      Pupils: Pupils are equal, round, and reactive to light. Cardiovascular:      Rate and Rhythm: Normal rate. Pulmonary:      Effort: Pulmonary effort is normal.   Musculoskeletal:         General: Normal range of motion. Cervical back: Normal range of motion. Lymphadenopathy:      Cervical: No cervical adenopathy. Skin:     Capillary Refill: Capillary refill takes less than 2 seconds. Neurological:      Mental Status: She is alert.    Psychiatric:         Mood and Affect: Mood normal.     Endoscopy Procedure Note    Pre-operative Diagnosis: hoarseness  Post-operative Diagnosis: same  Indications: Hoarseness, dysphagia or aspiration - not able to be clearly evaluated by indirect laryngoscopy  Anesthesia: Lidocaine 2% and Hany-Synephrine 1/2%  Endoscopy Type:  Flexible nasopharyngolaryngoscopy  Procedure Details   The flexible nasopharyngolaryngoscope was passed through the left side(s) of the nose, and the nose, nasopharynx, oropharynx, hypopharynx and larynx were examined. Examination was performed during quiet respiration and with phonation. The following findings were noted. Findings:  Normal nasopharynx, normal epiglottis, normal tongue base, normal pyriform, normal TVC motion, no subglottic masses or lesions. Mild swelling to the mid-portion of the right TVC with minimal erythema. Mild posterior cricoid erythema. Condition:  Stable  Complications:  None           IMPRESSION/PLAN:  Right TVC contusion  Allergic rhinitis- start Flonase    Dr. Samantha Hester.  Otolaryngology Facial Plastic Surgery  :[facility] Otolaryngology/Facial Plastic Surgery Residency  Associate Clinical Professor:  Chanda Walker, [employer]  1960      I have discussed the case, including pertinent history and exam findings with the resident. I have seen and examined the patient and the key elements of the encounter have been performed by me. I agree with the assessment, plan and orders as documented by the resident. Patient here for follow up of medical problems. Remainder of medical problems as per resident note.       [address], DO  1/24/23

## 2023-04-17 ENCOUNTER — OFFICE VISIT (OUTPATIENT)
Dept: ENT CLINIC | Age: 63
End: 2023-04-17
Payer: MEDICAID

## 2023-04-17 VITALS
HEART RATE: 81 BPM | DIASTOLIC BLOOD PRESSURE: 92 MMHG | HEIGHT: 66 IN | SYSTOLIC BLOOD PRESSURE: 138 MMHG | RESPIRATION RATE: 14 BRPM | WEIGHT: 293 LBS | BODY MASS INDEX: 47.09 KG/M2

## 2023-04-17 DIAGNOSIS — R49.0 HOARSENESS: ICD-10-CM

## 2023-04-17 DIAGNOSIS — J30.2 SEASONAL ALLERGIC RHINITIS, UNSPECIFIED TRIGGER: ICD-10-CM

## 2023-04-17 DIAGNOSIS — S19.83XD: Primary | ICD-10-CM

## 2023-04-17 PROCEDURE — 3074F SYST BP LT 130 MM HG: CPT | Performed by: OTOLARYNGOLOGY

## 2023-04-17 PROCEDURE — G8427 DOCREV CUR MEDS BY ELIG CLIN: HCPCS | Performed by: OTOLARYNGOLOGY

## 2023-04-17 PROCEDURE — 1036F TOBACCO NON-USER: CPT | Performed by: OTOLARYNGOLOGY

## 2023-04-17 PROCEDURE — G8417 CALC BMI ABV UP PARAM F/U: HCPCS | Performed by: OTOLARYNGOLOGY

## 2023-04-17 PROCEDURE — 3078F DIAST BP <80 MM HG: CPT | Performed by: OTOLARYNGOLOGY

## 2023-04-17 PROCEDURE — 3017F COLORECTAL CA SCREEN DOC REV: CPT | Performed by: OTOLARYNGOLOGY

## 2023-04-17 PROCEDURE — 99214 OFFICE O/P EST MOD 30 MIN: CPT | Performed by: OTOLARYNGOLOGY

## 2023-04-17 RX ORDER — MAGNESIUM OXIDE 400 MG/1
1 TABLET ORAL DAILY
COMMUNITY
Start: 2023-03-22

## 2023-04-17 ASSESSMENT — ENCOUNTER SYMPTOMS
GASTROINTESTINAL NEGATIVE: 1
VOICE CHANGE: 1
SINUS PRESSURE: 0
WHEEZING: 0
RHINORRHEA: 0
SORE THROAT: 0
COLOR CHANGE: 0
COUGH: 0
STRIDOR: 0
CHOKING: 0
TROUBLE SWALLOWING: 0
SINUS PAIN: 0

## 2023-04-17 ASSESSMENT — VISUAL ACUITY: OU: 1

## 2023-04-20 ENCOUNTER — TELEPHONE (OUTPATIENT)
Dept: ENDOCRINOLOGY | Age: 63
End: 2023-04-20

## 2023-04-20 NOTE — TELEPHONE ENCOUNTER
Patient called for refill for metformin and  Amaryl 2 mg daily    Left message for a return call, because it stated she is off the metformin due to kidney function issue and it also stated she is taking Amaryl 4 mg daily    Called patient to clarification

## 2023-04-21 DIAGNOSIS — N18.2 TYPE 2 DIABETES MELLITUS WITH STAGE 2 CHRONIC KIDNEY DISEASE, WITHOUT LONG-TERM CURRENT USE OF INSULIN (HCC): ICD-10-CM

## 2023-04-21 DIAGNOSIS — E11.65 UNCONTROLLED TYPE 2 DIABETES MELLITUS WITH HYPERGLYCEMIA (HCC): ICD-10-CM

## 2023-04-21 DIAGNOSIS — E11.22 TYPE 2 DIABETES MELLITUS WITH STAGE 2 CHRONIC KIDNEY DISEASE, WITHOUT LONG-TERM CURRENT USE OF INSULIN (HCC): ICD-10-CM

## 2023-04-21 NOTE — TELEPHONE ENCOUNTER
Pt states her nephrologist said back in September that she is ok to resume metformin and that 4mg of glimepiride is too much and makes her sugar drop

## 2023-04-23 RX ORDER — METFORMIN HYDROCHLORIDE 750 MG/1
TABLET, EXTENDED RELEASE ORAL
Qty: 60 TABLET | Refills: 0 | Status: SHIPPED | OUTPATIENT
Start: 2023-04-23

## 2023-04-23 RX ORDER — GLIMEPIRIDE 2 MG/1
TABLET ORAL
Qty: 30 TABLET | Refills: 0 | Status: SHIPPED | OUTPATIENT
Start: 2023-04-23

## 2023-07-17 ENCOUNTER — HOSPITAL ENCOUNTER (OUTPATIENT)
Dept: MRI IMAGING | Age: 63
Discharge: HOME | End: 2023-07-19
Attending: FAMILY MEDICINE
Payer: MEDICAID

## 2023-07-17 DIAGNOSIS — M21.372 LEFT FOOT DROP: ICD-10-CM

## 2023-07-17 PROCEDURE — 72148 MRI LUMBAR SPINE W/O DYE: CPT

## 2023-08-02 RX ORDER — BLOOD SUGAR DIAGNOSTIC
STRIP MISCELLANEOUS
Refills: 0 | OUTPATIENT
Start: 2023-08-02

## 2024-05-01 ENCOUNTER — HOSPITAL ENCOUNTER (OUTPATIENT)
Age: 64
Discharge: HOME | End: 2024-05-01

## 2024-05-01 DIAGNOSIS — E11.22 TYPE 2 DIABETES MELLITUS WITH STAGE 2 CHRONIC KIDNEY DISEASE, WITHOUT LONG-TERM CURRENT USE OF INSULIN (HCC): ICD-10-CM

## 2024-05-01 DIAGNOSIS — N18.2 TYPE 2 DIABETES MELLITUS WITH STAGE 2 CHRONIC KIDNEY DISEASE, WITHOUT LONG-TERM CURRENT USE OF INSULIN (HCC): ICD-10-CM

## 2024-05-01 LAB
CHOLEST SERPL-MCNC: 181 MG/DL
HBA1C MFR BLD: 6.5 % (ref 4–5.6)
HDLC SERPL-MCNC: 46 MG/DL
LDLC SERPL CALC-MCNC: 117 MG/DL
TRIGL SERPL-MCNC: 92 MG/DL
VLDLC SERPL CALC-MCNC: 18 MG/DL

## 2024-05-01 PROCEDURE — 36415 COLL VENOUS BLD VENIPUNCTURE: CPT

## 2024-05-01 PROCEDURE — 83036 HEMOGLOBIN GLYCOSYLATED A1C: CPT

## 2024-05-01 PROCEDURE — 80061 LIPID PANEL: CPT

## 2024-09-19 ENCOUNTER — HOSPITAL ENCOUNTER (INPATIENT)
Age: 64
LOS: 4 days | Discharge: HOME | DRG: 853 | End: 2024-09-23
Attending: EMERGENCY MEDICINE | Admitting: INTERNAL MEDICINE

## 2024-09-19 ENCOUNTER — APPOINTMENT (OUTPATIENT)
Dept: CT IMAGING | Age: 64
DRG: 853 | End: 2024-09-19

## 2024-09-19 DIAGNOSIS — E87.5 HYPERKALEMIA: ICD-10-CM

## 2024-09-19 DIAGNOSIS — N17.9 AKI (ACUTE KIDNEY INJURY) (HCC): ICD-10-CM

## 2024-09-19 DIAGNOSIS — N39.0 URINARY TRACT INFECTION IN FEMALE: ICD-10-CM

## 2024-09-19 DIAGNOSIS — N20.0 NEPHROLITHIASIS: Primary | ICD-10-CM

## 2024-09-19 PROBLEM — N18.9 ACUTE KIDNEY INJURY SUPERIMPOSED ON CKD (HCC): Status: ACTIVE | Noted: 2024-09-19

## 2024-09-19 PROBLEM — R52 INTRACTABLE PAIN: Status: ACTIVE | Noted: 2024-09-19

## 2024-09-19 PROBLEM — N20.1 CALCULUS OF LEFT URETER: Status: ACTIVE | Noted: 2024-09-19

## 2024-09-19 LAB
ALBUMIN SERPL-MCNC: 4.6 G/DL (ref 3.5–5.2)
ALP SERPL-CCNC: 64 U/L (ref 35–104)
ALT SERPL-CCNC: 19 U/L (ref 0–32)
ANION GAP SERPL CALCULATED.3IONS-SCNC: 14 MMOL/L (ref 7–16)
ANION GAP SERPL CALCULATED.3IONS-SCNC: 14 MMOL/L (ref 7–16)
AST SERPL-CCNC: 16 U/L (ref 0–31)
BACTERIA URNS QL MICRO: ABNORMAL
BASOPHILS # BLD: 0 K/UL (ref 0–0.2)
BASOPHILS NFR BLD: 0 % (ref 0–2)
BILIRUB SERPL-MCNC: 0.7 MG/DL (ref 0–1.2)
BILIRUB UR QL STRIP: NEGATIVE
BUN SERPL-MCNC: 25 MG/DL (ref 6–23)
BUN SERPL-MCNC: 26 MG/DL (ref 6–23)
CALCIUM SERPL-MCNC: 8.6 MG/DL (ref 8.6–10.2)
CALCIUM SERPL-MCNC: 9.2 MG/DL (ref 8.6–10.2)
CHLORIDE SERPL-SCNC: 101 MMOL/L (ref 98–107)
CHLORIDE SERPL-SCNC: 104 MMOL/L (ref 98–107)
CLARITY UR: CLEAR
CO2 SERPL-SCNC: 20 MMOL/L (ref 22–29)
CO2 SERPL-SCNC: 20 MMOL/L (ref 22–29)
COLOR UR: YELLOW
CREAT SERPL-MCNC: 1.7 MG/DL (ref 0.5–1)
CREAT SERPL-MCNC: 2.1 MG/DL (ref 0.5–1)
CRYSTALS URNS MICRO: ABNORMAL /HPF
EOSINOPHIL # BLD: 0 K/UL (ref 0.05–0.5)
EOSINOPHILS RELATIVE PERCENT: 0 % (ref 0–6)
EPI CELLS #/AREA URNS HPF: ABNORMAL /HPF
ERYTHROCYTE [DISTWIDTH] IN BLOOD BY AUTOMATED COUNT: 12.9 % (ref 11.5–15)
GFR, ESTIMATED: 26 ML/MIN/1.73M2
GFR, ESTIMATED: 33 ML/MIN/1.73M2
GLUCOSE SERPL-MCNC: 184 MG/DL (ref 74–99)
GLUCOSE SERPL-MCNC: 195 MG/DL (ref 74–99)
GLUCOSE UR STRIP-MCNC: NEGATIVE MG/DL
HCT VFR BLD AUTO: 38 % (ref 34–48)
HGB BLD-MCNC: 12.8 G/DL (ref 11.5–15.5)
HGB UR QL STRIP.AUTO: ABNORMAL
KETONES UR STRIP-MCNC: ABNORMAL MG/DL
LEUKOCYTE ESTERASE UR QL STRIP: ABNORMAL
LYMPHOCYTES NFR BLD: 0.4 K/UL (ref 1.5–4)
LYMPHOCYTES RELATIVE PERCENT: 4 % (ref 20–42)
MCH RBC QN AUTO: 30 PG (ref 26–35)
MCHC RBC AUTO-ENTMCNC: 33.7 G/DL (ref 32–34.5)
MCV RBC AUTO: 89.2 FL (ref 80–99.9)
METAMYELOCYTES ABSOLUTE COUNT: 0.3 K/UL (ref 0–0.12)
METAMYELOCYTES: 3 % (ref 0–1)
MONOCYTES NFR BLD: 0 % (ref 2–12)
MONOCYTES NFR BLD: 0 K/UL (ref 0.1–0.95)
NEUTROPHILS NFR BLD: 94 % (ref 43–80)
NEUTS SEG NFR BLD: 10.8 K/UL (ref 1.8–7.3)
NITRITE UR QL STRIP: POSITIVE
PH UR STRIP: 6 [PH] (ref 5–9)
PLATELET # BLD AUTO: 391 K/UL (ref 130–450)
PMV BLD AUTO: 8.8 FL (ref 7–12)
POTASSIUM SERPL-SCNC: 5.5 MMOL/L (ref 3.5–5)
POTASSIUM SERPL-SCNC: 5.9 MMOL/L (ref 3.5–5)
PROT SERPL-MCNC: 7.9 G/DL (ref 6.4–8.3)
PROT UR STRIP-MCNC: 30 MG/DL
RBC # BLD AUTO: 4.26 M/UL (ref 3.5–5.5)
RBC # BLD: ABNORMAL 10*6/UL
RBC # BLD: ABNORMAL 10*6/UL
RBC #/AREA URNS HPF: ABNORMAL /HPF
SODIUM SERPL-SCNC: 135 MMOL/L (ref 132–146)
SODIUM SERPL-SCNC: 138 MMOL/L (ref 132–146)
SP GR UR STRIP: >1.03 (ref 1–1.03)
UROBILINOGEN UR STRIP-ACNC: 0.2 EU/DL (ref 0–1)
WBC #/AREA URNS HPF: ABNORMAL /HPF
WBC OTHER # BLD: 11.5 K/UL (ref 4.5–11.5)

## 2024-09-19 PROCEDURE — 96374 THER/PROPH/DIAG INJ IV PUSH: CPT

## 2024-09-19 PROCEDURE — 87086 URINE CULTURE/COLONY COUNT: CPT

## 2024-09-19 PROCEDURE — 6360000002 HC RX W HCPCS

## 2024-09-19 PROCEDURE — 80053 COMPREHEN METABOLIC PANEL: CPT

## 2024-09-19 PROCEDURE — 96375 TX/PRO/DX INJ NEW DRUG ADDON: CPT

## 2024-09-19 PROCEDURE — 99285 EMERGENCY DEPT VISIT HI MDM: CPT

## 2024-09-19 PROCEDURE — 80048 BASIC METABOLIC PNL TOTAL CA: CPT

## 2024-09-19 PROCEDURE — 93005 ELECTROCARDIOGRAM TRACING: CPT

## 2024-09-19 PROCEDURE — 85025 COMPLETE CBC W/AUTO DIFF WBC: CPT

## 2024-09-19 PROCEDURE — 83735 ASSAY OF MAGNESIUM: CPT

## 2024-09-19 PROCEDURE — 1200000000 HC SEMI PRIVATE

## 2024-09-19 PROCEDURE — 2580000003 HC RX 258

## 2024-09-19 PROCEDURE — 74176 CT ABD & PELVIS W/O CONTRAST: CPT

## 2024-09-19 PROCEDURE — 81001 URINALYSIS AUTO W/SCOPE: CPT

## 2024-09-19 RX ORDER — 0.9 % SODIUM CHLORIDE 0.9 %
1000 INTRAVENOUS SOLUTION INTRAVENOUS ONCE
Status: COMPLETED | OUTPATIENT
Start: 2024-09-19 | End: 2024-09-19

## 2024-09-19 RX ORDER — ONDANSETRON 2 MG/ML
4 INJECTION INTRAMUSCULAR; INTRAVENOUS ONCE
Status: COMPLETED | OUTPATIENT
Start: 2024-09-19 | End: 2024-09-19

## 2024-09-19 RX ORDER — CALCIUM GLUCONATE 20 MG/ML
1000 INJECTION, SOLUTION INTRAVENOUS ONCE
Status: COMPLETED | OUTPATIENT
Start: 2024-09-20 | End: 2024-09-20

## 2024-09-19 RX ORDER — FENTANYL CITRATE 50 UG/ML
25 INJECTION, SOLUTION INTRAMUSCULAR; INTRAVENOUS ONCE
Status: COMPLETED | OUTPATIENT
Start: 2024-09-19 | End: 2024-09-19

## 2024-09-19 RX ADMIN — HYDROMORPHONE HYDROCHLORIDE 0.5 MG: 1 INJECTION, SOLUTION INTRAMUSCULAR; INTRAVENOUS; SUBCUTANEOUS at 23:18

## 2024-09-19 RX ADMIN — FENTANYL CITRATE 25 MCG: 50 INJECTION INTRAMUSCULAR; INTRAVENOUS at 19:24

## 2024-09-19 RX ADMIN — SODIUM CHLORIDE 1000 ML: 9 INJECTION, SOLUTION INTRAVENOUS at 20:28

## 2024-09-19 RX ADMIN — ONDANSETRON 4 MG: 2 INJECTION INTRAMUSCULAR; INTRAVENOUS at 19:22

## 2024-09-19 RX ADMIN — WATER 1000 MG: 1 INJECTION INTRAMUSCULAR; INTRAVENOUS; SUBCUTANEOUS at 19:26

## 2024-09-19 ASSESSMENT — PAIN DESCRIPTION - ORIENTATION: ORIENTATION: LEFT

## 2024-09-19 ASSESSMENT — PAIN DESCRIPTION - LOCATION: LOCATION: FLANK

## 2024-09-19 ASSESSMENT — PAIN DESCRIPTION - DESCRIPTORS: DESCRIPTORS: ACHING

## 2024-09-19 ASSESSMENT — PAIN DESCRIPTION - FREQUENCY: FREQUENCY: CONTINUOUS

## 2024-09-19 ASSESSMENT — PAIN SCALES - GENERAL
PAINLEVEL_OUTOF10: 10
PAINLEVEL_OUTOF10: 9

## 2024-09-19 ASSESSMENT — PAIN - FUNCTIONAL ASSESSMENT
PAIN_FUNCTIONAL_ASSESSMENT: 0-10
PAIN_FUNCTIONAL_ASSESSMENT: PREVENTS OR INTERFERES SOME ACTIVE ACTIVITIES AND ADLS

## 2024-09-19 ASSESSMENT — PAIN DESCRIPTION - PAIN TYPE: TYPE: ACUTE PAIN

## 2024-09-20 ENCOUNTER — APPOINTMENT (OUTPATIENT)
Dept: GENERAL RADIOLOGY | Age: 64
DRG: 853 | End: 2024-09-20

## 2024-09-20 ENCOUNTER — ANESTHESIA EVENT (OUTPATIENT)
Dept: OPERATING ROOM | Age: 64
End: 2024-09-20

## 2024-09-20 ENCOUNTER — ANESTHESIA (OUTPATIENT)
Dept: OPERATING ROOM | Age: 64
End: 2024-09-20

## 2024-09-20 PROBLEM — E87.20 ACIDOSIS: Status: ACTIVE | Noted: 2024-09-20

## 2024-09-20 PROBLEM — E83.42 HYPOMAGNESEMIA: Status: ACTIVE | Noted: 2024-09-20

## 2024-09-20 PROBLEM — N17.9 AKI (ACUTE KIDNEY INJURY) (HCC): Status: ACTIVE | Noted: 2024-09-20

## 2024-09-20 LAB
ANION GAP SERPL CALCULATED.3IONS-SCNC: 11 MMOL/L (ref 7–16)
ANION GAP SERPL CALCULATED.3IONS-SCNC: 13 MMOL/L (ref 7–16)
ANION GAP SERPL CALCULATED.3IONS-SCNC: 14 MMOL/L (ref 7–16)
ANION GAP SERPL CALCULATED.3IONS-SCNC: 14 MMOL/L (ref 7–16)
BASOPHILS # BLD: 0 K/UL (ref 0–0.2)
BASOPHILS # BLD: 0.11 K/UL (ref 0–0.2)
BASOPHILS NFR BLD: 0 % (ref 0–2)
BASOPHILS NFR BLD: 1 % (ref 0–2)
BNP SERPL-MCNC: 2098 PG/ML (ref 0–125)
BUN SERPL-MCNC: 29 MG/DL (ref 6–23)
CALCIUM SERPL-MCNC: 8.6 MG/DL (ref 8.6–10.2)
CALCIUM SERPL-MCNC: 8.8 MG/DL (ref 8.6–10.2)
CALCIUM SERPL-MCNC: 9.4 MG/DL (ref 8.6–10.2)
CALCIUM SERPL-MCNC: 9.5 MG/DL (ref 8.6–10.2)
CHLORIDE SERPL-SCNC: 101 MMOL/L (ref 98–107)
CHLORIDE SERPL-SCNC: 103 MMOL/L (ref 98–107)
CHLORIDE SERPL-SCNC: 103 MMOL/L (ref 98–107)
CHLORIDE SERPL-SCNC: 97 MMOL/L (ref 98–107)
CO2 SERPL-SCNC: 18 MMOL/L (ref 22–29)
CO2 SERPL-SCNC: 20 MMOL/L (ref 22–29)
CO2 SERPL-SCNC: 24 MMOL/L (ref 22–29)
CO2 SERPL-SCNC: 25 MMOL/L (ref 22–29)
CREAT SERPL-MCNC: 2.4 MG/DL (ref 0.5–1)
CREAT SERPL-MCNC: 2.5 MG/DL (ref 0.5–1)
CREAT SERPL-MCNC: 2.5 MG/DL (ref 0.5–1)
CREAT SERPL-MCNC: 2.6 MG/DL (ref 0.5–1)
EKG ATRIAL RATE: 117 BPM
EKG ATRIAL RATE: 22 BPM
EKG P AXIS: -104 DEGREES
EKG P AXIS: 14 DEGREES
EKG P-R INTERVAL: 154 MS
EKG Q-T INTERVAL: 322 MS
EKG Q-T INTERVAL: 372 MS
EKG QRS DURATION: 130 MS
EKG QRS DURATION: 140 MS
EKG QTC CALCULATION (BAZETT): 449 MS
EKG QTC CALCULATION (BAZETT): 558 MS
EKG R AXIS: -33 DEGREES
EKG R AXIS: -33 DEGREES
EKG T AXIS: 151 DEGREES
EKG T AXIS: 64 DEGREES
EKG VENTRICULAR RATE: 117 BPM
EKG VENTRICULAR RATE: 135 BPM
EOSINOPHIL # BLD: 0 K/UL (ref 0.05–0.5)
EOSINOPHIL # BLD: 0.16 K/UL (ref 0.05–0.5)
EOSINOPHILS RELATIVE PERCENT: 0 % (ref 0–6)
EOSINOPHILS RELATIVE PERCENT: 1 % (ref 0–6)
ERYTHROCYTE [DISTWIDTH] IN BLOOD BY AUTOMATED COUNT: 13.2 % (ref 11.5–15)
ERYTHROCYTE [DISTWIDTH] IN BLOOD BY AUTOMATED COUNT: 13.3 % (ref 11.5–15)
GFR, ESTIMATED: 20 ML/MIN/1.73M2
GFR, ESTIMATED: 21 ML/MIN/1.73M2
GFR, ESTIMATED: 21 ML/MIN/1.73M2
GFR, ESTIMATED: 22 ML/MIN/1.73M2
GLUCOSE BLD-MCNC: 229 MG/DL (ref 74–99)
GLUCOSE BLD-MCNC: 232 MG/DL (ref 74–99)
GLUCOSE BLD-MCNC: 237 MG/DL (ref 74–99)
GLUCOSE BLD-MCNC: 240 MG/DL (ref 74–99)
GLUCOSE BLD-MCNC: 269 MG/DL (ref 74–99)
GLUCOSE BLD-MCNC: 377 MG/DL (ref 74–99)
GLUCOSE SERPL-MCNC: 190 MG/DL (ref 74–99)
GLUCOSE SERPL-MCNC: 234 MG/DL (ref 74–99)
GLUCOSE SERPL-MCNC: 256 MG/DL (ref 74–99)
GLUCOSE SERPL-MCNC: 263 MG/DL (ref 74–99)
HCT VFR BLD AUTO: 31.6 % (ref 34–48)
HCT VFR BLD AUTO: 34.6 % (ref 34–48)
HGB BLD-MCNC: 10.6 G/DL (ref 11.5–15.5)
HGB BLD-MCNC: 11.3 G/DL (ref 11.5–15.5)
LACTATE BLDV-SCNC: 3.4 MMOL/L (ref 0.5–2.2)
LYMPHOCYTES NFR BLD: 0.34 K/UL (ref 1.5–4)
LYMPHOCYTES NFR BLD: 0.98 K/UL (ref 1.5–4)
LYMPHOCYTES RELATIVE PERCENT: 3 % (ref 20–42)
LYMPHOCYTES RELATIVE PERCENT: 5 % (ref 20–42)
MAGNESIUM SERPL-MCNC: 1.4 MG/DL (ref 1.6–2.6)
MAGNESIUM SERPL-MCNC: 1.4 MG/DL (ref 1.6–2.6)
MAGNESIUM SERPL-MCNC: 1.5 MG/DL (ref 1.6–2.6)
MAGNESIUM SERPL-MCNC: 2.4 MG/DL (ref 1.6–2.6)
MCH RBC QN AUTO: 29.9 PG (ref 26–35)
MCH RBC QN AUTO: 30.1 PG (ref 26–35)
MCHC RBC AUTO-ENTMCNC: 32.7 G/DL (ref 32–34.5)
MCHC RBC AUTO-ENTMCNC: 33.5 G/DL (ref 32–34.5)
MCV RBC AUTO: 89.8 FL (ref 80–99.9)
MCV RBC AUTO: 91.5 FL (ref 80–99.9)
METAMYELOCYTES ABSOLUTE COUNT: 0.16 K/UL (ref 0–0.12)
METAMYELOCYTES ABSOLUTE COUNT: 0.78 K/UL (ref 0–0.12)
METAMYELOCYTES: 1 % (ref 0–1)
METAMYELOCYTES: 7 % (ref 0–1)
MONOCYTES NFR BLD: 0.22 K/UL (ref 0.1–0.95)
MONOCYTES NFR BLD: 0.33 K/UL (ref 0.1–0.95)
MONOCYTES NFR BLD: 2 % (ref 2–12)
MONOCYTES NFR BLD: 2 % (ref 2–12)
MYELOCYTES ABSOLUTE COUNT: 0.16 K/UL
MYELOCYTES: 1 %
NEUTROPHILS NFR BLD: 87 % (ref 43–80)
NEUTROPHILS NFR BLD: 90 % (ref 43–80)
NEUTS SEG NFR BLD: 16.9 K/UL (ref 1.8–7.3)
NEUTS SEG NFR BLD: 9.74 K/UL (ref 1.8–7.3)
PHOSPHATE SERPL-MCNC: 3.6 MG/DL (ref 2.5–4.5)
PHOSPHATE SERPL-MCNC: 3.8 MG/DL (ref 2.5–4.5)
PLATELET # BLD AUTO: 297 K/UL (ref 130–450)
PLATELET # BLD AUTO: 301 K/UL (ref 130–450)
PMV BLD AUTO: 8.9 FL (ref 7–12)
PMV BLD AUTO: 9 FL (ref 7–12)
POTASSIUM SERPL-SCNC: 4.5 MMOL/L (ref 3.5–5)
POTASSIUM SERPL-SCNC: 4.7 MMOL/L (ref 3.5–5)
POTASSIUM SERPL-SCNC: 4.7 MMOL/L (ref 3.5–5)
POTASSIUM SERPL-SCNC: 7 MMOL/L (ref 3.5–5)
POTASSIUM SERPL-SCNC: 7 MMOL/L (ref 3.5–5)
POTASSIUM SERPL-SCNC: 7.2 MMOL/L (ref 3.5–5)
RBC # BLD AUTO: 3.52 M/UL (ref 3.5–5.5)
RBC # BLD AUTO: 3.78 M/UL (ref 3.5–5.5)
RBC # BLD: ABNORMAL 10*6/UL
SODIUM SERPL-SCNC: 133 MMOL/L (ref 132–146)
SODIUM SERPL-SCNC: 134 MMOL/L (ref 132–146)
SODIUM SERPL-SCNC: 136 MMOL/L (ref 132–146)
SODIUM SERPL-SCNC: 140 MMOL/L (ref 132–146)
TROPONIN I SERPL HS-MCNC: 49 NG/L (ref 0–9)
WBC OTHER # BLD: 11.2 K/UL (ref 4.5–11.5)
WBC OTHER # BLD: 18.7 K/UL (ref 4.5–11.5)

## 2024-09-20 PROCEDURE — 93005 ELECTROCARDIOGRAM TRACING: CPT | Performed by: INTERNAL MEDICINE

## 2024-09-20 PROCEDURE — 6370000000 HC RX 637 (ALT 250 FOR IP): Performed by: NURSE PRACTITIONER

## 2024-09-20 PROCEDURE — 85025 COMPLETE CBC W/AUTO DIFF WBC: CPT

## 2024-09-20 PROCEDURE — 74420 UROGRAPHY RTRGR +-KUB: CPT

## 2024-09-20 PROCEDURE — 6370000000 HC RX 637 (ALT 250 FOR IP): Performed by: INTERNAL MEDICINE

## 2024-09-20 PROCEDURE — 84132 ASSAY OF SERUM POTASSIUM: CPT

## 2024-09-20 PROCEDURE — 3600000003 HC SURGERY LEVEL 3 BASE: Performed by: UROLOGY

## 2024-09-20 PROCEDURE — 6360000002 HC RX W HCPCS: Performed by: NURSE ANESTHETIST, CERTIFIED REGISTERED

## 2024-09-20 PROCEDURE — 6360000002 HC RX W HCPCS

## 2024-09-20 PROCEDURE — 6360000002 HC RX W HCPCS: Performed by: NURSE PRACTITIONER

## 2024-09-20 PROCEDURE — 83605 ASSAY OF LACTIC ACID: CPT

## 2024-09-20 PROCEDURE — C2617 STENT, NON-COR, TEM W/O DEL: HCPCS | Performed by: UROLOGY

## 2024-09-20 PROCEDURE — 36415 COLL VENOUS BLD VENIPUNCTURE: CPT

## 2024-09-20 PROCEDURE — 80048 BASIC METABOLIC PNL TOTAL CA: CPT

## 2024-09-20 PROCEDURE — 2580000003 HC RX 258: Performed by: NURSE PRACTITIONER

## 2024-09-20 PROCEDURE — 83735 ASSAY OF MAGNESIUM: CPT

## 2024-09-20 PROCEDURE — 2500000003 HC RX 250 WO HCPCS: Performed by: INTERNAL MEDICINE

## 2024-09-20 PROCEDURE — 6360000002 HC RX W HCPCS: Performed by: INTERNAL MEDICINE

## 2024-09-20 PROCEDURE — 3700000000 HC ANESTHESIA ATTENDED CARE: Performed by: UROLOGY

## 2024-09-20 PROCEDURE — 2060000000 HC ICU INTERMEDIATE R&B

## 2024-09-20 PROCEDURE — 3700000001 HC ADD 15 MINUTES (ANESTHESIA): Performed by: UROLOGY

## 2024-09-20 PROCEDURE — 3600000013 HC SURGERY LEVEL 3 ADDTL 15MIN: Performed by: UROLOGY

## 2024-09-20 PROCEDURE — 2580000003 HC RX 258: Performed by: INTERNAL MEDICINE

## 2024-09-20 PROCEDURE — C1758 CATHETER, URETERAL: HCPCS | Performed by: UROLOGY

## 2024-09-20 PROCEDURE — 87081 CULTURE SCREEN ONLY: CPT

## 2024-09-20 PROCEDURE — 83880 ASSAY OF NATRIURETIC PEPTIDE: CPT

## 2024-09-20 PROCEDURE — 93010 ELECTROCARDIOGRAM REPORT: CPT | Performed by: INTERNAL MEDICINE

## 2024-09-20 PROCEDURE — 82962 GLUCOSE BLOOD TEST: CPT

## 2024-09-20 PROCEDURE — 99291 CRITICAL CARE FIRST HOUR: CPT | Performed by: INTERNAL MEDICINE

## 2024-09-20 PROCEDURE — 2580000003 HC RX 258: Performed by: NURSE ANESTHETIST, CERTIFIED REGISTERED

## 2024-09-20 PROCEDURE — 2500000003 HC RX 250 WO HCPCS: Performed by: NURSE ANESTHETIST, CERTIFIED REGISTERED

## 2024-09-20 PROCEDURE — 71045 X-RAY EXAM CHEST 1 VIEW: CPT

## 2024-09-20 PROCEDURE — 2500000003 HC RX 250 WO HCPCS

## 2024-09-20 PROCEDURE — 87086 URINE CULTURE/COLONY COUNT: CPT

## 2024-09-20 PROCEDURE — BT1F1ZZ FLUOROSCOPY OF LEFT KIDNEY, URETER AND BLADDER USING LOW OSMOLAR CONTRAST: ICD-10-PCS | Performed by: UROLOGY

## 2024-09-20 PROCEDURE — 0T778DZ DILATION OF LEFT URETER WITH INTRALUMINAL DEVICE, VIA NATURAL OR ARTIFICIAL OPENING ENDOSCOPIC: ICD-10-PCS | Performed by: UROLOGY

## 2024-09-20 PROCEDURE — 84484 ASSAY OF TROPONIN QUANT: CPT

## 2024-09-20 PROCEDURE — 84100 ASSAY OF PHOSPHORUS: CPT

## 2024-09-20 PROCEDURE — 2709999900 HC NON-CHARGEABLE SUPPLY: Performed by: UROLOGY

## 2024-09-20 PROCEDURE — 87040 BLOOD CULTURE FOR BACTERIA: CPT

## 2024-09-20 DEVICE — URETERAL STENT
Type: IMPLANTABLE DEVICE | Site: URETER | Status: FUNCTIONAL
Brand: PERCUFLEX™

## 2024-09-20 RX ORDER — CALCIUM GLUCONATE 20 MG/ML
1000 INJECTION, SOLUTION INTRAVENOUS ONCE
Status: COMPLETED | OUTPATIENT
Start: 2024-09-20 | End: 2024-09-20

## 2024-09-20 RX ORDER — CINACALCET 30 MG/1
60 TABLET, FILM COATED ORAL DAILY
Status: DISCONTINUED | OUTPATIENT
Start: 2024-09-20 | End: 2024-09-23 | Stop reason: HOSPADM

## 2024-09-20 RX ORDER — DEXTROSE MONOHYDRATE 100 MG/ML
INJECTION, SOLUTION INTRAVENOUS CONTINUOUS PRN
Status: DISCONTINUED | OUTPATIENT
Start: 2024-09-20 | End: 2024-09-23 | Stop reason: HOSPADM

## 2024-09-20 RX ORDER — INSULIN LISPRO 100 [IU]/ML
10 INJECTION, SOLUTION INTRAVENOUS; SUBCUTANEOUS ONCE
Status: COMPLETED | OUTPATIENT
Start: 2024-09-20 | End: 2024-09-20

## 2024-09-20 RX ORDER — HYDRALAZINE HYDROCHLORIDE 20 MG/ML
10 INJECTION INTRAMUSCULAR; INTRAVENOUS EVERY 4 HOURS PRN
Status: DISCONTINUED | OUTPATIENT
Start: 2024-09-20 | End: 2024-09-23 | Stop reason: HOSPADM

## 2024-09-20 RX ORDER — SODIUM CHLORIDE 9 MG/ML
INJECTION, SOLUTION INTRAVENOUS PRN
Status: DISCONTINUED | OUTPATIENT
Start: 2024-09-20 | End: 2024-09-23 | Stop reason: HOSPADM

## 2024-09-20 RX ORDER — SODIUM CHLORIDE 0.9 % (FLUSH) 0.9 %
5-40 SYRINGE (ML) INJECTION PRN
Status: DISCONTINUED | OUTPATIENT
Start: 2024-09-20 | End: 2024-09-23 | Stop reason: HOSPADM

## 2024-09-20 RX ORDER — HYDROCODONE BITARTRATE AND ACETAMINOPHEN 5; 325 MG/1; MG/1
1 TABLET ORAL EVERY 6 HOURS PRN
Status: DISCONTINUED | OUTPATIENT
Start: 2024-09-20 | End: 2024-09-23 | Stop reason: HOSPADM

## 2024-09-20 RX ORDER — DIPHENHYDRAMINE HYDROCHLORIDE 50 MG/ML
INJECTION INTRAMUSCULAR; INTRAVENOUS
Status: DISCONTINUED | OUTPATIENT
Start: 2024-09-20 | End: 2024-09-20 | Stop reason: SDUPTHER

## 2024-09-20 RX ORDER — GLUCAGON 1 MG/ML
1 KIT INJECTION PRN
Status: DISCONTINUED | OUTPATIENT
Start: 2024-09-20 | End: 2024-09-23 | Stop reason: HOSPADM

## 2024-09-20 RX ORDER — ONDANSETRON 2 MG/ML
INJECTION INTRAMUSCULAR; INTRAVENOUS
Status: DISCONTINUED | OUTPATIENT
Start: 2024-09-20 | End: 2024-09-20 | Stop reason: SDUPTHER

## 2024-09-20 RX ORDER — INSULIN GLARGINE 100 [IU]/ML
14 INJECTION, SOLUTION SUBCUTANEOUS NIGHTLY
Status: DISCONTINUED | OUTPATIENT
Start: 2024-09-20 | End: 2024-09-22

## 2024-09-20 RX ORDER — HEPARIN SODIUM 5000 [USP'U]/ML
5000 INJECTION, SOLUTION INTRAVENOUS; SUBCUTANEOUS EVERY 8 HOURS SCHEDULED
Status: DISCONTINUED | OUTPATIENT
Start: 2024-09-21 | End: 2024-09-23 | Stop reason: HOSPADM

## 2024-09-20 RX ORDER — PROPOFOL 10 MG/ML
INJECTION, EMULSION INTRAVENOUS
Status: DISCONTINUED | OUTPATIENT
Start: 2024-09-20 | End: 2024-09-20 | Stop reason: SDUPTHER

## 2024-09-20 RX ORDER — METOPROLOL TARTRATE 1 MG/ML
INJECTION, SOLUTION INTRAVENOUS
Status: COMPLETED
Start: 2024-09-20 | End: 2024-09-20

## 2024-09-20 RX ORDER — INSULIN LISPRO 100 [IU]/ML
0-4 INJECTION, SOLUTION INTRAVENOUS; SUBCUTANEOUS
Status: DISCONTINUED | OUTPATIENT
Start: 2024-09-20 | End: 2024-09-22

## 2024-09-20 RX ORDER — BISACODYL 5 MG/1
5 TABLET, DELAYED RELEASE ORAL DAILY PRN
Status: DISCONTINUED | OUTPATIENT
Start: 2024-09-20 | End: 2024-09-23 | Stop reason: HOSPADM

## 2024-09-20 RX ORDER — SODIUM CHLORIDE 9 MG/ML
INJECTION, SOLUTION INTRAVENOUS CONTINUOUS
Status: DISCONTINUED | OUTPATIENT
Start: 2024-09-20 | End: 2024-09-20

## 2024-09-20 RX ORDER — SPIRONOLACTONE 25 MG/1
25 TABLET ORAL DAILY
Status: DISCONTINUED | OUTPATIENT
Start: 2024-09-20 | End: 2024-09-23

## 2024-09-20 RX ORDER — CARVEDILOL 25 MG/1
25 TABLET ORAL 2 TIMES DAILY WITH MEALS
Status: DISCONTINUED | OUTPATIENT
Start: 2024-09-20 | End: 2024-09-23 | Stop reason: HOSPADM

## 2024-09-20 RX ORDER — ACETAMINOPHEN 325 MG/1
650 TABLET ORAL EVERY 6 HOURS PRN
Status: DISCONTINUED | OUTPATIENT
Start: 2024-09-20 | End: 2024-09-23 | Stop reason: HOSPADM

## 2024-09-20 RX ORDER — CALCIUM GLUCONATE 94 MG/ML
1000 INJECTION, SOLUTION INTRAVENOUS ONCE
Status: DISCONTINUED | OUTPATIENT
Start: 2024-09-20 | End: 2024-09-20 | Stop reason: SDUPTHER

## 2024-09-20 RX ORDER — VITAMIN B COMPLEX
5000 TABLET ORAL
Status: DISCONTINUED | OUTPATIENT
Start: 2024-09-20 | End: 2024-09-23 | Stop reason: HOSPADM

## 2024-09-20 RX ORDER — FLUDROCORTISONE ACETATE 0.1 MG/1
0.1 TABLET ORAL ONCE
Status: COMPLETED | OUTPATIENT
Start: 2024-09-20 | End: 2024-09-20

## 2024-09-20 RX ORDER — MAGNESIUM SULFATE IN WATER 40 MG/ML
2000 INJECTION, SOLUTION INTRAVENOUS ONCE
Status: COMPLETED | OUTPATIENT
Start: 2024-09-20 | End: 2024-09-20

## 2024-09-20 RX ORDER — METOPROLOL TARTRATE 1 MG/ML
5 INJECTION, SOLUTION INTRAVENOUS ONCE
Status: COMPLETED | OUTPATIENT
Start: 2024-09-20 | End: 2024-09-20

## 2024-09-20 RX ORDER — ONDANSETRON 4 MG/1
4 TABLET, ORALLY DISINTEGRATING ORAL EVERY 8 HOURS PRN
Status: DISCONTINUED | OUTPATIENT
Start: 2024-09-20 | End: 2024-09-23 | Stop reason: HOSPADM

## 2024-09-20 RX ORDER — CALCIUM CHLORIDE 100 MG/ML
INJECTION INTRAVENOUS; INTRAVENTRICULAR
Status: DISCONTINUED | OUTPATIENT
Start: 2024-09-20 | End: 2024-09-20 | Stop reason: SDUPTHER

## 2024-09-20 RX ORDER — DEXTROSE MONOHYDRATE AND SODIUM CHLORIDE 5; .9 G/100ML; G/100ML
INJECTION, SOLUTION INTRAVENOUS CONTINUOUS
Status: DISCONTINUED | OUTPATIENT
Start: 2024-09-20 | End: 2024-09-20

## 2024-09-20 RX ORDER — SODIUM CHLORIDE 9 MG/ML
INJECTION, SOLUTION INTRAVENOUS
Status: DISCONTINUED | OUTPATIENT
Start: 2024-09-20 | End: 2024-09-20 | Stop reason: SDUPTHER

## 2024-09-20 RX ORDER — FUROSEMIDE 10 MG/ML
INJECTION INTRAMUSCULAR; INTRAVENOUS
Status: COMPLETED
Start: 2024-09-20 | End: 2024-09-20

## 2024-09-20 RX ORDER — INSULIN LISPRO 100 [IU]/ML
0-4 INJECTION, SOLUTION INTRAVENOUS; SUBCUTANEOUS NIGHTLY
Status: DISCONTINUED | OUTPATIENT
Start: 2024-09-20 | End: 2024-09-23

## 2024-09-20 RX ORDER — LANOLIN ALCOHOL/MO/W.PET/CERES
400 CREAM (GRAM) TOPICAL DAILY
Status: DISCONTINUED | OUTPATIENT
Start: 2024-09-20 | End: 2024-09-23 | Stop reason: HOSPADM

## 2024-09-20 RX ORDER — ONDANSETRON 2 MG/ML
4 INJECTION INTRAMUSCULAR; INTRAVENOUS EVERY 6 HOURS PRN
Status: DISCONTINUED | OUTPATIENT
Start: 2024-09-20 | End: 2024-09-23 | Stop reason: HOSPADM

## 2024-09-20 RX ORDER — ACETAMINOPHEN 650 MG/1
650 SUPPOSITORY RECTAL EVERY 6 HOURS PRN
Status: DISCONTINUED | OUTPATIENT
Start: 2024-09-20 | End: 2024-09-23 | Stop reason: HOSPADM

## 2024-09-20 RX ORDER — SODIUM CHLORIDE 0.9 % (FLUSH) 0.9 %
5-40 SYRINGE (ML) INJECTION EVERY 12 HOURS SCHEDULED
Status: DISCONTINUED | OUTPATIENT
Start: 2024-09-20 | End: 2024-09-23 | Stop reason: HOSPADM

## 2024-09-20 RX ORDER — FUROSEMIDE 10 MG/ML
40 INJECTION INTRAMUSCULAR; INTRAVENOUS ONCE
Status: COMPLETED | OUTPATIENT
Start: 2024-09-20 | End: 2024-09-20

## 2024-09-20 RX ADMIN — BUMETANIDE 0.5 MG/HR: 0.25 INJECTION INTRAMUSCULAR; INTRAVENOUS at 12:20

## 2024-09-20 RX ADMIN — SODIUM BICARBONATE: 84 INJECTION, SOLUTION INTRAVENOUS at 18:06

## 2024-09-20 RX ADMIN — FUROSEMIDE 40 MG: 10 INJECTION, SOLUTION INTRAMUSCULAR; INTRAVENOUS at 09:17

## 2024-09-20 RX ADMIN — INSULIN LISPRO 10 UNITS: 100 INJECTION, SOLUTION INTRAVENOUS; SUBCUTANEOUS at 08:14

## 2024-09-20 RX ADMIN — FUROSEMIDE 40 MG: 10 INJECTION INTRAMUSCULAR; INTRAVENOUS at 09:17

## 2024-09-20 RX ADMIN — DIPHENHYDRAMINE HYDROCHLORIDE 12.5 MG: 50 INJECTION, SOLUTION INTRAMUSCULAR; INTRAVENOUS at 10:14

## 2024-09-20 RX ADMIN — SODIUM BICARBONATE 50 MEQ: 84 INJECTION, SOLUTION INTRAVENOUS at 11:06

## 2024-09-20 RX ADMIN — SODIUM CHLORIDE, PRESERVATIVE FREE 10 ML: 5 INJECTION INTRAVENOUS at 01:49

## 2024-09-20 RX ADMIN — HYDROMORPHONE HYDROCHLORIDE 0.5 MG: 1 INJECTION, SOLUTION INTRAMUSCULAR; INTRAVENOUS; SUBCUTANEOUS at 01:48

## 2024-09-20 RX ADMIN — SODIUM CHLORIDE: 9 INJECTION, SOLUTION INTRAVENOUS at 10:02

## 2024-09-20 RX ADMIN — SODIUM ZIRCONIUM CYCLOSILICATE 10 G: 10 POWDER, FOR SUSPENSION ORAL at 22:53

## 2024-09-20 RX ADMIN — FLUDROCORTISONE ACETATE 0.1 MG: 0.1 TABLET ORAL at 15:20

## 2024-09-20 RX ADMIN — INSULIN GLARGINE 14 UNITS: 100 INJECTION, SOLUTION SUBCUTANEOUS at 20:14

## 2024-09-20 RX ADMIN — CALCIUM CHLORIDE 0.5 G: 100 INJECTION, SOLUTION INTRAVENOUS at 10:21

## 2024-09-20 RX ADMIN — Medication 50 MEQ: at 10:15

## 2024-09-20 RX ADMIN — INSULIN LISPRO 1 UNITS: 100 INJECTION, SOLUTION INTRAVENOUS; SUBCUTANEOUS at 16:57

## 2024-09-20 RX ADMIN — MAGNESIUM SULFATE HEPTAHYDRATE 2000 MG: 40 INJECTION, SOLUTION INTRAVENOUS at 09:05

## 2024-09-20 RX ADMIN — WATER 1000 MG: 1 INJECTION INTRAMUSCULAR; INTRAVENOUS; SUBCUTANEOUS at 18:21

## 2024-09-20 RX ADMIN — SODIUM CHLORIDE: 9 INJECTION, SOLUTION INTRAVENOUS at 01:54

## 2024-09-20 RX ADMIN — PROPOFOL 100 MCG/KG/MIN: 10 INJECTION, EMULSION INTRAVENOUS at 10:15

## 2024-09-20 RX ADMIN — HYDROCODONE BITARTRATE AND ACETAMINOPHEN 1 TABLET: 5; 325 TABLET ORAL at 04:29

## 2024-09-20 RX ADMIN — CALCIUM GLUCONATE 1000 MG: 20 INJECTION, SOLUTION INTRAVENOUS at 08:19

## 2024-09-20 RX ADMIN — SODIUM CHLORIDE, PRESERVATIVE FREE 10 ML: 5 INJECTION INTRAVENOUS at 20:13

## 2024-09-20 RX ADMIN — INSULIN HUMAN 10 UNITS: 100 INJECTION, SOLUTION PARENTERAL at 11:06

## 2024-09-20 RX ADMIN — INSULIN LISPRO 4 UNITS: 100 INJECTION, SOLUTION INTRAVENOUS; SUBCUTANEOUS at 20:17

## 2024-09-20 RX ADMIN — MAGNESIUM SULFATE HEPTAHYDRATE 2000 MG: 40 INJECTION, SOLUTION INTRAVENOUS at 18:20

## 2024-09-20 RX ADMIN — METOPROLOL TARTRATE 5 MG: 1 INJECTION, SOLUTION INTRAVENOUS at 09:18

## 2024-09-20 RX ADMIN — CALCIUM GLUCONATE 1000 MG: 20 INJECTION, SOLUTION INTRAVENOUS at 01:53

## 2024-09-20 RX ADMIN — SODIUM ZIRCONIUM CYCLOSILICATE 10 G: 10 POWDER, FOR SUSPENSION ORAL at 01:57

## 2024-09-20 RX ADMIN — ONDANSETRON 4 MG: 2 INJECTION INTRAMUSCULAR; INTRAVENOUS at 10:14

## 2024-09-20 RX ADMIN — CARVEDILOL 25 MG: 25 TABLET, FILM COATED ORAL at 16:54

## 2024-09-20 RX ADMIN — SODIUM BICARBONATE 75 ML/HR: 84 INJECTION, SOLUTION INTRAVENOUS at 10:13

## 2024-09-20 RX ADMIN — METOPROLOL TARTRATE 5 MG: 5 INJECTION, SOLUTION INTRAVENOUS at 09:18

## 2024-09-20 RX ADMIN — CALCIUM GLUCONATE 1000 MG: 20 INJECTION, SOLUTION INTRAVENOUS at 10:54

## 2024-09-20 ASSESSMENT — PAIN DESCRIPTION - LOCATION
LOCATION: FLANK
LOCATION: FLANK

## 2024-09-20 ASSESSMENT — PAIN SCALES - GENERAL
PAINLEVEL_OUTOF10: 8
PAINLEVEL_OUTOF10: 8
PAINLEVEL_OUTOF10: 9
PAINLEVEL_OUTOF10: 8
PAINLEVEL_OUTOF10: 3

## 2024-09-20 ASSESSMENT — ENCOUNTER SYMPTOMS: SHORTNESS OF BREATH: 1

## 2024-09-20 ASSESSMENT — PAIN DESCRIPTION - PAIN TYPE
TYPE: ACUTE PAIN
TYPE: ACUTE PAIN

## 2024-09-20 ASSESSMENT — PAIN DESCRIPTION - DESCRIPTORS
DESCRIPTORS: SHARP;STABBING
DESCRIPTORS: SHARP;THROBBING

## 2024-09-20 ASSESSMENT — PAIN - FUNCTIONAL ASSESSMENT
PAIN_FUNCTIONAL_ASSESSMENT: ACTIVITIES ARE NOT PREVENTED
PAIN_FUNCTIONAL_ASSESSMENT: ACTIVITIES ARE NOT PREVENTED

## 2024-09-20 ASSESSMENT — PAIN DESCRIPTION - ONSET
ONSET: ON-GOING
ONSET: ON-GOING

## 2024-09-20 ASSESSMENT — PAIN DESCRIPTION - FREQUENCY
FREQUENCY: INTERMITTENT
FREQUENCY: INTERMITTENT

## 2024-09-20 ASSESSMENT — PAIN DESCRIPTION - ORIENTATION
ORIENTATION: LEFT

## 2024-09-20 ASSESSMENT — LIFESTYLE VARIABLES: SMOKING_STATUS: 0

## 2024-09-21 LAB
ANION GAP SERPL CALCULATED.3IONS-SCNC: 10 MMOL/L (ref 7–16)
BASOPHILS # BLD: 0 K/UL (ref 0–0.2)
BASOPHILS NFR BLD: 0 % (ref 0–2)
BUN SERPL-MCNC: 29 MG/DL (ref 6–23)
CALCIUM SERPL-MCNC: 9 MG/DL (ref 8.6–10.2)
CHLORIDE SERPL-SCNC: 94 MMOL/L (ref 98–107)
CO2 SERPL-SCNC: 30 MMOL/L (ref 22–29)
CREAT SERPL-MCNC: 2.3 MG/DL (ref 0.5–1)
EOSINOPHIL # BLD: 0.17 K/UL (ref 0.05–0.5)
EOSINOPHILS RELATIVE PERCENT: 1 % (ref 0–6)
ERYTHROCYTE [DISTWIDTH] IN BLOOD BY AUTOMATED COUNT: 13.2 % (ref 11.5–15)
GFR, ESTIMATED: 24 ML/MIN/1.73M2
GLUCOSE BLD-MCNC: 229 MG/DL (ref 74–99)
GLUCOSE BLD-MCNC: 278 MG/DL (ref 74–99)
GLUCOSE BLD-MCNC: 280 MG/DL (ref 74–99)
GLUCOSE BLD-MCNC: 306 MG/DL (ref 74–99)
GLUCOSE SERPL-MCNC: 295 MG/DL (ref 74–99)
HCT VFR BLD AUTO: 27.9 % (ref 34–48)
HGB BLD-MCNC: 9.3 G/DL (ref 11.5–15.5)
LYMPHOCYTES NFR BLD: 2.73 K/UL (ref 1.5–4)
LYMPHOCYTES RELATIVE PERCENT: 14 % (ref 20–42)
MCH RBC QN AUTO: 29.6 PG (ref 26–35)
MCHC RBC AUTO-ENTMCNC: 33.3 G/DL (ref 32–34.5)
MCV RBC AUTO: 88.9 FL (ref 80–99.9)
METAMYELOCYTES ABSOLUTE COUNT: 0.34 K/UL (ref 0–0.12)
METAMYELOCYTES: 2 % (ref 0–1)
MICROORGANISM SPEC CULT: ABNORMAL
MICROORGANISM SPEC CULT: ABNORMAL
MICROORGANISM SPEC CULT: NO GROWTH
MONOCYTES NFR BLD: 0.34 K/UL (ref 0.1–0.95)
MONOCYTES NFR BLD: 2 % (ref 2–12)
NEUTROPHILS NFR BLD: 82 % (ref 43–80)
NEUTS SEG NFR BLD: 16.02 K/UL (ref 1.8–7.3)
PLATELET # BLD AUTO: 219 K/UL (ref 130–450)
PMV BLD AUTO: 9 FL (ref 7–12)
POTASSIUM SERPL-SCNC: 3.9 MMOL/L (ref 3.5–5)
RBC # BLD AUTO: 3.14 M/UL (ref 3.5–5.5)
RBC # BLD: ABNORMAL 10*6/UL
SERVICE CMNT-IMP: ABNORMAL
SERVICE CMNT-IMP: NORMAL
SODIUM SERPL-SCNC: 134 MMOL/L (ref 132–146)
SPECIMEN DESCRIPTION: ABNORMAL
SPECIMEN DESCRIPTION: NORMAL
WBC OTHER # BLD: 19.6 K/UL (ref 4.5–11.5)

## 2024-09-21 PROCEDURE — 6370000000 HC RX 637 (ALT 250 FOR IP): Performed by: INTERNAL MEDICINE

## 2024-09-21 PROCEDURE — 82962 GLUCOSE BLOOD TEST: CPT

## 2024-09-21 PROCEDURE — 85025 COMPLETE CBC W/AUTO DIFF WBC: CPT

## 2024-09-21 PROCEDURE — 2060000000 HC ICU INTERMEDIATE R&B

## 2024-09-21 PROCEDURE — 2580000003 HC RX 258: Performed by: INTERNAL MEDICINE

## 2024-09-21 PROCEDURE — 6370000000 HC RX 637 (ALT 250 FOR IP): Performed by: NURSE PRACTITIONER

## 2024-09-21 PROCEDURE — 36415 COLL VENOUS BLD VENIPUNCTURE: CPT

## 2024-09-21 PROCEDURE — 80048 BASIC METABOLIC PNL TOTAL CA: CPT

## 2024-09-21 PROCEDURE — 2580000003 HC RX 258: Performed by: NURSE PRACTITIONER

## 2024-09-21 PROCEDURE — 6360000002 HC RX W HCPCS: Performed by: NURSE PRACTITIONER

## 2024-09-21 PROCEDURE — 6360000002 HC RX W HCPCS

## 2024-09-21 RX ORDER — SODIUM CHLORIDE 9 MG/ML
INJECTION, SOLUTION INTRAVENOUS CONTINUOUS
Status: DISCONTINUED | OUTPATIENT
Start: 2024-09-21 | End: 2024-09-23 | Stop reason: HOSPADM

## 2024-09-21 RX ADMIN — INSULIN LISPRO 3 UNITS: 100 INJECTION, SOLUTION INTRAVENOUS; SUBCUTANEOUS at 10:37

## 2024-09-21 RX ADMIN — SODIUM CHLORIDE: 9 INJECTION, SOLUTION INTRAVENOUS at 18:17

## 2024-09-21 RX ADMIN — INSULIN GLARGINE 14 UNITS: 100 INJECTION, SOLUTION SUBCUTANEOUS at 21:36

## 2024-09-21 RX ADMIN — CARVEDILOL 25 MG: 25 TABLET, FILM COATED ORAL at 16:09

## 2024-09-21 RX ADMIN — HEPARIN SODIUM 5000 UNITS: 5000 INJECTION INTRAVENOUS; SUBCUTANEOUS at 21:36

## 2024-09-21 RX ADMIN — CINACALCET HYDROCHLORIDE 60 MG: 30 TABLET, FILM COATED ORAL at 07:56

## 2024-09-21 RX ADMIN — HYDROCODONE BITARTRATE AND ACETAMINOPHEN 1 TABLET: 5; 325 TABLET ORAL at 21:33

## 2024-09-21 RX ADMIN — HEPARIN SODIUM 5000 UNITS: 5000 INJECTION INTRAVENOUS; SUBCUTANEOUS at 14:03

## 2024-09-21 RX ADMIN — HEPARIN SODIUM 5000 UNITS: 5000 INJECTION INTRAVENOUS; SUBCUTANEOUS at 05:56

## 2024-09-21 RX ADMIN — SODIUM CHLORIDE, PRESERVATIVE FREE 10 ML: 5 INJECTION INTRAVENOUS at 21:39

## 2024-09-21 RX ADMIN — SODIUM ZIRCONIUM CYCLOSILICATE 10 G: 10 POWDER, FOR SUSPENSION ORAL at 07:57

## 2024-09-21 RX ADMIN — SODIUM ZIRCONIUM CYCLOSILICATE 10 G: 10 POWDER, FOR SUSPENSION ORAL at 14:03

## 2024-09-21 RX ADMIN — MAGNESIUM OXIDE 400 MG (241.3 MG MAGNESIUM) TABLET 400 MG: TABLET at 07:56

## 2024-09-21 RX ADMIN — INSULIN LISPRO 2 UNITS: 100 INJECTION, SOLUTION INTRAVENOUS; SUBCUTANEOUS at 05:58

## 2024-09-21 RX ADMIN — INSULIN LISPRO 1 UNITS: 100 INJECTION, SOLUTION INTRAVENOUS; SUBCUTANEOUS at 16:10

## 2024-09-21 RX ADMIN — SODIUM CHLORIDE: 9 INJECTION, SOLUTION INTRAVENOUS at 08:01

## 2024-09-21 RX ADMIN — CARVEDILOL 25 MG: 25 TABLET, FILM COATED ORAL at 07:57

## 2024-09-21 RX ADMIN — WATER 1000 MG: 1 INJECTION INTRAMUSCULAR; INTRAVENOUS; SUBCUTANEOUS at 18:16

## 2024-09-21 ASSESSMENT — PAIN SCALES - GENERAL
PAINLEVEL_OUTOF10: 0
PAINLEVEL_OUTOF10: 8

## 2024-09-22 LAB
ANION GAP SERPL CALCULATED.3IONS-SCNC: 10 MMOL/L (ref 7–16)
BASOPHILS # BLD: 0.06 K/UL (ref 0–0.2)
BASOPHILS NFR BLD: 0 % (ref 0–2)
BUN SERPL-MCNC: 25 MG/DL (ref 6–23)
CALCIUM SERPL-MCNC: 8.5 MG/DL (ref 8.6–10.2)
CHLORIDE SERPL-SCNC: 96 MMOL/L (ref 98–107)
CO2 SERPL-SCNC: 29 MMOL/L (ref 22–29)
CREAT SERPL-MCNC: 1.7 MG/DL (ref 0.5–1)
EOSINOPHIL # BLD: 0.26 K/UL (ref 0.05–0.5)
EOSINOPHILS RELATIVE PERCENT: 2 % (ref 0–6)
ERYTHROCYTE [DISTWIDTH] IN BLOOD BY AUTOMATED COUNT: 12.9 % (ref 11.5–15)
GFR, ESTIMATED: 34 ML/MIN/1.73M2
GLUCOSE BLD-MCNC: 199 MG/DL (ref 74–99)
GLUCOSE BLD-MCNC: 220 MG/DL (ref 74–99)
GLUCOSE BLD-MCNC: 233 MG/DL (ref 74–99)
GLUCOSE BLD-MCNC: 307 MG/DL (ref 74–99)
GLUCOSE SERPL-MCNC: 200 MG/DL (ref 74–99)
HCT VFR BLD AUTO: 27.2 % (ref 34–48)
HGB BLD-MCNC: 9 G/DL (ref 11.5–15.5)
IMM GRANULOCYTES # BLD AUTO: 0.15 K/UL (ref 0–0.58)
IMM GRANULOCYTES NFR BLD: 1 % (ref 0–5)
LYMPHOCYTES NFR BLD: 2.02 K/UL (ref 1.5–4)
LYMPHOCYTES RELATIVE PERCENT: 11 % (ref 20–42)
MCH RBC QN AUTO: 29.7 PG (ref 26–35)
MCHC RBC AUTO-ENTMCNC: 33.1 G/DL (ref 32–34.5)
MCV RBC AUTO: 89.8 FL (ref 80–99.9)
MICROORGANISM SPEC CULT: NORMAL
MONOCYTES NFR BLD: 0.93 K/UL (ref 0.1–0.95)
MONOCYTES NFR BLD: 5 % (ref 2–12)
NEUTROPHILS NFR BLD: 81 % (ref 43–80)
NEUTS SEG NFR BLD: 14.47 K/UL (ref 1.8–7.3)
PLATELET # BLD AUTO: 211 K/UL (ref 130–450)
PMV BLD AUTO: 9 FL (ref 7–12)
POTASSIUM SERPL-SCNC: 3.9 MMOL/L (ref 3.5–5)
RBC # BLD AUTO: 3.03 M/UL (ref 3.5–5.5)
RBC # BLD: ABNORMAL 10*6/UL
SERVICE CMNT-IMP: NORMAL
SODIUM SERPL-SCNC: 135 MMOL/L (ref 132–146)
SPECIMEN DESCRIPTION: NORMAL
WBC # BLD: ABNORMAL 10*3/UL
WBC OTHER # BLD: 17.9 K/UL (ref 4.5–11.5)

## 2024-09-22 PROCEDURE — 6360000002 HC RX W HCPCS: Performed by: NURSE PRACTITIONER

## 2024-09-22 PROCEDURE — 36415 COLL VENOUS BLD VENIPUNCTURE: CPT

## 2024-09-22 PROCEDURE — 6370000000 HC RX 637 (ALT 250 FOR IP): Performed by: INTERNAL MEDICINE

## 2024-09-22 PROCEDURE — 2580000003 HC RX 258: Performed by: INTERNAL MEDICINE

## 2024-09-22 PROCEDURE — 2580000003 HC RX 258: Performed by: NURSE PRACTITIONER

## 2024-09-22 PROCEDURE — 51798 US URINE CAPACITY MEASURE: CPT

## 2024-09-22 PROCEDURE — 6370000000 HC RX 637 (ALT 250 FOR IP): Performed by: NURSE PRACTITIONER

## 2024-09-22 PROCEDURE — 6360000002 HC RX W HCPCS

## 2024-09-22 PROCEDURE — 80048 BASIC METABOLIC PNL TOTAL CA: CPT

## 2024-09-22 PROCEDURE — 82962 GLUCOSE BLOOD TEST: CPT

## 2024-09-22 PROCEDURE — 2060000000 HC ICU INTERMEDIATE R&B

## 2024-09-22 PROCEDURE — 85025 COMPLETE CBC W/AUTO DIFF WBC: CPT

## 2024-09-22 RX ORDER — INSULIN LISPRO 100 [IU]/ML
0-4 INJECTION, SOLUTION INTRAVENOUS; SUBCUTANEOUS NIGHTLY
Status: DISCONTINUED | OUTPATIENT
Start: 2024-09-22 | End: 2024-09-23

## 2024-09-22 RX ORDER — INSULIN LISPRO 100 [IU]/ML
0-8 INJECTION, SOLUTION INTRAVENOUS; SUBCUTANEOUS
Status: DISCONTINUED | OUTPATIENT
Start: 2024-09-22 | End: 2024-09-23

## 2024-09-22 RX ORDER — INSULIN GLARGINE 100 [IU]/ML
18 INJECTION, SOLUTION SUBCUTANEOUS NIGHTLY
Status: DISCONTINUED | OUTPATIENT
Start: 2024-09-22 | End: 2024-09-23 | Stop reason: HOSPADM

## 2024-09-22 RX ADMIN — MAGNESIUM OXIDE 400 MG (241.3 MG MAGNESIUM) TABLET 400 MG: TABLET at 09:40

## 2024-09-22 RX ADMIN — CINACALCET HYDROCHLORIDE 60 MG: 30 TABLET, FILM COATED ORAL at 09:40

## 2024-09-22 RX ADMIN — HEPARIN SODIUM 5000 UNITS: 5000 INJECTION INTRAVENOUS; SUBCUTANEOUS at 15:40

## 2024-09-22 RX ADMIN — INSULIN GLARGINE 18 UNITS: 100 INJECTION, SOLUTION SUBCUTANEOUS at 21:06

## 2024-09-22 RX ADMIN — CARVEDILOL 25 MG: 25 TABLET, FILM COATED ORAL at 09:40

## 2024-09-22 RX ADMIN — HEPARIN SODIUM 5000 UNITS: 5000 INJECTION INTRAVENOUS; SUBCUTANEOUS at 06:38

## 2024-09-22 RX ADMIN — HEPARIN SODIUM 5000 UNITS: 5000 INJECTION INTRAVENOUS; SUBCUTANEOUS at 21:06

## 2024-09-22 RX ADMIN — SODIUM CHLORIDE: 9 INJECTION, SOLUTION INTRAVENOUS at 07:33

## 2024-09-22 RX ADMIN — WATER 1000 MG: 1 INJECTION INTRAMUSCULAR; INTRAVENOUS; SUBCUTANEOUS at 18:46

## 2024-09-22 RX ADMIN — SODIUM CHLORIDE: 9 INJECTION, SOLUTION INTRAVENOUS at 15:44

## 2024-09-22 RX ADMIN — INSULIN LISPRO 2 UNITS: 100 INJECTION, SOLUTION INTRAVENOUS; SUBCUTANEOUS at 17:01

## 2024-09-22 RX ADMIN — CARVEDILOL 25 MG: 25 TABLET, FILM COATED ORAL at 16:24

## 2024-09-22 RX ADMIN — BISACODYL 5 MG: 5 TABLET, COATED ORAL at 09:40

## 2024-09-22 RX ADMIN — INSULIN LISPRO 3 UNITS: 100 INJECTION, SOLUTION INTRAVENOUS; SUBCUTANEOUS at 12:09

## 2024-09-22 RX ADMIN — HYDROCODONE BITARTRATE AND ACETAMINOPHEN 1 TABLET: 5; 325 TABLET ORAL at 21:06

## 2024-09-22 ASSESSMENT — PAIN DESCRIPTION - FREQUENCY: FREQUENCY: INTERMITTENT

## 2024-09-22 ASSESSMENT — PAIN SCALES - GENERAL
PAINLEVEL_OUTOF10: 4
PAINLEVEL_OUTOF10: 10

## 2024-09-22 ASSESSMENT — PAIN DESCRIPTION - LOCATION: LOCATION: FLANK

## 2024-09-22 ASSESSMENT — PAIN DESCRIPTION - ORIENTATION: ORIENTATION: LEFT

## 2024-09-22 ASSESSMENT — PAIN DESCRIPTION - ONSET: ONSET: ON-GOING

## 2024-09-22 ASSESSMENT — PAIN DESCRIPTION - PAIN TYPE: TYPE: ACUTE PAIN

## 2024-09-22 ASSESSMENT — PAIN - FUNCTIONAL ASSESSMENT: PAIN_FUNCTIONAL_ASSESSMENT: PREVENTS OR INTERFERES SOME ACTIVE ACTIVITIES AND ADLS

## 2024-09-22 ASSESSMENT — PAIN DESCRIPTION - DESCRIPTORS: DESCRIPTORS: SHARP;THROBBING

## 2024-09-23 VITALS
OXYGEN SATURATION: 97 % | HEIGHT: 66 IN | RESPIRATION RATE: 18 BRPM | SYSTOLIC BLOOD PRESSURE: 170 MMHG | BODY MASS INDEX: 47.09 KG/M2 | HEART RATE: 83 BPM | WEIGHT: 293 LBS | TEMPERATURE: 97.7 F | DIASTOLIC BLOOD PRESSURE: 96 MMHG

## 2024-09-23 LAB
ALBUMIN SERPL-MCNC: 3.3 G/DL (ref 3.5–5.2)
ALP SERPL-CCNC: 61 U/L (ref 35–104)
ALT SERPL-CCNC: 10 U/L (ref 0–32)
ANION GAP SERPL CALCULATED.3IONS-SCNC: 11 MMOL/L (ref 7–16)
ANION GAP SERPL CALCULATED.3IONS-SCNC: 13 MMOL/L (ref 7–16)
AST SERPL-CCNC: 10 U/L (ref 0–31)
BASOPHILS # BLD: 0.07 K/UL (ref 0–0.2)
BASOPHILS NFR BLD: 1 % (ref 0–2)
BILIRUB SERPL-MCNC: 0.4 MG/DL (ref 0–1.2)
BUN SERPL-MCNC: 17 MG/DL (ref 6–23)
BUN SERPL-MCNC: 17 MG/DL (ref 6–23)
CALCIUM SERPL-MCNC: 8.5 MG/DL (ref 8.6–10.2)
CALCIUM SERPL-MCNC: 8.6 MG/DL (ref 8.6–10.2)
CHLORIDE SERPL-SCNC: 97 MMOL/L (ref 98–107)
CHLORIDE SERPL-SCNC: 99 MMOL/L (ref 98–107)
CO2 SERPL-SCNC: 24 MMOL/L (ref 22–29)
CO2 SERPL-SCNC: 25 MMOL/L (ref 22–29)
CREAT SERPL-MCNC: 1.3 MG/DL (ref 0.5–1)
CREAT SERPL-MCNC: 1.3 MG/DL (ref 0.5–1)
EOSINOPHIL # BLD: 0.22 K/UL (ref 0.05–0.5)
EOSINOPHILS RELATIVE PERCENT: 2 % (ref 0–6)
ERYTHROCYTE [DISTWIDTH] IN BLOOD BY AUTOMATED COUNT: 12.8 % (ref 11.5–15)
GFR, ESTIMATED: 48 ML/MIN/1.73M2
GFR, ESTIMATED: 48 ML/MIN/1.73M2
GLUCOSE BLD-MCNC: 197 MG/DL (ref 74–99)
GLUCOSE BLD-MCNC: 230 MG/DL (ref 74–99)
GLUCOSE SERPL-MCNC: 238 MG/DL (ref 74–99)
GLUCOSE SERPL-MCNC: 241 MG/DL (ref 74–99)
HCT VFR BLD AUTO: 29.3 % (ref 34–48)
HGB BLD-MCNC: 9.3 G/DL (ref 11.5–15.5)
IMM GRANULOCYTES # BLD AUTO: 0.16 K/UL (ref 0–0.58)
IMM GRANULOCYTES NFR BLD: 2 % (ref 0–5)
LYMPHOCYTES NFR BLD: 1.94 K/UL (ref 1.5–4)
LYMPHOCYTES RELATIVE PERCENT: 21 % (ref 20–42)
MCH RBC QN AUTO: 29.1 PG (ref 26–35)
MCHC RBC AUTO-ENTMCNC: 31.7 G/DL (ref 32–34.5)
MCV RBC AUTO: 91.6 FL (ref 80–99.9)
MONOCYTES NFR BLD: 0.94 K/UL (ref 0.1–0.95)
MONOCYTES NFR BLD: 10 % (ref 2–12)
NEUTROPHILS NFR BLD: 64 % (ref 43–80)
NEUTS SEG NFR BLD: 5.99 K/UL (ref 1.8–7.3)
PLATELET # BLD AUTO: 244 K/UL (ref 130–450)
PMV BLD AUTO: 9.6 FL (ref 7–12)
POTASSIUM SERPL-SCNC: 4 MMOL/L (ref 3.5–5)
POTASSIUM SERPL-SCNC: 4.1 MMOL/L (ref 3.5–5)
PROT SERPL-MCNC: 6.5 G/DL (ref 6.4–8.3)
RBC # BLD AUTO: 3.2 M/UL (ref 3.5–5.5)
SODIUM SERPL-SCNC: 133 MMOL/L (ref 132–146)
SODIUM SERPL-SCNC: 136 MMOL/L (ref 132–146)
WBC OTHER # BLD: 9.3 K/UL (ref 4.5–11.5)

## 2024-09-23 PROCEDURE — 2580000003 HC RX 258: Performed by: INTERNAL MEDICINE

## 2024-09-23 PROCEDURE — 82962 GLUCOSE BLOOD TEST: CPT

## 2024-09-23 PROCEDURE — 80048 BASIC METABOLIC PNL TOTAL CA: CPT

## 2024-09-23 PROCEDURE — 51798 US URINE CAPACITY MEASURE: CPT

## 2024-09-23 PROCEDURE — 6370000000 HC RX 637 (ALT 250 FOR IP): Performed by: INTERNAL MEDICINE

## 2024-09-23 PROCEDURE — 80053 COMPREHEN METABOLIC PANEL: CPT

## 2024-09-23 PROCEDURE — 6370000000 HC RX 637 (ALT 250 FOR IP): Performed by: NURSE PRACTITIONER

## 2024-09-23 PROCEDURE — 6360000002 HC RX W HCPCS

## 2024-09-23 PROCEDURE — 36415 COLL VENOUS BLD VENIPUNCTURE: CPT

## 2024-09-23 PROCEDURE — 85025 COMPLETE CBC W/AUTO DIFF WBC: CPT

## 2024-09-23 RX ORDER — SIMETHICONE 80 MG
160 TABLET,CHEWABLE ORAL 4 TIMES DAILY PRN
Qty: 180 TABLET | Refills: 3 | Status: SHIPPED | OUTPATIENT
Start: 2024-09-23

## 2024-09-23 RX ORDER — DOCUSATE SODIUM 100 MG/1
100 CAPSULE, LIQUID FILLED ORAL DAILY
Status: DISCONTINUED | OUTPATIENT
Start: 2024-09-23 | End: 2024-09-23 | Stop reason: HOSPADM

## 2024-09-23 RX ORDER — SENNOSIDES A AND B 8.6 MG/1
1 TABLET, FILM COATED ORAL NIGHTLY
Status: DISCONTINUED | OUTPATIENT
Start: 2024-09-23 | End: 2024-09-23 | Stop reason: HOSPADM

## 2024-09-23 RX ORDER — HYDROCODONE BITARTRATE AND ACETAMINOPHEN 5; 325 MG/1; MG/1
1 TABLET ORAL EVERY 6 HOURS PRN
Qty: 12 TABLET | Refills: 0 | Status: SHIPPED | OUTPATIENT
Start: 2024-09-23 | End: 2024-09-26

## 2024-09-23 RX ORDER — CARVEDILOL 25 MG/1
25 TABLET ORAL 2 TIMES DAILY WITH MEALS
Qty: 60 TABLET | Refills: 3 | Status: SHIPPED | OUTPATIENT
Start: 2024-09-23

## 2024-09-23 RX ORDER — AMLODIPINE BESYLATE 5 MG/1
5 TABLET ORAL DAILY
Qty: 30 TABLET | Refills: 3 | Status: SHIPPED | OUTPATIENT
Start: 2024-09-23

## 2024-09-23 RX ORDER — CEFDINIR 300 MG/1
300 CAPSULE ORAL 2 TIMES DAILY
Qty: 10 CAPSULE | Refills: 0 | Status: SHIPPED | OUTPATIENT
Start: 2024-09-23 | End: 2024-09-28

## 2024-09-23 RX ORDER — INSULIN LISPRO 100 [IU]/ML
0-16 INJECTION, SOLUTION INTRAVENOUS; SUBCUTANEOUS
Status: DISCONTINUED | OUTPATIENT
Start: 2024-09-23 | End: 2024-09-23 | Stop reason: HOSPADM

## 2024-09-23 RX ORDER — INSULIN LISPRO 100 [IU]/ML
0-4 INJECTION, SOLUTION INTRAVENOUS; SUBCUTANEOUS NIGHTLY
Status: DISCONTINUED | OUTPATIENT
Start: 2024-09-23 | End: 2024-09-23 | Stop reason: HOSPADM

## 2024-09-23 RX ORDER — SIMETHICONE 80 MG
160 TABLET,CHEWABLE ORAL 4 TIMES DAILY PRN
Status: DISCONTINUED | OUTPATIENT
Start: 2024-09-23 | End: 2024-09-23 | Stop reason: HOSPADM

## 2024-09-23 RX ORDER — AMLODIPINE BESYLATE 5 MG/1
5 TABLET ORAL DAILY
Status: DISCONTINUED | OUTPATIENT
Start: 2024-09-23 | End: 2024-09-23 | Stop reason: HOSPADM

## 2024-09-23 RX ADMIN — SODIUM CHLORIDE: 9 INJECTION, SOLUTION INTRAVENOUS at 10:12

## 2024-09-23 RX ADMIN — CARVEDILOL 25 MG: 25 TABLET, FILM COATED ORAL at 08:14

## 2024-09-23 RX ADMIN — MAGNESIUM OXIDE 400 MG (241.3 MG MAGNESIUM) TABLET 400 MG: TABLET at 08:14

## 2024-09-23 RX ADMIN — Medication 5000 UNITS: at 08:14

## 2024-09-23 RX ADMIN — HEPARIN SODIUM 5000 UNITS: 5000 INJECTION INTRAVENOUS; SUBCUTANEOUS at 06:44

## 2024-09-23 RX ADMIN — CINACALCET HYDROCHLORIDE 60 MG: 30 TABLET, FILM COATED ORAL at 08:14

## 2024-09-23 RX ADMIN — SODIUM CHLORIDE: 9 INJECTION, SOLUTION INTRAVENOUS at 00:23

## 2024-09-23 RX ADMIN — INSULIN LISPRO 2 UNITS: 100 INJECTION, SOLUTION INTRAVENOUS; SUBCUTANEOUS at 11:23

## 2024-09-23 RX ADMIN — DOCUSATE SODIUM 100 MG: 100 CAPSULE, LIQUID FILLED ORAL at 10:12

## 2024-09-23 RX ADMIN — HEPARIN SODIUM 5000 UNITS: 5000 INJECTION INTRAVENOUS; SUBCUTANEOUS at 13:36

## 2024-09-23 ASSESSMENT — PAIN SCALES - GENERAL
PAINLEVEL_OUTOF10: 1
PAINLEVEL_OUTOF10: 0
PAINLEVEL_OUTOF10: 0

## 2024-09-25 LAB
MICROORGANISM SPEC CULT: NORMAL
MICROORGANISM SPEC CULT: NORMAL
SERVICE CMNT-IMP: NORMAL
SERVICE CMNT-IMP: NORMAL
SPECIMEN DESCRIPTION: NORMAL
SPECIMEN DESCRIPTION: NORMAL

## 2024-10-15 NOTE — PROGRESS NOTES
[facility] PRE-ADMISSION TESTING INSTRUCTIONS    The Preadmission Testing patient is instructed accordingly using the following criteria (check applicable):    ARRIVAL INSTRUCTIONS:  [x] Parking the day of Surgery is located in the Main Entrance lot.  Upon entering the door, make an immediate right to the surgery reception desk    [x] Bring photo ID and insurance card    [x] Please be sure to arrange for a responsible adult to provide transportation to and from the hospital    [x] Please arrange for a responsible adult to be with you for the 24 hour period post procedure due to having anesthesia    [x] If you awake am of surgery not feeling well or have temperature >100 please call [phone]    GENERAL INSTRUCTIONS:    [x] No solid foods after midnight, no gum, candy or mints.  May have clear liquids until 4 hours prior to surgery.         [x] You may brush your teeth, do not swallow any toothpaste    [x] Take medications as instructed     [x] Stop herbal supplements and vitamins 5 days prior to procedure    [x] Take 1/2 dose of evening insulin, but no insulin after midnight    [x] No oral diabetic medications after midnight    [x] If diabetic and have low blood sugar or feel symptomatic, take 1-2oz apple juice only    [x] Shower or bath with soap, lather and rinse well, AM of Surgery, no lotion, powders or creams to surgical site    [x] No tobacco products within 24 hours of surgery     [x] No alcohol or illegal drug use, marijuana included, within 24 hours of surgery.    [x] Jewelry, body piercing's, eyeglasses, contact lenses and dentures are not permitted into surgery (bring cases)      [x] Please do not wear any nail polish, make up or hair products on the day of surgery    [x] You can expect a call the business day prior to procedure to notify you if your arrival time changes    [x] If you receive a survey after surgery we would greatly appreciate your comments    [x] Please

## 2024-10-17 ENCOUNTER — PREP FOR PROCEDURE (OUTPATIENT)
Dept: UROLOGY | Age: 64
End: 2024-10-17

## 2024-10-17 ENCOUNTER — ANESTHESIA EVENT (OUTPATIENT)
Dept: OPERATING ROOM | Age: 64
End: 2024-10-17

## 2024-10-17 RX ORDER — SODIUM CHLORIDE 9 MG/ML
INJECTION, SOLUTION INTRAVENOUS PRN
Status: CANCELLED | OUTPATIENT
Start: 2024-10-17

## 2024-10-17 RX ORDER — SODIUM CHLORIDE 0.9 % (FLUSH) 0.9 %
5-40 SYRINGE (ML) INJECTION EVERY 12 HOURS SCHEDULED
Status: CANCELLED | OUTPATIENT
Start: 2024-10-17

## 2024-10-17 RX ORDER — SODIUM CHLORIDE 0.9 % (FLUSH) 0.9 %
5-40 SYRINGE (ML) INJECTION PRN
Status: CANCELLED | OUTPATIENT
Start: 2024-10-17

## 2024-10-17 RX ORDER — SODIUM CHLORIDE 9 MG/ML
INJECTION, SOLUTION INTRAVENOUS CONTINUOUS
Status: CANCELLED | OUTPATIENT
Start: 2024-10-17

## 2024-10-18 ENCOUNTER — APPOINTMENT (OUTPATIENT)
Dept: GENERAL RADIOLOGY | Age: 64
End: 2024-10-18
Attending: UROLOGY

## 2024-10-18 ENCOUNTER — HOSPITAL ENCOUNTER (OUTPATIENT)
Age: 64
Setting detail: OUTPATIENT SURGERY
Discharge: HOME | End: 2024-10-18
Attending: UROLOGY | Admitting: UROLOGY

## 2024-10-18 ENCOUNTER — ANESTHESIA (OUTPATIENT)
Dept: OPERATING ROOM | Age: 64
End: 2024-10-18

## 2024-10-18 VITALS
TEMPERATURE: 96.7 F | HEART RATE: 74 BPM | RESPIRATION RATE: 18 BRPM | OXYGEN SATURATION: 98 % | SYSTOLIC BLOOD PRESSURE: 148 MMHG | BODY MASS INDEX: 47.09 KG/M2 | DIASTOLIC BLOOD PRESSURE: 78 MMHG | HEIGHT: 66 IN | WEIGHT: 293 LBS

## 2024-10-18 LAB — GLUCOSE BLD-MCNC: 117 MG/DL (ref 74–99)

## 2024-10-18 PROCEDURE — 2580000003 HC RX 258: Performed by: NURSE PRACTITIONER

## 2024-10-18 PROCEDURE — C1894 INTRO/SHEATH, NON-LASER: HCPCS | Performed by: UROLOGY

## 2024-10-18 PROCEDURE — C1769 GUIDE WIRE: HCPCS | Performed by: UROLOGY

## 2024-10-18 PROCEDURE — 74420 UROGRAPHY RTRGR +-KUB: CPT

## 2024-10-18 PROCEDURE — 2709999900 HC NON-CHARGEABLE SUPPLY: Performed by: UROLOGY

## 2024-10-18 PROCEDURE — 7100000011 HC PHASE II RECOVERY - ADDTL 15 MIN: Performed by: UROLOGY

## 2024-10-18 PROCEDURE — 6360000002 HC RX W HCPCS: Performed by: NURSE ANESTHETIST, CERTIFIED REGISTERED

## 2024-10-18 PROCEDURE — 3600000013 HC SURGERY LEVEL 3 ADDTL 15MIN: Performed by: UROLOGY

## 2024-10-18 PROCEDURE — 6360000002 HC RX W HCPCS: Performed by: NURSE PRACTITIONER

## 2024-10-18 PROCEDURE — 2720000010 HC SURG SUPPLY STERILE: Performed by: UROLOGY

## 2024-10-18 PROCEDURE — 2500000003 HC RX 250 WO HCPCS: Performed by: NURSE ANESTHETIST, CERTIFIED REGISTERED

## 2024-10-18 PROCEDURE — C1758 CATHETER, URETERAL: HCPCS | Performed by: UROLOGY

## 2024-10-18 PROCEDURE — 82962 GLUCOSE BLOOD TEST: CPT

## 2024-10-18 PROCEDURE — 3700000001 HC ADD 15 MINUTES (ANESTHESIA): Performed by: UROLOGY

## 2024-10-18 PROCEDURE — 3700000000 HC ANESTHESIA ATTENDED CARE: Performed by: UROLOGY

## 2024-10-18 PROCEDURE — C2617 STENT, NON-COR, TEM W/O DEL: HCPCS | Performed by: UROLOGY

## 2024-10-18 PROCEDURE — 3600000003 HC SURGERY LEVEL 3 BASE: Performed by: UROLOGY

## 2024-10-18 PROCEDURE — 6360000004 HC RX CONTRAST MEDICATION: Performed by: UROLOGY

## 2024-10-18 PROCEDURE — 7100000010 HC PHASE II RECOVERY - FIRST 15 MIN: Performed by: UROLOGY

## 2024-10-18 DEVICE — URETERAL STENT
Type: IMPLANTABLE DEVICE | Site: URETER | Status: FUNCTIONAL
Brand: PERCUFLEX™

## 2024-10-18 RX ORDER — IOPAMIDOL 612 MG/ML
INJECTION, SOLUTION INTRAVASCULAR PRN
Status: DISCONTINUED | OUTPATIENT
Start: 2024-10-18 | End: 2024-10-18 | Stop reason: ALTCHOICE

## 2024-10-18 RX ORDER — SODIUM CHLORIDE 9 MG/ML
INJECTION, SOLUTION INTRAVENOUS PRN
Status: DISCONTINUED | OUTPATIENT
Start: 2024-10-18 | End: 2024-10-18 | Stop reason: HOSPADM

## 2024-10-18 RX ORDER — SODIUM CHLORIDE 0.9 % (FLUSH) 0.9 %
5-40 SYRINGE (ML) INJECTION EVERY 12 HOURS SCHEDULED
Status: DISCONTINUED | OUTPATIENT
Start: 2024-10-18 | End: 2024-10-18 | Stop reason: HOSPADM

## 2024-10-18 RX ORDER — SODIUM CHLORIDE 0.9 % (FLUSH) 0.9 %
5-40 SYRINGE (ML) INJECTION PRN
Status: DISCONTINUED | OUTPATIENT
Start: 2024-10-18 | End: 2024-10-18 | Stop reason: HOSPADM

## 2024-10-18 RX ORDER — MIDAZOLAM HYDROCHLORIDE 1 MG/ML
INJECTION INTRAMUSCULAR; INTRAVENOUS
Status: DISCONTINUED | OUTPATIENT
Start: 2024-10-18 | End: 2024-10-18 | Stop reason: SDUPTHER

## 2024-10-18 RX ORDER — LIDOCAINE HYDROCHLORIDE 20 MG/ML
INJECTION, SOLUTION EPIDURAL; INFILTRATION; INTRACAUDAL; PERINEURAL
Status: DISCONTINUED | OUTPATIENT
Start: 2024-10-18 | End: 2024-10-18 | Stop reason: SDUPTHER

## 2024-10-18 RX ORDER — FENTANYL CITRATE 50 UG/ML
INJECTION, SOLUTION INTRAMUSCULAR; INTRAVENOUS
Status: DISCONTINUED | OUTPATIENT
Start: 2024-10-18 | End: 2024-10-18 | Stop reason: SDUPTHER

## 2024-10-18 RX ORDER — PROPOFOL 10 MG/ML
INJECTION, EMULSION INTRAVENOUS
Status: DISCONTINUED | OUTPATIENT
Start: 2024-10-18 | End: 2024-10-18 | Stop reason: SDUPTHER

## 2024-10-18 RX ORDER — SODIUM CHLORIDE 9 MG/ML
INJECTION, SOLUTION INTRAVENOUS CONTINUOUS
Status: DISCONTINUED | OUTPATIENT
Start: 2024-10-18 | End: 2024-10-18 | Stop reason: HOSPADM

## 2024-10-18 RX ADMIN — FENTANYL CITRATE 50 MCG: 50 INJECTION, SOLUTION INTRAMUSCULAR; INTRAVENOUS at 13:07

## 2024-10-18 RX ADMIN — WATER 2000 MG: 1 INJECTION INTRAMUSCULAR; INTRAVENOUS; SUBCUTANEOUS at 13:00

## 2024-10-18 RX ADMIN — MIDAZOLAM 1 MG: 1 INJECTION INTRAMUSCULAR; INTRAVENOUS at 12:59

## 2024-10-18 RX ADMIN — LIDOCAINE HYDROCHLORIDE 40 MG: 20 INJECTION, SOLUTION EPIDURAL; INFILTRATION; INTRACAUDAL; PERINEURAL at 13:07

## 2024-10-18 RX ADMIN — PROPOFOL 150 MCG/KG/MIN: 10 INJECTION, EMULSION INTRAVENOUS at 13:08

## 2024-10-18 RX ADMIN — PROPOFOL 50 MG: 10 INJECTION, EMULSION INTRAVENOUS at 13:07

## 2024-10-18 RX ADMIN — SODIUM CHLORIDE: 9 INJECTION, SOLUTION INTRAVENOUS at 12:40

## 2024-10-18 RX ADMIN — MIDAZOLAM 1 MG: 1 INJECTION INTRAMUSCULAR; INTRAVENOUS at 13:09

## 2024-10-18 ASSESSMENT — PAIN SCALES - GENERAL
PAINLEVEL_OUTOF10: 0

## 2024-10-18 ASSESSMENT — PAIN - FUNCTIONAL ASSESSMENT: PAIN_FUNCTIONAL_ASSESSMENT: 0-10

## 2024-10-18 ASSESSMENT — LIFESTYLE VARIABLES: SMOKING_STATUS: 0

## 2024-10-18 NOTE — OP NOTE
Operative Note      Patient: Ramona Norwood  YOB: 1960  MRN: [id number]    Date of Procedure: 10/18/2024    Pre-Op Diagnosis Codes:      * Uric acid nephrolithiasis [N20.0]     * Calculus of ureter [N20.1], left proximal ureteral stone    Post-Op Diagnosis: Same       Procedure: Cystoscopy, left ureteroscopy, stone extraction, stent exchange    Surgeon(s):  Raulito An MD    Assistant:   * No surgical staff found *    Anesthesia: Monitor Anesthesia Care    Estimated Blood Loss (mL): Minimal    Complications: None    Specimens:   * No specimens in log *    Implants:  Implant Name Type Inv. Item Serial No.  Lot No. LRB No. Used Action   STENT PERCUFLEX  4.8 X 24CM STYLET NOT INCLUDED - [id number]  STENT PERCUFLEX  4.8 X 24CM STYLET NOT INCLUDED  [facility]- [id number] Left 1 Implanted         Drains:   [REMOVED] Urinary Catheter 09/20/24 2 Way (Removed)       [REMOVED] Urinary Catheter 09/20/24 2 Way (Removed)   Catheter Indications Need for fluid volume management of the critically ill patient in a critical care setting 09/21/24 1945   Site Assessment No urethral drainage 09/21/24 1945   Urine Color Yellow 09/21/24 1945   Urine Appearance Clear 09/21/24 1945   Urine Odor Malodorous 09/21/24 1945   Collection Container Standard 09/21/24 1945   Securement Method Leg strap 09/21/24 1945   Catheter Care  Perineal wipes 09/21/24 0744   Catheter Best Practices  Drainage tube clipped to bed;Bag below bladder;Bag not on floor;Lack of dependent loop in tubing;Drainage bag less than half full 09/21/24 1945   Status Draining;Patent 09/21/24 1945   Output (mL) 750 mL 09/22/24 0602         INDICATIONS FOR PROCEDURE: Ramona Norwood a 64 y.o. with a history of nephrolithiasis. The patient has a left ureteral calculus and underwent previous stent insertion.  The patient presents for ureteroscopy, laser lithotripsy, understands the risks, benefits and alternatives of the procedure,  signed informed consent, and agreed to proceed.     DESCRIPTION OF PROCEDURE: The patient was brought into the operating room and placed under anesthesia in the dorsal lithotomy position. The patient was prepped and draped in a sterile fashion. A 21-Lao cystoscope with a 30-degree lens was passed into the bladder.  The entire urethra was examined and found to be within normal limits. 30- degree endoscopy was utilized to inspect the entire bladder mucosal surface. There was no evidence of tumors, calculi, diverticula, or other abnormalities throughout the entire bladder. The ureteral orifices were normal in size, number, and location.     The stent from the left ureteral orifice was grasped and delivered to the urethral meatus.  A wire was passed through the stent and into the renal pelvis under fluoroscopic guidance.  A semirigid ureteroscope was passed into the ureter.  Entire length of the ureter was examined there was a stone in the proximal ureter.  The stone was pushed into the kidney due to irrigation.  An additional wire was left indwelling.    A 12 x 28 ureteral access sheath was passed without difficulty into the proximal ureter.  Flexible ureteroscope was passed through the sheath and into the renal pelvis.  The stone was identified in the renal pelvis, was grasped with a [place] basket, and removed through the sheath.    A 4.8 x 24 double-J ureteral stent was passed over the wire, with good curl seen in the kidney as well as in the bladder. The bladder was drained. The patient was awakened from anesthesia and taken to recovery in stable condition.     PLAN: The patient is stone-free on the left side. An indwelling stent was left the patient will follow-up in the office for stent removal.      Electronically signed by Raulito An MD on 10/18/2024 at 1:41 PM

## 2024-10-18 NOTE — ANESTHESIA PRE PROCEDURE
Department of Anesthesiology  Preprocedure Note       Name:  Ramona Norwood   Age:  64 y.o.  :  1960                                          MRN:  [id number]         Date:  10/18/2024      Surgeon: Surgeon(s):  Raulito An MD    Procedure: Procedure(s):  CYSTOSCOPY RETROGRADE PYELOGRAM COMPLEX URETEROSCOPY J STENT  LASER LITHOTRIPSY LEFT    Medications prior to admission:   Prior to Admission medications    Medication Sig Start Date End Date Taking? Authorizing Provider   carvedilol (COREG) 25 MG tablet TAKE ONE TABLET BY MOUTH IN THE MORNING AND ONE TABLET IN THE EVENING 24  Yes Patrick Rabago, DO   metFORMIN (GLUCOPHAGE-XR) 750 MG extended release tablet TAKE ONE TABLET BY MOUTH TWO TIMES A DAY 24  Yes Patrick Rabago, DO   glimepiride (AMARYL) 2 MG tablet Take 1 tablet by mouth daily (with breakfast) 24  Yes Patrick Rabago, DO   cinacalcet (SENSIPAR) 60 MG tablet Take 1 tablet by mouth daily 24  Yes Patrick Rabago, DO   Cholecalciferol (VITAMIN D3) 125 MCG (5000 UT) CAPS Take by mouth Five times weekly M-F   Yes ProviderCurtis MD   magnesium oxide (MAG-OX) 400 MG tablet Take 1 tablet by mouth daily Taking 250 mg 3/22/23  Yes ProviderCurtis MD   insulin glargine (LANTUS SOLOSTAR) 100 UNIT/ML injection pen Inject 14 Units into the skin nightly 22  Yes Tosha Gomez, APRN - NP   carvedilol (COREG) 25 MG tablet Take 1 tablet by mouth 2 times daily (with meals) 24   Faith Peter APRN - CNP   amLODIPine (NORVASC) 5 MG tablet Take 1 tablet by mouth daily 24   Jean Marie Euceda MD   blood glucose monitor strips Test 2 times a day & as needed for symptoms of irregular blood glucose. Dispense sufficient amount for indicated testing frequency plus additional to accommodate PRN testing needs. 23   Patrick Rabago, DO   Semaglutide (RYBELSUS) 14 MG TABS Take 1 tablet daily  Patient taking differently: daily Take 1 tablet daily 4/10/23

## 2024-10-18 NOTE — H&P
Ramona Norwood is a 64 y.o. female with a left 4 mm mid ureteral stone and a history of sepsis of urinary origin.  A ureteral stent was placed approximately 3 weeks ago per me.  Patient denies fever, chills, nausea, vomiting.    Past Medical History:   Diagnosis Date    Arthritis     CHF (congestive heart failure) (HCC) 2018 9/20/2022 @ 0919: pt states echo at Dr Small's office / also Stress test in [facility] 2018 - - patient denies recent issues related to CHF    Chronic kidney disease     Diabetes mellitus (HCC)     Hyperparathyroidism (HCC)     Hypertension     Kidney stone        Past Surgical History:   Procedure Laterality Date    BLADDER SURGERY Right 08/22/2022    CYSTOSCOPY RETROGRADE PYELOGRAM STENT INSERTION performed by Raulito An MD at [facility] OR    BREAST BIOPSY Left 04/02/2013    benign    CYSTOSCOPY Left 9/20/2024    CYSTOSCOPY RETROGRADE PYELOGRAM  J STENT INSERTION LEFT performed by Raulito An MD at [facility] OR    HYSTERECTOMY (CERVIX STATUS UNKNOWN)      LITHOTRIPSY Right 9/21/2022    CYSTOSCOPY RETROGRADE PYELOGRAM URETEROSCOPY J STENT  RIGHT   (HOLD TIME 1400) performed by Raulito An MD at [facility] OR    OVARIAN CYST REMOVAL  12/05/2008    left    SUREKHA AND BSO (CERVIX REMOVED)      TONSILLECTOMY         Family History   Problem Relation Age of Onset    Lung Cancer Maternal Uncle     Cancer Maternal Aunt         Thymus       Prior to Admission medications    Medication Sig Start Date End Date Taking? Authorizing Provider   Cholecalciferol (VITAMIN D3) 125 MCG (5000 UT) CAPS Take by mouth Five times weekly M-F   Yes ProviderCurtis MD   magnesium oxide (MAG-OX) 400 MG tablet Take 1 tablet by mouth daily Taking 250 mg 3/22/23  Yes ProviderCurtis MD   carvedilol (COREG) 25 MG tablet TAKE ONE TABLET BY MOUTH IN THE MORNING AND ONE TABLET IN THE EVENING 9/23/24   Patrick Rabago DO   metFORMIN (GLUCOPHAGE-XR) 750 MG extended release tablet TAKE ONE TABLET BY  MOUTH TWO TIMES A DAY 9/23/24   Patrick Rabago, DO   carvedilol (COREG) 25 MG tablet Take 1 tablet by mouth 2 times daily (with meals) 9/23/24   Faith Peter APRN - CNP   amLODIPine (NORVASC) 5 MG tablet Take 1 tablet by mouth daily 9/23/24   Jean Marie Euceda MD   glimepiride (AMARYL) 2 MG tablet Take 1 tablet by mouth daily (with breakfast) 6/26/24   Patrick Rabago, DO   cinacalcet (SENSIPAR) 60 MG tablet Take 1 tablet by mouth daily 6/26/24   Patrick Rabago, DO   blood glucose monitor strips Test 2 times a day & as needed for symptoms of irregular blood glucose. Dispense sufficient amount for indicated testing frequency plus additional to accommodate PRN testing needs. 8/1/23   Patrick Rabago,    Semaglutide (RYBELSUS) 14 MG TABS Take 1 tablet daily  Patient taking differently: daily Take 1 tablet daily 4/10/23   Cas Leone MD   Lancets (ONETOUCH DELICA PLUS [id number]) MISC USE TO TEST TWICE DAILY 1/2/23   Patrick Rabago, DO   insulin glargine (LANTUS SOLOSTAR) 100 UNIT/ML injection pen Inject 14 Units into the skin nightly 9/27/22   Tosha Gomez APRN - NP   Insulin Pen Needle 32G X 4 MM MISC 1 each by Does not apply route daily 9/27/22   Tosha Gomez APRN - NP   Blood Glucose Monitoring Suppl (ONE TOUCH ULTRA 2) w/Device KIT use as directed 2/19/18   Provider, MD Curtis        Allergies: Trulicity [dulaglutide]    Social History     Tobacco Use    Smoking status: Never    Smokeless tobacco: Never   Substance Use Topics    Alcohol use: No        Review of Systems:  Respiratory: negative for cough and hemoptysis  Cardiovascular: negative for chest pain and dyspnea  Gastrointestinal: negative for abdominal pain, diarrhea, nausea and vomiting  Genitourinary: as per HPI, otherwise negative.  Derm: negative for rash and skin lesion(s)  Neurological: negative for seizures and tremors  Endocrine: negative for diabetic symptoms including polydipsia and polyuria  All

## 2024-10-18 NOTE — ANESTHESIA POSTPROCEDURE EVALUATION
Department of Anesthesiology  Postprocedure Note    Patient: Ramona Norwood  MRN: [id number]  YOB: 1960  Date of evaluation: 10/18/2024    Procedure Summary       Date: 10/18/24 Room / Location: [address]    Anesthesia Start: 1259 Anesthesia Stop:     Procedure: CYSTOSCOPY RETROGRADE PYELOGRAM COMPLEX URETEROSCOPY J STENT  LASER LITHOTRIPSY LEFT (Left: Ureter) Diagnosis:       Uric acid nephrolithiasis      Calculus of ureter      (Uric acid nephrolithiasis [N20.0])      (Calculus of ureter [N20.1])    Surgeons: Raulito An MD Responsible Provider: Lucero Tyler DO    Anesthesia Type: MAC ASA Status: 3            Anesthesia Type: No value filed.    Shankar Phase I: Shankar Score: 10    Shankar Phase II:      Anesthesia Post Evaluation    Patient location during evaluation: PACU  Patient participation: complete - patient participated  Level of consciousness: awake  Airway patency: patent  Nausea & Vomiting: no nausea and no vomiting  Cardiovascular status: hemodynamically stable  Respiratory status: acceptable  Hydration status: euvolemic  Pain management: adequate        No notable events documented.

## 2024-10-23 ENCOUNTER — HOSPITAL ENCOUNTER (OUTPATIENT)
Dept: GENERAL RADIOLOGY | Age: 64
Discharge: HOME | End: 2024-10-25
Payer: COMMERCIAL

## 2024-10-23 VITALS — BODY MASS INDEX: 48.82 KG/M2 | WEIGHT: 293 LBS | HEIGHT: 65 IN

## 2024-10-23 DIAGNOSIS — Z12.31 VISIT FOR SCREENING MAMMOGRAM: ICD-10-CM

## 2024-10-23 PROCEDURE — 77063 BREAST TOMOSYNTHESIS BI: CPT

## 2024-10-25 ENCOUNTER — CLINICAL DOCUMENTATION (OUTPATIENT)
Dept: GENERAL RADIOLOGY | Age: 64
End: 2024-10-25

## 2024-10-25 NOTE — PROGRESS NOTES
Patient viewed clinical report for mammogram on My Chart.  Report provided to Dr. Patrick Rabago per patient request on Authorization to Release the results of a mammogram form. Electronically signed by Izzy Allen RN, BSN on 10/25/2024 at 10:53 AM

## 2024-10-30 ENCOUNTER — HOSPITAL ENCOUNTER (OUTPATIENT)
Age: 64
Discharge: HOME | End: 2024-10-30
Payer: COMMERCIAL

## 2024-10-30 DIAGNOSIS — E11.22 TYPE 2 DIABETES MELLITUS WITH STAGE 2 CHRONIC KIDNEY DISEASE, WITHOUT LONG-TERM CURRENT USE OF INSULIN (HCC): ICD-10-CM

## 2024-10-30 DIAGNOSIS — N18.2 TYPE 2 DIABETES MELLITUS WITH STAGE 2 CHRONIC KIDNEY DISEASE, WITHOUT LONG-TERM CURRENT USE OF INSULIN (HCC): ICD-10-CM

## 2024-10-30 LAB
ANION GAP SERPL CALCULATED.3IONS-SCNC: 14 MMOL/L (ref 7–16)
BUN SERPL-MCNC: 23 MG/DL (ref 6–23)
CALCIUM SERPL-MCNC: 9.1 MG/DL (ref 8.6–10.2)
CHLORIDE SERPL-SCNC: 101 MMOL/L (ref 98–107)
CO2 SERPL-SCNC: 22 MMOL/L (ref 22–29)
CREAT SERPL-MCNC: 1.2 MG/DL (ref 0.5–1)
GFR, ESTIMATED: 51 ML/MIN/1.73M2
GLUCOSE SERPL-MCNC: 104 MG/DL (ref 74–99)
HBA1C MFR BLD: 6.7 % (ref 4–5.6)
POTASSIUM SERPL-SCNC: 4.7 MMOL/L (ref 3.5–5)
SODIUM SERPL-SCNC: 137 MMOL/L (ref 132–146)

## 2024-10-30 PROCEDURE — 83036 HEMOGLOBIN GLYCOSYLATED A1C: CPT

## 2024-10-30 PROCEDURE — 36415 COLL VENOUS BLD VENIPUNCTURE: CPT

## 2024-10-30 PROCEDURE — 80048 BASIC METABOLIC PNL TOTAL CA: CPT

## 2024-11-05 ENCOUNTER — HOSPITAL ENCOUNTER (OUTPATIENT)
Age: 64
Discharge: HOME | End: 2024-11-05

## 2024-11-05 DIAGNOSIS — D64.9 CHRONIC ANEMIA: ICD-10-CM

## 2024-11-05 LAB
ERYTHROCYTE [DISTWIDTH] IN BLOOD BY AUTOMATED COUNT: 12.6 % (ref 11.5–15)
HCT VFR BLD AUTO: 38.2 % (ref 34–48)
HGB BLD-MCNC: 12.3 G/DL (ref 11.5–15.5)
MCH RBC QN AUTO: 28.8 PG (ref 26–35)
MCHC RBC AUTO-ENTMCNC: 32.2 G/DL (ref 32–34.5)
MCV RBC AUTO: 89.5 FL (ref 80–99.9)
PLATELET # BLD AUTO: 458 K/UL (ref 130–450)
PMV BLD AUTO: 8.9 FL (ref 7–12)
RBC # BLD AUTO: 4.27 M/UL (ref 3.5–5.5)
WBC OTHER # BLD: 8.4 K/UL (ref 4.5–11.5)

## 2024-11-05 PROCEDURE — 85027 COMPLETE CBC AUTOMATED: CPT

## 2024-11-05 PROCEDURE — 36415 COLL VENOUS BLD VENIPUNCTURE: CPT

## 2024-11-06 PROBLEM — N18.9 ACUTE KIDNEY INJURY SUPERIMPOSED ON CKD (HCC): Status: RESOLVED | Noted: 2024-09-19 | Resolved: 2024-11-06

## 2024-11-06 PROBLEM — E87.20 ACIDOSIS: Status: RESOLVED | Noted: 2024-09-20 | Resolved: 2024-11-06

## 2024-11-06 PROBLEM — E87.5 HYPERKALEMIA: Status: RESOLVED | Noted: 2022-08-22 | Resolved: 2024-11-06

## 2024-11-06 PROBLEM — E83.42 HYPOMAGNESEMIA: Status: RESOLVED | Noted: 2024-09-20 | Resolved: 2024-11-06

## 2024-11-06 PROBLEM — R52 INTRACTABLE PAIN: Status: RESOLVED | Noted: 2024-09-19 | Resolved: 2024-11-06

## 2024-11-06 PROBLEM — N17.9 ACUTE KIDNEY INJURY SUPERIMPOSED ON CKD (HCC): Status: RESOLVED | Noted: 2024-09-19 | Resolved: 2024-11-06

## 2024-11-06 PROBLEM — N20.1 CALCULUS OF LEFT URETER: Status: RESOLVED | Noted: 2024-09-19 | Resolved: 2024-11-06

## 2025-02-26 NOTE — PLAN OF CARE
Problem: Chronic Conditions and Co-morbidities  Goal: Patient's chronic conditions and co-morbidity symptoms are monitored and maintained or improved  Outcome: Progressing     Problem: Discharge Planning  Goal: Discharge to home or other facility with appropriate resources  Outcome: Progressing    Problem: Pain  Goal: Verbalizes/displays adequate comfort level or baseline comfort level  Outcome: Progressing     Problem: Safety - Adult  Goal: Free from fall injury  Outcome: Progressing Stress test result is good. No major blockage to interfere with the blood flow to the heart muscle     Sincerely,  Mega Tompkins MD.   Interventional Cardiologist  [facility], [place]

## (undated) DEVICE — SOLUTION IV IRRIG POUR BRL 0.9% SODIUM CHL 2F7124

## (undated) DEVICE — DRAINBAG,ANTI-REFLUX TOWER,L/F,2000ML,LL: Brand: MEDLINE

## (undated) DEVICE — CATHETER URET 5FR L70CM OPN END SGL LUMN INJ HUB FLEXIMA

## (undated) DEVICE — MEDIA CONTRAST ISOVUE  300 10X50ML

## (undated) DEVICE — 4-PORT MANIFOLD: Brand: NEPTUNE 2

## (undated) DEVICE — CYSTO PACK: Brand: MEDLINE INDUSTRIES, INC.

## (undated) DEVICE — FLEXOR, URETERAL ACCESS SHEATH WITH AQ, HYDROPHILIC COATING: Brand: FLEXOR

## (undated) DEVICE — SOLUTION IRRIG 3000ML 0.9% SOD CHL USP UROMATIC PLAS CONT

## (undated) DEVICE — SOLUTION SURG PREP ANTIMICROBIAL 4 OZ SKIN WND EXIDINE

## (undated) DEVICE — GUIDEWIRE ENDOSCP L150CM DIA0.035IN TIP L15CM BENT PTFE

## (undated) DEVICE — 1.5 FR, 120 CM, NITI TIPLESS STONE BASKET: Brand: HALO

## (undated) DEVICE — GAUZE,SPONGE,4"X4",8PLY,STRL,LF,10/TRAY: Brand: MEDLINE

## (undated) DEVICE — SOLUTION IRRIG 3000ML STRL H2O USP UROMATIC PLAS CONT

## (undated) DEVICE — BAG DRNGE COMB PK

## (undated) DEVICE — CATHETER F BLLN 5CC 16FR 2 W HYDRGEL COAT LESS TRAUM LUB

## (undated) DEVICE — GOWN,SIRUS,FABRNF,XL,20/CS: Brand: MEDLINE

## (undated) DEVICE — SYRINGE MED 30ML STD CLR PLAS LUERLOCK TIP N CTRL DISP

## (undated) DEVICE — GUIDEWIRE ENDOSCP L150CM DIA0.035IN TIP 3CM PTFE NIT

## (undated) DEVICE — SPECIMEN CUP W/LID: Brand: DEROYAL

## (undated) DEVICE — INTEGRA® JARIT® SMITH-PETERSEN GOUGE, STRAIGHT: Brand: INTEGRA® JARIT®

## (undated) DEVICE — HOSE CONN FOR WST MGMT SYS NEPTUNE 2

## (undated) DEVICE — GLOVE ORANGE PI 7 1/2   MSG9075

## (undated) DEVICE — SOLUTION IV IRRIG WATER 1000ML POUR BRL 2F7114

## (undated) DEVICE — TUBING, SUCTION, 3/16" X 12', STRAIGHT: Brand: MEDLINE

## (undated) DEVICE — SOLUTION SCRB 4OZ 4% CHG H2O AIDED FOR PREOPERATIVE SKIN

## (undated) DEVICE — SOLUTION IRRIG 1000ML STRL H2O USP PLAS POUR BTL

## (undated) DEVICE — SYRINGE MED 10ML LUERLOCK TIP W/O SFTY DISP

## (undated) DEVICE — JELLY LIDOCAINE 2% UROJET PFS 25X5ML